# Patient Record
Sex: FEMALE | Race: WHITE | NOT HISPANIC OR LATINO | URBAN - METROPOLITAN AREA
[De-identification: names, ages, dates, MRNs, and addresses within clinical notes are randomized per-mention and may not be internally consistent; named-entity substitution may affect disease eponyms.]

---

## 2018-03-10 ENCOUNTER — EMERGENCY (EMERGENCY)
Facility: HOSPITAL | Age: 53
LOS: 0 days | Discharge: HOME | End: 2018-03-10
Attending: EMERGENCY MEDICINE

## 2018-03-10 VITALS
RESPIRATION RATE: 19 BRPM | DIASTOLIC BLOOD PRESSURE: 93 MMHG | WEIGHT: 110.01 LBS | HEIGHT: 64 IN | OXYGEN SATURATION: 96 % | HEART RATE: 70 BPM | TEMPERATURE: 99 F | SYSTOLIC BLOOD PRESSURE: 144 MMHG
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
## 2018-03-10 DIAGNOSIS — S05.02XA INJURY OF CONJUNCTIVA AND CORNEAL ABRASION WITHOUT FOREIGN BODY, LEFT EYE, INITIAL ENCOUNTER: ICD-10-CM

## 2018-03-10 DIAGNOSIS — F17.200 NICOTINE DEPENDENCE, UNSPECIFIED, UNCOMPLICATED: ICD-10-CM

## 2018-03-10 DIAGNOSIS — Z79.899 OTHER LONG TERM (CURRENT) DRUG THERAPY: ICD-10-CM
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
## 2018-03-10 DIAGNOSIS — H57.12 OCULAR PAIN, LEFT EYE: ICD-10-CM
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
## 2018-03-10 RX ORDER — POLYMYXIN B SULF/TRIMETHOPRIM 10000-1/ML
1 DROPS OPHTHALMIC (EYE) ONCE
Qty: 0 | Refills: 0 | Status: DISCONTINUED | OUTPATIENT
Start: 2018-03-10 | End: 2018-03-10

## 2018-03-10 RX ORDER — GENTAMICIN SULFATE 3 MG/ML
1 SOLUTION/ DROPS OPHTHALMIC ONCE
Qty: 0 | Refills: 0 | Status: COMPLETED | OUTPATIENT
Start: 2018-03-10 | End: 2018-03-10

## 2018-03-10 RX ORDER — TETANUS TOXOID, REDUCED DIPHTHERIA TOXOID AND ACELLULAR PERTUSSIS VACCINE, ADSORBED 5; 2.5; 8; 8; 2.5 [IU]/.5ML; [IU]/.5ML; UG/.5ML; UG/.5ML; UG/.5ML
0.5 SUSPENSION INTRAMUSCULAR ONCE
Qty: 0 | Refills: 0 | Status: COMPLETED | OUTPATIENT
Start: 2018-03-10 | End: 2018-03-10

## 2018-03-10 RX ADMIN — TETANUS TOXOID, REDUCED DIPHTHERIA TOXOID AND ACELLULAR PERTUSSIS VACCINE, ADSORBED 0.5 MILLILITER(S): 5; 2.5; 8; 8; 2.5 SUSPENSION INTRAMUSCULAR at 13:51

## 2018-03-10 RX ADMIN — GENTAMICIN SULFATE 1 DROP(S): 3 SOLUTION/ DROPS OPHTHALMIC at 13:55

## 2018-03-10 NOTE — ED PROVIDER NOTE - MEDICAL DECISION MAKING DETAILS
Chart finished.    51 yo woman with corneal abrasion.  Topical antibiotics and outpaitent follow up.

## 2018-03-10 NOTE — ED PROVIDER NOTE - OBJECTIVE STATEMENT
53 yo F with no significant PMHx presents to the ED c/o left eye redness and tearing. Pt states symptoms started this morning and have been persisting. Pt feels like she has a FB. She is unsure of tetanus status. She denies headache, dizziness, changes in vision, nausea, vomiting.

## 2018-04-10 ENCOUNTER — OUTPATIENT (OUTPATIENT)
Dept: OUTPATIENT SERVICES | Facility: HOSPITAL | Age: 53
LOS: 1 days | Discharge: HOME | End: 2018-04-10

## 2018-04-10 DIAGNOSIS — E11.9 TYPE 2 DIABETES MELLITUS WITHOUT COMPLICATIONS: ICD-10-CM

## 2018-04-10 DIAGNOSIS — E61.1 IRON DEFICIENCY: ICD-10-CM

## 2018-04-10 DIAGNOSIS — N39.0 URINARY TRACT INFECTION, SITE NOT SPECIFIED: ICD-10-CM

## 2018-04-10 DIAGNOSIS — D64.9 ANEMIA, UNSPECIFIED: ICD-10-CM

## 2018-04-10 DIAGNOSIS — D50.0 IRON DEFICIENCY ANEMIA SECONDARY TO BLOOD LOSS (CHRONIC): ICD-10-CM

## 2018-04-10 DIAGNOSIS — E78.00 PURE HYPERCHOLESTEROLEMIA, UNSPECIFIED: ICD-10-CM

## 2018-04-10 DIAGNOSIS — N18.2 CHRONIC KIDNEY DISEASE, STAGE 2 (MILD): ICD-10-CM

## 2018-04-10 DIAGNOSIS — E03.1 CONGENITAL HYPOTHYROIDISM WITHOUT GOITER: ICD-10-CM

## 2019-07-22 NOTE — ED ADULT TRIAGE NOTE - MODE OF ARRIVAL
Problem: Adult Inpatient Plan of Care  Goal: Plan of Care Review  Pt on RA. Pt with no apparent respiratory distress noted. Will continue to monitor.         Walk in

## 2021-07-16 ENCOUNTER — OUTPATIENT (OUTPATIENT)
Dept: OUTPATIENT SERVICES | Facility: HOSPITAL | Age: 56
LOS: 1 days | Discharge: HOME | End: 2021-07-16
Payer: MEDICARE

## 2021-07-16 VITALS
HEART RATE: 67 BPM | HEIGHT: 64 IN | TEMPERATURE: 98 F | RESPIRATION RATE: 15 BRPM | SYSTOLIC BLOOD PRESSURE: 119 MMHG | DIASTOLIC BLOOD PRESSURE: 73 MMHG | OXYGEN SATURATION: 97 % | WEIGHT: 103.84 LBS

## 2021-07-16 DIAGNOSIS — Z90.89 ACQUIRED ABSENCE OF OTHER ORGANS: Chronic | ICD-10-CM

## 2021-07-16 DIAGNOSIS — Z01.818 ENCOUNTER FOR OTHER PREPROCEDURAL EXAMINATION: ICD-10-CM

## 2021-07-16 DIAGNOSIS — Z98.890 OTHER SPECIFIED POSTPROCEDURAL STATES: Chronic | ICD-10-CM

## 2021-07-16 DIAGNOSIS — Z90.49 ACQUIRED ABSENCE OF OTHER SPECIFIED PARTS OF DIGESTIVE TRACT: Chronic | ICD-10-CM

## 2021-07-16 DIAGNOSIS — R91.1 SOLITARY PULMONARY NODULE: ICD-10-CM

## 2021-07-16 PROBLEM — M54.5 LOW BACK PAIN: Chronic | Status: ACTIVE | Noted: 2018-03-10

## 2021-07-16 LAB
ALBUMIN SERPL ELPH-MCNC: 4.1 G/DL — SIGNIFICANT CHANGE UP (ref 3.5–5.2)
ALP SERPL-CCNC: 109 U/L — SIGNIFICANT CHANGE UP (ref 30–115)
ALT FLD-CCNC: 13 U/L — SIGNIFICANT CHANGE UP (ref 0–41)
ANION GAP SERPL CALC-SCNC: 10 MMOL/L — SIGNIFICANT CHANGE UP (ref 7–14)
APTT BLD: 34.8 SEC — SIGNIFICANT CHANGE UP (ref 27–39.2)
AST SERPL-CCNC: 17 U/L — SIGNIFICANT CHANGE UP (ref 0–41)
BASOPHILS # BLD AUTO: 0.03 K/UL — SIGNIFICANT CHANGE UP (ref 0–0.2)
BASOPHILS NFR BLD AUTO: 0.4 % — SIGNIFICANT CHANGE UP (ref 0–1)
BILIRUB SERPL-MCNC: 0.4 MG/DL — SIGNIFICANT CHANGE UP (ref 0.2–1.2)
BUN SERPL-MCNC: 13 MG/DL — SIGNIFICANT CHANGE UP (ref 10–20)
CALCIUM SERPL-MCNC: 9.8 MG/DL — SIGNIFICANT CHANGE UP (ref 8.5–10.1)
CHLORIDE SERPL-SCNC: 100 MMOL/L — SIGNIFICANT CHANGE UP (ref 98–110)
CO2 SERPL-SCNC: 29 MMOL/L — SIGNIFICANT CHANGE UP (ref 17–32)
CREAT SERPL-MCNC: 0.5 MG/DL — LOW (ref 0.7–1.5)
EOSINOPHIL # BLD AUTO: 0.06 K/UL — SIGNIFICANT CHANGE UP (ref 0–0.7)
EOSINOPHIL NFR BLD AUTO: 0.7 % — SIGNIFICANT CHANGE UP (ref 0–8)
GLUCOSE SERPL-MCNC: 94 MG/DL — SIGNIFICANT CHANGE UP (ref 70–99)
HCT VFR BLD CALC: 37.7 % — SIGNIFICANT CHANGE UP (ref 37–47)
HGB BLD-MCNC: 12.5 G/DL — SIGNIFICANT CHANGE UP (ref 12–16)
IMM GRANULOCYTES NFR BLD AUTO: 0.2 % — SIGNIFICANT CHANGE UP (ref 0.1–0.3)
INR BLD: 1.02 RATIO — SIGNIFICANT CHANGE UP (ref 0.65–1.3)
LYMPHOCYTES # BLD AUTO: 1.78 K/UL — SIGNIFICANT CHANGE UP (ref 1.2–3.4)
LYMPHOCYTES # BLD AUTO: 21.1 % — SIGNIFICANT CHANGE UP (ref 20.5–51.1)
MCHC RBC-ENTMCNC: 28.9 PG — SIGNIFICANT CHANGE UP (ref 27–31)
MCHC RBC-ENTMCNC: 33.2 G/DL — SIGNIFICANT CHANGE UP (ref 32–37)
MCV RBC AUTO: 87.1 FL — SIGNIFICANT CHANGE UP (ref 81–99)
MONOCYTES # BLD AUTO: 0.52 K/UL — SIGNIFICANT CHANGE UP (ref 0.1–0.6)
MONOCYTES NFR BLD AUTO: 6.2 % — SIGNIFICANT CHANGE UP (ref 1.7–9.3)
NEUTROPHILS # BLD AUTO: 6.04 K/UL — SIGNIFICANT CHANGE UP (ref 1.4–6.5)
NEUTROPHILS NFR BLD AUTO: 71.4 % — SIGNIFICANT CHANGE UP (ref 42.2–75.2)
NRBC # BLD: 0 /100 WBCS — SIGNIFICANT CHANGE UP (ref 0–0)
PLATELET # BLD AUTO: 189 K/UL — SIGNIFICANT CHANGE UP (ref 130–400)
POTASSIUM SERPL-MCNC: 4 MMOL/L — SIGNIFICANT CHANGE UP (ref 3.5–5)
POTASSIUM SERPL-SCNC: 4 MMOL/L — SIGNIFICANT CHANGE UP (ref 3.5–5)
PROT SERPL-MCNC: 6.8 G/DL — SIGNIFICANT CHANGE UP (ref 6–8)
PROTHROM AB SERPL-ACNC: 11.7 SEC — SIGNIFICANT CHANGE UP (ref 9.95–12.87)
RBC # BLD: 4.33 M/UL — SIGNIFICANT CHANGE UP (ref 4.2–5.4)
RBC # FLD: 13.4 % — SIGNIFICANT CHANGE UP (ref 11.5–14.5)
SODIUM SERPL-SCNC: 139 MMOL/L — SIGNIFICANT CHANGE UP (ref 135–146)
WBC # BLD: 8.45 K/UL — SIGNIFICANT CHANGE UP (ref 4.8–10.8)
WBC # FLD AUTO: 8.45 K/UL — SIGNIFICANT CHANGE UP (ref 4.8–10.8)

## 2021-07-16 PROCEDURE — 93010 ELECTROCARDIOGRAM REPORT: CPT

## 2021-07-16 NOTE — H&P PST ADULT - NSICDXPASTSURGICALHX_GEN_ALL_CORE_FT
PAST SURGICAL HISTORY:  H/O laparoscopy     History of appendectomy     History of tonsillectomy

## 2021-07-16 NOTE — H&P PST ADULT - REASON FOR ADMISSION
Patient is a 55 year old female presenting to PAST in preparation for lung biopsy _ on 7/26/21  under local anesthesia by Dr. PATTON   PT REPORTS--THEY FOUND A MASS IN MY LUNG. I WENT FOR AN MRI OF MY LEFT SHOULDER.  AT THAT TIME A MASS WAS FOUND IN MY LEFT LUNG.   I HAVE PAIN UNDER MY  LEFT BREAST.  THE IS A 7 OUT OF 8.  THE PAIN RADIATES IN MY LEFT UNDERARM AND SOMETIMES TO THE BACK. THE PAIN STARTS OUT MILD AND THEN IT INCREASES THROUGHOUT THE DAY. Patient is a 55 year old female presenting to PAST in preparation for lung biopsy _ on 7/26/21  under local anesthesia by Dr. PATTON   PT REPORTS--THEY FOUND A MASS IN MY LUNG. I WENT FOR AN MRI OF MY LEFT SHOULDER.  AT THAT TIME A MASS WAS FOUND IN MY LEFT LUNG.   I HAVE PAIN UNDER MY  LEFT BREAST.  THE PAIN  IS A 7 OUT OF 8.  THE PAIN RADIATES IN MY LEFT UNDERARM AND SOMETIMES TO THE BACK. THE PAIN STARTS OUT MILD AND THEN IT INCREASES THROUGHOUT THE DAY.

## 2021-07-16 NOTE — H&P PST ADULT - HISTORY OF PRESENT ILLNESS
Universal Safety Interventions
PT PRESENTS TO PAST WITH NO SOB, CP, PALPITATIONS, DYSURIA, UTI OR URI AT PRESENT.   PT ABLE TO WALK UP 2-3 FLIGHTS OF STEPS WITH NO SOB.  AS PER THE PT, THIS IS HIS/HER COMPLETE MEDICAL AND SURGICAL HX, INCLUDING MEDICATIONS PRESCRIBED AND OVER THE COUNTER  denies travel outside the [place] in the past 30 days  pt denies any covid s/s, or tested positive in the past  pt advised self quarantine till day of procedure  Anesthesia Alert  NO--Difficult Airway  NO--History of neck surgery or radiation  NO--Limited ROM of neck  NO--History of Malignant hyperthermia  NO--Personal or family history of Pseudocholinesterase deficiency  NO--Prior Anesthesia Complication  NO--Latex Allergy  NO--Loose teeth  NO--History of Rheumatoid Arthritis  NO--AYE  NO BLEEDING RISK  NO--Other_____

## 2021-07-19 PROBLEM — D64.9 ANEMIA, UNSPECIFIED: Chronic | Status: ACTIVE | Noted: 2021-07-16

## 2021-07-23 ENCOUNTER — OUTPATIENT (OUTPATIENT)
Dept: OUTPATIENT SERVICES | Facility: HOSPITAL | Age: 56
LOS: 1 days | Discharge: HOME | End: 2021-07-23

## 2021-07-23 DIAGNOSIS — Z90.49 ACQUIRED ABSENCE OF OTHER SPECIFIED PARTS OF DIGESTIVE TRACT: Chronic | ICD-10-CM

## 2021-07-23 DIAGNOSIS — Z11.59 ENCOUNTER FOR SCREENING FOR OTHER VIRAL DISEASES: ICD-10-CM

## 2021-07-23 DIAGNOSIS — Z90.89 ACQUIRED ABSENCE OF OTHER ORGANS: Chronic | ICD-10-CM

## 2021-07-23 DIAGNOSIS — Z98.890 OTHER SPECIFIED POSTPROCEDURAL STATES: Chronic | ICD-10-CM

## 2021-07-26 ENCOUNTER — OUTPATIENT (OUTPATIENT)
Dept: OUTPATIENT SERVICES | Facility: HOSPITAL | Age: 56
LOS: 1 days | Discharge: HOME | End: 2021-07-26
Payer: MEDICARE

## 2021-07-26 DIAGNOSIS — Z90.89 ACQUIRED ABSENCE OF OTHER ORGANS: Chronic | ICD-10-CM

## 2021-07-26 DIAGNOSIS — Z90.49 ACQUIRED ABSENCE OF OTHER SPECIFIED PARTS OF DIGESTIVE TRACT: Chronic | ICD-10-CM

## 2021-07-26 DIAGNOSIS — Z98.890 OTHER SPECIFIED POSTPROCEDURAL STATES: Chronic | ICD-10-CM

## 2021-07-26 PROCEDURE — 88334 PATH CONSLTJ SURG CYTO XM EA: CPT | Mod: 26

## 2021-07-26 PROCEDURE — 88333 PATH CONSLTJ SURG CYTO XM 1: CPT | Mod: 26

## 2021-07-26 PROCEDURE — 71045 X-RAY EXAM CHEST 1 VIEW: CPT | Mod: 26

## 2021-07-26 PROCEDURE — 88312 SPECIAL STAINS GROUP 1: CPT | Mod: 26

## 2021-07-26 PROCEDURE — 88305 TISSUE EXAM BY PATHOLOGIST: CPT | Mod: 26

## 2021-07-26 PROCEDURE — 99152 MOD SED SAME PHYS/QHP 5/>YRS: CPT

## 2021-07-26 PROCEDURE — 32408 CORE NDL BX LNG/MED PERQ: CPT

## 2021-07-26 NOTE — PROGRESS NOTE ADULT - SUBJECTIVE AND OBJECTIVE BOX
INTERVENTIONAL RADIOLOGY BRIEF-OPERATIVE NOTE    Procedure: CT guided left lung biopsy    Pre-Op Diagnosis: Left lung mass    Post-Op Diagnosis: same as pre    Attending: Christian Sosa    Anesthesia (type):  [ ] General Anesthesia  [ ] Sedation  [ ] Spinal Anesthesia  [x ] Local/Regional    Contrast: none    Estimated Blood Loss: none    Condition:   [ ] Critical  [ ] Serious  [ ] Fair   [x ] Good    Findings/Follow up Plan of Care: several 20G core biopsy specimens were obtained.     Specimens Removed: 20G cores    Implants: none    Complications: no immediate    Disposition: recovery x 2 hours, home if chest xray is negative for pneumothorax.       Please call Interventional Radiology [phone] with any questions, concerns, or issues.        
                                      Interventional Radiology Outpatient Documentation    PREOPERATIVE DAY OF PROCEDURE EVALUATION:     I have personally seen and examined this patient. I agree with the history and physical which I have reviewed.   Meds:  Home Medications:  Chantix 1 mg oral tablet: 1 tab(s) orally 2 times a day (16 Jul 2021 11:00)  morphine 15 mg oral tablet: 1 tab(s) orally every 4 hours, As Needed (16 Jul 2021 11:00)  Opana 10 mg oral tablet: 20 milligram(s) orally 2 times a day (16 Jul 2021 10:48)  oxyMORphone 10 mg oral tablet: 1 tab(s) orally every 4 hours, As Needed (16 Jul 2021 11:00)     All: No Known Allergies    Labs:   Plan is for left lung mass biopsy with CT guidance.    Procedure/ risks/ benefits/ goals/ alternatives were explained. All questions answered. Informed content obtained from patient. Consent placed in chart.

## 2021-07-30 DIAGNOSIS — R91.8 OTHER NONSPECIFIC ABNORMAL FINDING OF LUNG FIELD: ICD-10-CM

## 2021-07-30 LAB — NON-GYNECOLOGICAL CYTOLOGY STUDY: SIGNIFICANT CHANGE UP

## 2022-07-13 NOTE — H&P PST ADULT - NSANTHOSAYNRD_GEN_A_CORE
none
No. AYE screening performed.  STOP BANG Legend: 0-2 = LOW Risk; 3-4 = INTERMEDIATE Risk; 5-8 = HIGH Risk

## 2023-09-01 ENCOUNTER — INPATIENT (INPATIENT)
Facility: HOSPITAL | Age: 58
LOS: 2 days | Discharge: HOME | DRG: 189 | End: 2023-09-04
Attending: STUDENT IN AN ORGANIZED HEALTH CARE EDUCATION/TRAINING PROGRAM | Admitting: INTERNAL MEDICINE
Payer: MEDICARE

## 2023-09-01 VITALS
TEMPERATURE: 97 F | SYSTOLIC BLOOD PRESSURE: 165 MMHG | DIASTOLIC BLOOD PRESSURE: 72 MMHG | WEIGHT: 89.95 LBS | OXYGEN SATURATION: 100 % | RESPIRATION RATE: 20 BRPM | HEART RATE: 88 BPM

## 2023-09-01 DIAGNOSIS — Z90.49 ACQUIRED ABSENCE OF OTHER SPECIFIED PARTS OF DIGESTIVE TRACT: Chronic | ICD-10-CM

## 2023-09-01 DIAGNOSIS — Z90.89 ACQUIRED ABSENCE OF OTHER ORGANS: Chronic | ICD-10-CM

## 2023-09-01 DIAGNOSIS — Z98.890 OTHER SPECIFIED POSTPROCEDURAL STATES: Chronic | ICD-10-CM

## 2023-09-01 DIAGNOSIS — J96.00 ACUTE RESPIRATORY FAILURE, UNSPECIFIED WHETHER WITH HYPOXIA OR HYPERCAPNIA: ICD-10-CM

## 2023-09-01 LAB
ALBUMIN SERPL ELPH-MCNC: 3.8 G/DL — SIGNIFICANT CHANGE UP (ref 3.5–5.2)
ALP SERPL-CCNC: 172 U/L — HIGH (ref 30–115)
ALT FLD-CCNC: 25 U/L — SIGNIFICANT CHANGE UP (ref 0–41)
ANION GAP SERPL CALC-SCNC: 10 MMOL/L — SIGNIFICANT CHANGE UP (ref 7–14)
APTT BLD: 32.6 SEC — SIGNIFICANT CHANGE UP (ref 27–39.2)
AST SERPL-CCNC: 24 U/L — SIGNIFICANT CHANGE UP (ref 0–41)
BASE EXCESS BLDV CALC-SCNC: 10 MMOL/L — HIGH (ref -2–3)
BASE EXCESS BLDV CALC-SCNC: 9.3 MMOL/L — HIGH (ref -2–3)
BASOPHILS # BLD AUTO: 0 K/UL — SIGNIFICANT CHANGE UP (ref 0–0.2)
BASOPHILS NFR BLD AUTO: 0 % — SIGNIFICANT CHANGE UP (ref 0–1)
BILIRUB SERPL-MCNC: 0.5 MG/DL — SIGNIFICANT CHANGE UP (ref 0.2–1.2)
BUN SERPL-MCNC: 32 MG/DL — HIGH (ref 10–20)
CA-I SERPL-SCNC: 1.15 MMOL/L — SIGNIFICANT CHANGE UP (ref 1.15–1.33)
CA-I SERPL-SCNC: 1.21 MMOL/L — SIGNIFICANT CHANGE UP (ref 1.15–1.33)
CALCIUM SERPL-MCNC: 9.2 MG/DL — SIGNIFICANT CHANGE UP (ref 8.4–10.5)
CHLORIDE SERPL-SCNC: 92 MMOL/L — LOW (ref 98–110)
CO2 SERPL-SCNC: 34 MMOL/L — HIGH (ref 17–32)
CREAT SERPL-MCNC: 0.6 MG/DL — LOW (ref 0.7–1.5)
EGFR: 103 ML/MIN/1.73M2 — SIGNIFICANT CHANGE UP
EOSINOPHIL NFR BLD AUTO: 0 % — SIGNIFICANT CHANGE UP (ref 0–8)
FLUAV AG NPH QL: SIGNIFICANT CHANGE UP
FLUBV AG NPH QL: SIGNIFICANT CHANGE UP
GAS PNL BLDA: SIGNIFICANT CHANGE UP
GAS PNL BLDV: 125 MMOL/L — LOW (ref 136–145)
GAS PNL BLDV: 132 MMOL/L — LOW (ref 136–145)
GAS PNL BLDV: SIGNIFICANT CHANGE UP
GAS PNL BLDV: SIGNIFICANT CHANGE UP
GLUCOSE SERPL-MCNC: 179 MG/DL — HIGH (ref 70–99)
HCO3 BLDV-SCNC: 41 MMOL/L — HIGH (ref 22–29)
HCO3 BLDV-SCNC: 43 MMOL/L — HIGH (ref 22–29)
HCT VFR BLD CALC: 39.5 % — SIGNIFICANT CHANGE UP (ref 37–47)
HCT VFR BLDA CALC: 38 % — LOW (ref 39–51)
HCT VFR BLDA CALC: 41 % — SIGNIFICANT CHANGE UP (ref 39–51)
HGB BLD CALC-MCNC: 12.8 G/DL — SIGNIFICANT CHANGE UP (ref 12.6–17.4)
HGB BLD CALC-MCNC: 13.5 G/DL — SIGNIFICANT CHANGE UP (ref 12.6–17.4)
HGB BLD-MCNC: 12.5 G/DL — SIGNIFICANT CHANGE UP (ref 12–16)
INR BLD: 1.25 RATIO — SIGNIFICANT CHANGE UP (ref 0.65–1.3)
LACTATE BLDV-MCNC: 1.5 MMOL/L — SIGNIFICANT CHANGE UP (ref 0.5–2)
LACTATE BLDV-MCNC: 2.5 MMOL/L — HIGH (ref 0.5–2)
LACTATE SERPL-SCNC: 1.1 MMOL/L — SIGNIFICANT CHANGE UP (ref 0.7–2)
LACTATE SERPL-SCNC: 1.3 MMOL/L — SIGNIFICANT CHANGE UP (ref 0.7–2)
LG PLATELETS BLD QL AUTO: SLIGHT — SIGNIFICANT CHANGE UP
LIDOCAIN IGE QN: 8 U/L — SIGNIFICANT CHANGE UP (ref 7–60)
LYMPHOCYTES # BLD AUTO: 1.27 K/UL — SIGNIFICANT CHANGE UP (ref 1.2–3.4)
LYMPHOCYTES # BLD AUTO: 5 % — LOW (ref 20.5–51.1)
MACROCYTES BLD QL: SLIGHT — SIGNIFICANT CHANGE UP
MAGNESIUM SERPL-MCNC: 2.1 MG/DL — SIGNIFICANT CHANGE UP (ref 1.8–2.4)
MANUAL SMEAR VERIFICATION: SIGNIFICANT CHANGE UP
MCHC RBC-ENTMCNC: 31 PG — SIGNIFICANT CHANGE UP (ref 27–31)
MCHC RBC-ENTMCNC: 31.6 G/DL — LOW (ref 32–37)
MCV RBC AUTO: 98 FL — SIGNIFICANT CHANGE UP (ref 81–99)
METAMYELOCYTES # FLD: 1 % — HIGH (ref 0–0)
MONOCYTES # BLD AUTO: 2.53 K/UL — HIGH (ref 0.1–0.6)
MONOCYTES NFR BLD AUTO: 10 % — HIGH (ref 1.7–9.3)
NEUTROPHILS # BLD AUTO: 21.29 K/UL — HIGH (ref 1.4–6.5)
NEUTROPHILS NFR BLD AUTO: 75 % — SIGNIFICANT CHANGE UP (ref 42.2–75.2)
NEUTS BAND # BLD: 9 % — HIGH (ref 0–6)
NEUTS HYPERSEG # BLD: PRESENT — SIGNIFICANT CHANGE UP
NEUTS VAC BLD QL SMEAR: SLIGHT — SIGNIFICANT CHANGE UP
NRBC # BLD: 0 /100 — SIGNIFICANT CHANGE UP (ref 0–0)
NRBC # BLD: SIGNIFICANT CHANGE UP /100 WBCS (ref 0–0)
NT-PROBNP SERPL-SCNC: 3087 PG/ML — HIGH (ref 0–300)
PCO2 BLDV: 112 MMHG — HIGH (ref 39–42)
PCO2 BLDV: 97 MMHG — HIGH (ref 39–42)
PH BLDV: 7.19 — LOW (ref 7.32–7.43)
PH BLDV: 7.23 — LOW (ref 7.32–7.43)
PLAT MORPH BLD: ABNORMAL
PLATELET # BLD AUTO: 213 K/UL — SIGNIFICANT CHANGE UP (ref 130–400)
PLATELET CLUMP BLD QL SMEAR: ABNORMAL
PMV BLD: 10.1 FL — SIGNIFICANT CHANGE UP (ref 7.4–10.4)
PO2 BLDV: 38 MMHG — SIGNIFICANT CHANGE UP
PO2 BLDV: 41 MMHG — SIGNIFICANT CHANGE UP
POLYCHROMASIA BLD QL SMEAR: SLIGHT — SIGNIFICANT CHANGE UP
POTASSIUM BLDV-SCNC: 4.3 MMOL/L — SIGNIFICANT CHANGE UP (ref 3.5–5.1)
POTASSIUM BLDV-SCNC: 4.3 MMOL/L — SIGNIFICANT CHANGE UP (ref 3.5–5.1)
POTASSIUM SERPL-MCNC: 4.3 MMOL/L — SIGNIFICANT CHANGE UP (ref 3.5–5)
POTASSIUM SERPL-SCNC: 4.3 MMOL/L — SIGNIFICANT CHANGE UP (ref 3.5–5)
PROT SERPL-MCNC: 7.1 G/DL — SIGNIFICANT CHANGE UP (ref 6–8)
PROTHROM AB SERPL-ACNC: 14.3 SEC — HIGH (ref 9.95–12.87)
RBC # BLD: 4.03 M/UL — LOW (ref 4.2–5.4)
RBC # FLD: 15 % — HIGH (ref 11.5–14.5)
RBC BLD AUTO: ABNORMAL
RSV RNA NPH QL NAA+NON-PROBE: SIGNIFICANT CHANGE UP
SAO2 % BLDV: 55 % — SIGNIFICANT CHANGE UP
SAO2 % BLDV: 56.8 % — SIGNIFICANT CHANGE UP
SARS-COV-2 RNA SPEC QL NAA+PROBE: SIGNIFICANT CHANGE UP
SODIUM SERPL-SCNC: 136 MMOL/L — SIGNIFICANT CHANGE UP (ref 135–146)
TOXIC GRANULES BLD QL SMEAR: PRESENT — SIGNIFICANT CHANGE UP
TROPONIN T SERPL-MCNC: <0.01 NG/ML — SIGNIFICANT CHANGE UP
WBC # BLD: 25.34 K/UL — HIGH (ref 4.8–10.8)
WBC # FLD AUTO: 25.34 K/UL — HIGH (ref 4.8–10.8)

## 2023-09-01 PROCEDURE — 84100 ASSAY OF PHOSPHORUS: CPT

## 2023-09-01 PROCEDURE — 87040 BLOOD CULTURE FOR BACTERIA: CPT

## 2023-09-01 PROCEDURE — 0225U NFCT DS DNA&RNA 21 SARSCOV2: CPT

## 2023-09-01 PROCEDURE — 70450 CT HEAD/BRAIN W/O DYE: CPT | Mod: 26,MA

## 2023-09-01 PROCEDURE — 84132 ASSAY OF SERUM POTASSIUM: CPT

## 2023-09-01 PROCEDURE — 86803 HEPATITIS C AB TEST: CPT

## 2023-09-01 PROCEDURE — 87899 AGENT NOS ASSAY W/OPTIC: CPT

## 2023-09-01 PROCEDURE — 87640 STAPH A DNA AMP PROBE: CPT

## 2023-09-01 PROCEDURE — 36415 COLL VENOUS BLD VENIPUNCTURE: CPT

## 2023-09-01 PROCEDURE — 82803 BLOOD GASES ANY COMBINATION: CPT

## 2023-09-01 PROCEDURE — 81001 URINALYSIS AUTO W/SCOPE: CPT

## 2023-09-01 PROCEDURE — 83735 ASSAY OF MAGNESIUM: CPT

## 2023-09-01 PROCEDURE — 83605 ASSAY OF LACTIC ACID: CPT

## 2023-09-01 PROCEDURE — 93306 TTE W/DOPPLER COMPLETE: CPT

## 2023-09-01 PROCEDURE — 84145 PROCALCITONIN (PCT): CPT

## 2023-09-01 PROCEDURE — C9113: CPT

## 2023-09-01 PROCEDURE — 99285 EMERGENCY DEPT VISIT HI MDM: CPT

## 2023-09-01 PROCEDURE — 71045 X-RAY EXAM CHEST 1 VIEW: CPT

## 2023-09-01 PROCEDURE — 87449 NOS EACH ORGANISM AG IA: CPT

## 2023-09-01 PROCEDURE — 80053 COMPREHEN METABOLIC PANEL: CPT

## 2023-09-01 PROCEDURE — 82330 ASSAY OF CALCIUM: CPT

## 2023-09-01 PROCEDURE — 93005 ELECTROCARDIOGRAM TRACING: CPT

## 2023-09-01 PROCEDURE — 85018 HEMOGLOBIN: CPT

## 2023-09-01 PROCEDURE — 85014 HEMATOCRIT: CPT

## 2023-09-01 PROCEDURE — 94660 CPAP INITIATION&MGMT: CPT

## 2023-09-01 PROCEDURE — 71045 X-RAY EXAM CHEST 1 VIEW: CPT | Mod: 26

## 2023-09-01 PROCEDURE — 85025 COMPLETE CBC W/AUTO DIFF WBC: CPT

## 2023-09-01 PROCEDURE — 84295 ASSAY OF SERUM SODIUM: CPT

## 2023-09-01 PROCEDURE — 94640 AIRWAY INHALATION TREATMENT: CPT

## 2023-09-01 PROCEDURE — 94760 N-INVAS EAR/PLS OXIMETRY 1: CPT

## 2023-09-01 PROCEDURE — 87641 MR-STAPH DNA AMP PROBE: CPT

## 2023-09-01 PROCEDURE — 87070 CULTURE OTHR SPECIMN AEROBIC: CPT

## 2023-09-01 PROCEDURE — 99223 1ST HOSP IP/OBS HIGH 75: CPT

## 2023-09-01 PROCEDURE — 71275 CT ANGIOGRAPHY CHEST: CPT | Mod: 26,MA

## 2023-09-01 RX ORDER — AZITHROMYCIN 500 MG/1
500 TABLET, FILM COATED ORAL EVERY 24 HOURS
Refills: 0 | Status: DISCONTINUED | OUTPATIENT
Start: 2023-09-02 | End: 2023-09-02

## 2023-09-01 RX ORDER — ALBUTEROL 90 UG/1
2 AEROSOL, METERED ORAL EVERY 4 HOURS
Refills: 0 | Status: DISCONTINUED | OUTPATIENT
Start: 2023-09-01 | End: 2023-09-02

## 2023-09-01 RX ORDER — MORPHINE SULFATE 50 MG/1
15 CAPSULE, EXTENDED RELEASE ORAL DAILY
Refills: 0 | Status: DISCONTINUED | OUTPATIENT
Start: 2023-09-01 | End: 2023-09-04

## 2023-09-01 RX ORDER — IPRATROPIUM/ALBUTEROL SULFATE 18-103MCG
3 AEROSOL WITH ADAPTER (GRAM) INHALATION ONCE
Refills: 0 | Status: COMPLETED | OUTPATIENT
Start: 2023-09-01 | End: 2023-09-01

## 2023-09-01 RX ORDER — OXYCODONE HYDROCHLORIDE 5 MG/1
10 TABLET ORAL
Refills: 0 | Status: DISCONTINUED | OUTPATIENT
Start: 2023-09-01 | End: 2023-09-04

## 2023-09-01 RX ORDER — SODIUM CHLORIDE 9 MG/ML
1000 INJECTION, SOLUTION INTRAVENOUS
Refills: 0 | Status: DISCONTINUED | OUTPATIENT
Start: 2023-09-01 | End: 2023-09-02

## 2023-09-01 RX ORDER — OXYMORPHONE HYDROCHLORIDE 10 MG/1
20 TABLET, EXTENDED RELEASE ORAL
Qty: 0 | Refills: 0 | DISCHARGE

## 2023-09-01 RX ORDER — AZITHROMYCIN 500 MG/1
TABLET, FILM COATED ORAL
Refills: 0 | Status: DISCONTINUED | OUTPATIENT
Start: 2023-09-01 | End: 2023-09-02

## 2023-09-01 RX ORDER — GUAIFENESIN/DEXTROMETHORPHAN 600MG-30MG
10 TABLET, EXTENDED RELEASE 12 HR ORAL EVERY 4 HOURS
Refills: 0 | Status: DISCONTINUED | OUTPATIENT
Start: 2023-09-01 | End: 2023-09-04

## 2023-09-01 RX ORDER — CHLORHEXIDINE GLUCONATE 213 G/1000ML
1 SOLUTION TOPICAL
Refills: 0 | Status: DISCONTINUED | OUTPATIENT
Start: 2023-09-01 | End: 2023-09-01

## 2023-09-01 RX ORDER — AZITHROMYCIN 500 MG/1
500 TABLET, FILM COATED ORAL ONCE
Refills: 0 | Status: COMPLETED | OUTPATIENT
Start: 2023-09-01 | End: 2023-09-01

## 2023-09-01 RX ORDER — OXYMORPHONE HYDROCHLORIDE 10 MG/1
1 TABLET, EXTENDED RELEASE ORAL
Qty: 0 | Refills: 0 | DISCHARGE

## 2023-09-01 RX ORDER — IPRATROPIUM/ALBUTEROL SULFATE 18-103MCG
3 AEROSOL WITH ADAPTER (GRAM) INHALATION EVERY 6 HOURS
Refills: 0 | Status: DISCONTINUED | OUTPATIENT
Start: 2023-09-01 | End: 2023-09-02

## 2023-09-01 RX ORDER — ENOXAPARIN SODIUM 100 MG/ML
40 INJECTION SUBCUTANEOUS EVERY 24 HOURS
Refills: 0 | Status: DISCONTINUED | OUTPATIENT
Start: 2023-09-01 | End: 2023-09-02

## 2023-09-01 RX ORDER — HYDROMORPHONE HYDROCHLORIDE 2 MG/ML
0.25 INJECTION INTRAMUSCULAR; INTRAVENOUS; SUBCUTANEOUS
Refills: 0 | Status: DISCONTINUED | OUTPATIENT
Start: 2023-09-01 | End: 2023-09-03

## 2023-09-01 RX ORDER — CEFEPIME 1 G/1
2000 INJECTION, POWDER, FOR SOLUTION INTRAMUSCULAR; INTRAVENOUS ONCE
Refills: 0 | Status: COMPLETED | OUTPATIENT
Start: 2023-09-01 | End: 2023-09-01

## 2023-09-01 RX ORDER — MORPHINE SULFATE 50 MG/1
1 CAPSULE, EXTENDED RELEASE ORAL
Qty: 0 | Refills: 0 | DISCHARGE

## 2023-09-01 RX ORDER — CHLORHEXIDINE GLUCONATE 213 G/1000ML
1 SOLUTION TOPICAL DAILY
Refills: 0 | Status: DISCONTINUED | OUTPATIENT
Start: 2023-09-01 | End: 2023-09-04

## 2023-09-01 RX ORDER — CEFEPIME 1 G/1
INJECTION, POWDER, FOR SOLUTION INTRAMUSCULAR; INTRAVENOUS
Refills: 0 | Status: DISCONTINUED | OUTPATIENT
Start: 2023-09-01 | End: 2023-09-04

## 2023-09-01 RX ORDER — CEFEPIME 1 G/1
2000 INJECTION, POWDER, FOR SOLUTION INTRAMUSCULAR; INTRAVENOUS EVERY 12 HOURS
Refills: 0 | Status: DISCONTINUED | OUTPATIENT
Start: 2023-09-02 | End: 2023-09-04

## 2023-09-01 RX ADMIN — HYDROMORPHONE HYDROCHLORIDE 0.25 MILLIGRAM(S): 2 INJECTION INTRAMUSCULAR; INTRAVENOUS; SUBCUTANEOUS at 17:08

## 2023-09-01 RX ADMIN — CEFEPIME 100 MILLIGRAM(S): 1 INJECTION, POWDER, FOR SOLUTION INTRAMUSCULAR; INTRAVENOUS at 17:30

## 2023-09-01 RX ADMIN — Medication 3 MILLILITER(S): at 13:05

## 2023-09-01 RX ADMIN — OXYCODONE HYDROCHLORIDE 10 MILLIGRAM(S): 5 TABLET ORAL at 22:46

## 2023-09-01 RX ADMIN — AZITHROMYCIN 255 MILLIGRAM(S): 500 TABLET, FILM COATED ORAL at 18:15

## 2023-09-01 RX ADMIN — Medication 40 MILLIGRAM(S): at 21:07

## 2023-09-01 RX ADMIN — HYDROMORPHONE HYDROCHLORIDE 0.25 MILLIGRAM(S): 2 INJECTION INTRAMUSCULAR; INTRAVENOUS; SUBCUTANEOUS at 17:30

## 2023-09-01 RX ADMIN — OXYCODONE HYDROCHLORIDE 10 MILLIGRAM(S): 5 TABLET ORAL at 22:16

## 2023-09-01 RX ADMIN — Medication 40 MILLIGRAM(S): at 17:08

## 2023-09-01 RX ADMIN — SODIUM CHLORIDE 75 MILLILITER(S): 9 INJECTION, SOLUTION INTRAVENOUS at 17:00

## 2023-09-01 RX ADMIN — ENOXAPARIN SODIUM 40 MILLIGRAM(S): 100 INJECTION SUBCUTANEOUS at 17:10

## 2023-09-01 NOTE — ED PROVIDER NOTE - OBJECTIVE STATEMENT
59yo former smoking woman pmhx known lung CA w/ brain mets in active immuno therapy peme/pembro following at [facility] last treatment 8/23, migraines presents w/ family from home w/ acute SOB    Pt was in her usual state of health until approximately two days prior when pt started complaining of productive cough, increased secretions; Per family last chest imaging at [place] showed inflammation vs infection"; This AM daughter called for well check and she was disoriented and complaining of difficulty breathing. Alerted EMS who reported hypoxia on seen and transported to ED

## 2023-09-01 NOTE — ED PROVIDER NOTE - PHYSICAL EXAMINATION
CONSTITUTIONAL:  in moderate acute distress.   SKIN: warm, dry  HEAD: Normocephalic; atraumatic.  EYES: PERRL, EOMI, normal sclera and conjunctiva   ENT: No nasal discharge; airway clear.  NECK: Supple; non tender.  CARD:  Regular rate and rhythm.   RESP: +inc WOB decreased breath sounds at bases  ABD: soft ntnd  EXT: Normal ROM.    LYMPH: No acute cervical adenopathy.  NEURO: Alert, oriented, grossly unremarkable  PSYCH: Cooperative, appropriate.

## 2023-09-01 NOTE — H&P ADULT - ASSESSMENT
57yo woman pmhx lung CA w/ mets to the brain on immunotherapy being treated for acute hypercapnic respiratory failure    # acute hypercapnic respiratory failure  # COPD exacerbation  # Lung CA  # r/o ?CAPNA vs ?post obstructive PNA  - WBC 25 on admission; per labs at bedside was 6.8 8/17  - solu 40 tid  - verona  - chest PT  - bipap prn   - cefepime/azithro x 5 days  - f/u cultures  - duonebs q6 standing w/ albuterol rescue    # Lung CA w/ brain mets - stable  - f/u OP  - pain management  morphine ER 15 qd  oxy IR 10 q3 (on oxy IR 15 q4 at home)    DVT lovenox sq  Activity as tolerated  Code full  Diet regular  GI ppx protonix    Per family med rec is complete, does get zofran prn when getting treatment   57yo woman pmhx lung CA w/ mets to the brain on immunotherapy being treated for acute hypercapnic respiratory failure    # acute hypercapnic respiratory failure  # COPD exacerbation  # Lung CA  # r/o ?CAPNA vs ?post obstructive PNA  - WBC 25 on admission; per labs at bedside was 6.8 8/17  - solu 40 tid  - verona  - chest PT  - bipap prn   - cefepime/azithro x 5 days  - f/u cultures  - duonebs q6 standing w/ albuterol rescue    # Lung CA w/ brain mets - stable  - f/u OP  - pain management  morphine ER 15 qd  oxy IR 10 q3 (on oxy IR 15 q4 at home)    DVT lovenox sq  Activity as tolerated  Code full (HCP would like to avoid intubation if possible but pt remains full code)  Diet regular  GI ppx protonix    Per family med rec is complete, does get zofran prn when getting treatment

## 2023-09-01 NOTE — H&P ADULT - HISTORY OF PRESENT ILLNESS
59yo former smoking woman pmhx known lung CA w/ brain mets in active immuno therapy peme/pembro following at [facility] last treatment 8/23, migraines presents w/ family from home w/ acute SOB    Pt was in her usual state of health until approximately two days prior when pt started complaining of productive cough, increased secretions; Per family last chest imaging at [place] showed inflammation vs infection"; This AM daughter called for well check and she was disoriented and complaining of difficulty breathing. Alerted EMS who reported hypoxia on seen and transported to ED    Of note per family last WBC 8/17 6.8  Last immunotherapy 8/23    HCP Daughter Judit Dawson RN works in [employer] w/ bad cell reception can contact on teams    family endorses productive cough, sob, robles,   family denies fever, n/v/d, sick contacts    ED  /72 HR 88 RR 20 100% NRB T 96.8  WBC 25.34 Hg 12.5 Plt 213 BUN 32 Cr 0.6 BNP 3087 Trop negative   VBG pH7.19 pCO2 112 pO2 41 BiCarb 43    Pt given a duoneb and put on BiPap with improved VBG    VBG 7.23 pCO2 97 pO2 38 BiCarb 41    Pt admitted to ICU for further management

## 2023-09-01 NOTE — ED PROVIDER NOTE - CARE PLAN
1 Principal Discharge DX:	Acute respiratory failure with hypoxia and hypercapnia  Secondary Diagnosis:	CO2 narcosis

## 2023-09-01 NOTE — ED ADULT NURSE NOTE - OBJECTIVE STATEMENT
patient BIBA O2 35% in the field placed on NRB. Patient laboring with audible chest congestion. Patient tried in NC, placed back on NRB.

## 2023-09-01 NOTE — ED PROVIDER NOTE - ATTENDING CONTRIBUTION TO CARE
Patient is a 58-year-old female with history of stage IV lung cancer with brain mets under chemoradiation and immunotherapy at [facility] last chemo August 23 coming in for sudden onset shortness of breath that began today.  She spoke to her daughter in the morning and was well and was planning on going to [place] but when daughter called later she reported having shortness of breath and appeared to be disoriented to the daughter.  Denies any syncope.    Exam: Increased work of breathing originally now improved on 4 L nasal cannula, lungs clear, regular rate, no lower extremity edema, no calf tenderness, no JVD, soft nontender abdomen, cachectic female  Plan: Labs, CT PE, x-ray, EKG, oxygen therapy, blood gas

## 2023-09-01 NOTE — PHARMACOTHERAPY INTERVENTION NOTE - COMMENTS
Recommended to order a Legionella urinary antigen since patient has been empirically started on azithromycin for the treatment of pneumonia.    Ifeanyi Madera, PharmD, BCIDP  Clinical Pharmacy Specialist, Infectious Diseases  Tele-Antimicrobial Stewardship Program (Tele-ASP)  Tele-ASP Phone: [phone]

## 2023-09-01 NOTE — H&P ADULT - NSHPLABSRESULTS_GEN_ALL_CORE
MEDICATIONS:  STANDING MEDICATIONS  chlorhexidine 4% Liquid 1 Application(s) Topical <User Schedule>  enoxaparin Injectable 40 milliGRAM(s) SubCutaneous every 24 hours    PRN MEDICATIONS  HYDROmorphone  Injectable 0.25 milliGRAM(s) IV Push every 2 hours PRN      LABS:                        12.5   25.34 )-----------( 213      ( 01 Sep 2023 11:30 )             39.5     09-01    136  |  92<L>  |  32<H>  ----------------------------<  179<H>  4.3   |  34<H>  |  0.6<L>    Ca    9.2      01 Sep 2023 11:30  Mg     2.1     09-01    TPro  7.1  /  Alb  3.8  /  TBili  0.5  /  DBili  x   /  AST  24  /  ALT  25  /  AlkPhos  172<H>  09-01    PT/INR - ( 01 Sep 2023 11:30 )   PT: 14.30 sec;   INR: 1.25 ratio         PTT - ( 01 Sep 2023 11:30 )  PTT:32.6 sec  Urinalysis Basic - ( 01 Sep 2023 11:30 )    Color: x / Appearance: x / SG: x / pH: x  Gluc: 179 mg/dL / Ketone: x  / Bili: x / Urobili: x   Blood: x / Protein: x / Nitrite: x   Leuk Esterase: x / RBC: x / WBC x   Sq Epi: x / Non Sq Epi: x / Bacteria: x        Troponin T, Serum: <0.01 ng/mL (09-01-23 @ 11:30)      CARDIAC MARKERS ( 01 Sep 2023 11:30 )  x     / <0.01 ng/mL / x     / x     / x          RADIOLOGY:    < from: Xray Chest 1 View- PORTABLE-Urgent (09.01.23 @ 12:01) >      Impression:    Left apical masslike opacity. Please see dedicated report of CAT scan of   the chest from the same day.        --- End of Report ---    < end of copied text >    < from: CT Head No Cont (09.01.23 @ 12:38) >      IMPRESSION:    1.  Motion limited study.    2.  Equivocal edema/lesion within the left cerebellar hemisphere (versus   artifact). Recommend further evaluation with contrast-enhanced CT or MRI   with control of patient motion.    --- End of Report ---      < end of copied text >    < from: CT Angio Chest PE Protocol w/ IV Cont (09.01.23 @ 12:42) >

## 2023-09-01 NOTE — ED PROVIDER NOTE - DIFFERENTIAL DIAGNOSIS
PE, lung ca, PNA Differential Diagnosis abdomen soft, non-tender, and non-distended. Bowel sounds present.

## 2023-09-01 NOTE — H&P ADULT - NSHPSOCIALHISTORY_GEN_ALL_CORE
Strong family support   Smoking Hx  no sick contacts no travel  2 pet dogs, Frisian mcneill and Frisian husky mix

## 2023-09-01 NOTE — H&P ADULT - NSHPPHYSICALEXAM_GEN_ALL_CORE
CONSTITUTIONAL: somnolent, on BiPap,     EYES: PERRLA and symmetric, EOMI, No conjunctival or scleral injection, non-icteric    ENMT: Oral mucosa with moist membranes. No external nasal lesions; normal dentition; no gross hearing impairment noted.    NECK: Supple, symmetric and without tracheal deviation; thyroid gland not enlarged and without palpable masses    RESPIRATORY: no use of accessory muscles; rough breath sounds BL, no wheezes, rales or rhonchi, no dullness or hyperresonance to percussion, no tactile fremitus, no subcutaneous emphysema    CARDIOVASCULAR: RRRR, +S1S2, no murmur appreciated, no rubs, no gallops; no JVD; no peripheral edema    Vascular: no carotid bruits; carotid pulse palpable, radial pulse palpable, posterior tibialis pulse palpable    GASTROINTESTINAL: Soft, tender, non distended, no rebound, no guarding; No palpable masses; no hepatosplenomegaly; no hernia palpated;    MUSCULOSKELETAL: no significant limitation on ROM, moves all extremities in plane of bed    SKIN: No rashes or ulcers noted; no subcutaneous nodules or induration palpable    NEUROLOGIC: moves all extremities in plane of bed, no droop    PSYCHIATRIC: somnolent and sleeping on bipap

## 2023-09-01 NOTE — PATIENT PROFILE ADULT - FALL HARM RISK - HARM RISK INTERVENTIONS

## 2023-09-01 NOTE — PATIENT PROFILE ADULT - DEAF OR HARD OF HEARING?
Recorded as Task  Date: 02/01/2017 03:28 PM, Created By: Azalia Rizvi  Task Name: Follow Up  Assigned To: Jeannette Moody  Regarding Patient: Mae Esquivel, Status: Active  CommentAzalia Rizvi - 01 Feb 2017 3:28 PM    TASK CREATED  [employer] sent fax stating Botox is approved for initial 3 month injection but will need clinical update for renewal.  Please discuss schedule with Dr Anupama Lawrence and let Nikki Wiley, , know when pt can come in for her appt's (will need Botox appt and follow up to document her response so that it can be renewed again before another treatment will be approved again). Thanks. [facility] - 01 Feb 2017 4:36 PM    TASK EDITED  Task Vivian carpio please arrange Botox appointment when Dr. Anupama Lawrence will be available. Vivian Rose - 02 Feb 2017 2:13 PM    TASK REASSIGNED: Previously Assigned To Vivian Rose  When would you like me to arrange Botox? Sravani Schneider - 14 Feb 2017 4:41 AM    TASK REPLIED TO: Previously Assigned To [address]. Flaco Young - 20 Feb 2017 1:36 PM    TASK EDITED  Left message for patient's sponsor at [facility] [facility] to call back. Per Dr. Jerez Matters work in [email or web address].  Vivian Rose - 24 Feb 2017 9:55 AM    TASK EDITED  Spoke with assistant at [facility], they will get patient here for 9am cancellation on 2/27/17  Azalia Rizvi - 24 Feb 2017 11:48 AM    TASK EDITED  Thank you. Electronically signed by:Jeannette Frias.   Feb 24 2017 11:49AM CST no

## 2023-09-01 NOTE — ED ADULT NURSE NOTE - NSFALLRISKINTERV_ED_ALL_ED

## 2023-09-01 NOTE — H&P ADULT - ATTENDING COMMENTS
57yo woman with a medical history of lung CA w/ mets to the brain on immunotherapy being treated for acute hypercapnic respiratory failure    acute respiratory failure with hypercapnia     - NIPPV   - IV steroids   - IV antibiotics   - check procal   - monitor in ICU

## 2023-09-02 LAB
ALBUMIN SERPL ELPH-MCNC: 3.4 G/DL — LOW (ref 3.5–5.2)
ALP SERPL-CCNC: 161 U/L — HIGH (ref 30–115)
ALT FLD-CCNC: 26 U/L — SIGNIFICANT CHANGE UP (ref 0–41)
ANION GAP SERPL CALC-SCNC: 6 MMOL/L — LOW (ref 7–14)
APPEARANCE UR: ABNORMAL
AST SERPL-CCNC: 26 U/L — SIGNIFICANT CHANGE UP (ref 0–41)
BACTERIA # UR AUTO: ABNORMAL /HPF
BASOPHILS # BLD AUTO: 0.09 K/UL — SIGNIFICANT CHANGE UP (ref 0–0.2)
BASOPHILS NFR BLD AUTO: 0.5 % — SIGNIFICANT CHANGE UP (ref 0–1)
BILIRUB SERPL-MCNC: 0.4 MG/DL — SIGNIFICANT CHANGE UP (ref 0.2–1.2)
BILIRUB UR-MCNC: NEGATIVE — SIGNIFICANT CHANGE UP
BUN SERPL-MCNC: 22 MG/DL — HIGH (ref 10–20)
CALCIUM SERPL-MCNC: 8.7 MG/DL — SIGNIFICANT CHANGE UP (ref 8.4–10.5)
CHLORIDE SERPL-SCNC: 93 MMOL/L — LOW (ref 98–110)
CO2 SERPL-SCNC: 38 MMOL/L — HIGH (ref 17–32)
COLOR SPEC: YELLOW — SIGNIFICANT CHANGE UP
CREAT SERPL-MCNC: 0.5 MG/DL — LOW (ref 0.7–1.5)
DIFF PNL FLD: ABNORMAL
EGFR: 108 ML/MIN/1.73M2 — SIGNIFICANT CHANGE UP
EOSINOPHIL # BLD AUTO: 0 K/UL — SIGNIFICANT CHANGE UP (ref 0–0.7)
EOSINOPHIL NFR BLD AUTO: 0 % — SIGNIFICANT CHANGE UP (ref 0–8)
EPI CELLS # UR: PRESENT
GLUCOSE SERPL-MCNC: 152 MG/DL — HIGH (ref 70–99)
GLUCOSE UR QL: NEGATIVE MG/DL — SIGNIFICANT CHANGE UP
HCT VFR BLD CALC: 38.4 % — SIGNIFICANT CHANGE UP (ref 37–47)
HCV AB S/CO SERPL IA: 0.03 COI — SIGNIFICANT CHANGE UP
HCV AB SERPL-IMP: SIGNIFICANT CHANGE UP
HGB BLD-MCNC: 12.3 G/DL — SIGNIFICANT CHANGE UP (ref 12–16)
HYALINE CASTS # UR AUTO: 1 — SIGNIFICANT CHANGE UP
IMM GRANULOCYTES NFR BLD AUTO: 1.2 % — HIGH (ref 0.1–0.3)
KETONES UR-MCNC: ABNORMAL MG/DL
LACTATE SERPL-SCNC: 1.2 MMOL/L — SIGNIFICANT CHANGE UP (ref 0.7–2)
LEUKOCYTE ESTERASE UR-ACNC: NEGATIVE — SIGNIFICANT CHANGE UP
LYMPHOCYTES # BLD AUTO: 0.87 K/UL — LOW (ref 1.2–3.4)
LYMPHOCYTES # BLD AUTO: 4.4 % — LOW (ref 20.5–51.1)
MAGNESIUM SERPL-MCNC: 2.2 MG/DL — SIGNIFICANT CHANGE UP (ref 1.8–2.4)
MCHC RBC-ENTMCNC: 31.4 PG — HIGH (ref 27–31)
MCHC RBC-ENTMCNC: 32 G/DL — SIGNIFICANT CHANGE UP (ref 32–37)
MCV RBC AUTO: 98 FL — SIGNIFICANT CHANGE UP (ref 81–99)
MONOCYTES # BLD AUTO: 0.54 K/UL — SIGNIFICANT CHANGE UP (ref 0.1–0.6)
MONOCYTES NFR BLD AUTO: 2.7 % — SIGNIFICANT CHANGE UP (ref 1.7–9.3)
MRSA PCR RESULT.: NEGATIVE — SIGNIFICANT CHANGE UP
NEUTROPHILS # BLD AUTO: 18.18 K/UL — HIGH (ref 1.4–6.5)
NEUTROPHILS NFR BLD AUTO: 91.2 % — HIGH (ref 42.2–75.2)
NITRITE UR-MCNC: NEGATIVE — SIGNIFICANT CHANGE UP
NRBC # BLD: 0 /100 WBCS — SIGNIFICANT CHANGE UP (ref 0–0)
PH UR: 6 — SIGNIFICANT CHANGE UP (ref 5–8)
PHOSPHATE SERPL-MCNC: 3.1 MG/DL — SIGNIFICANT CHANGE UP (ref 2.1–4.9)
PLATELET # BLD AUTO: 176 K/UL — SIGNIFICANT CHANGE UP (ref 130–400)
PMV BLD: 10.8 FL — HIGH (ref 7.4–10.4)
POTASSIUM SERPL-MCNC: 5 MMOL/L — SIGNIFICANT CHANGE UP (ref 3.5–5)
POTASSIUM SERPL-SCNC: 5 MMOL/L — SIGNIFICANT CHANGE UP (ref 3.5–5)
PROCALCITONIN SERPL-MCNC: 0.23 NG/ML — HIGH (ref 0.02–0.1)
PROT SERPL-MCNC: 6.5 G/DL — SIGNIFICANT CHANGE UP (ref 6–8)
PROT UR-MCNC: 100 MG/DL
RBC # BLD: 3.92 M/UL — LOW (ref 4.2–5.4)
RBC # FLD: 15.1 % — HIGH (ref 11.5–14.5)
RBC CASTS # UR COMP ASSIST: 8 /HPF — HIGH (ref 0–4)
SODIUM SERPL-SCNC: 137 MMOL/L — SIGNIFICANT CHANGE UP (ref 135–146)
SP GR SPEC: >1.03 — HIGH (ref 1–1.03)
UROBILINOGEN FLD QL: 1 MG/DL — SIGNIFICANT CHANGE UP (ref 0.2–1)
WBC # BLD: 19.91 K/UL — HIGH (ref 4.8–10.8)
WBC # FLD AUTO: 19.91 K/UL — HIGH (ref 4.8–10.8)
WBC UR QL: 2 /HPF — SIGNIFICANT CHANGE UP (ref 0–5)

## 2023-09-02 PROCEDURE — 93010 ELECTROCARDIOGRAM REPORT: CPT

## 2023-09-02 PROCEDURE — 99233 SBSQ HOSP IP/OBS HIGH 50: CPT

## 2023-09-02 PROCEDURE — 71045 X-RAY EXAM CHEST 1 VIEW: CPT | Mod: 26

## 2023-09-02 PROCEDURE — 99232 SBSQ HOSP IP/OBS MODERATE 35: CPT

## 2023-09-02 RX ORDER — SODIUM CHLORIDE 9 MG/ML
1000 INJECTION, SOLUTION INTRAVENOUS
Refills: 0 | Status: DISCONTINUED | OUTPATIENT
Start: 2023-09-02 | End: 2023-09-03

## 2023-09-02 RX ORDER — IPRATROPIUM/ALBUTEROL SULFATE 18-103MCG
3 AEROSOL WITH ADAPTER (GRAM) INHALATION EVERY 6 HOURS
Refills: 0 | Status: DISCONTINUED | OUTPATIENT
Start: 2023-09-02 | End: 2023-09-04

## 2023-09-02 RX ORDER — PANTOPRAZOLE SODIUM 20 MG/1
40 TABLET, DELAYED RELEASE ORAL DAILY
Refills: 0 | Status: DISCONTINUED | OUTPATIENT
Start: 2023-09-02 | End: 2023-09-04

## 2023-09-02 RX ORDER — IPRATROPIUM/ALBUTEROL SULFATE 18-103MCG
3 AEROSOL WITH ADAPTER (GRAM) INHALATION EVERY 4 HOURS
Refills: 0 | Status: DISCONTINUED | OUTPATIENT
Start: 2023-09-02 | End: 2023-09-04

## 2023-09-02 RX ORDER — AZITHROMYCIN 500 MG/1
500 TABLET, FILM COATED ORAL DAILY
Refills: 0 | Status: DISCONTINUED | OUTPATIENT
Start: 2023-09-03 | End: 2023-09-04

## 2023-09-02 RX ADMIN — OXYCODONE HYDROCHLORIDE 10 MILLIGRAM(S): 5 TABLET ORAL at 18:58

## 2023-09-02 RX ADMIN — PANTOPRAZOLE SODIUM 40 MILLIGRAM(S): 20 TABLET, DELAYED RELEASE ORAL at 12:33

## 2023-09-02 RX ADMIN — Medication 3 MILLILITER(S): at 19:22

## 2023-09-02 RX ADMIN — OXYCODONE HYDROCHLORIDE 10 MILLIGRAM(S): 5 TABLET ORAL at 04:12

## 2023-09-02 RX ADMIN — OXYCODONE HYDROCHLORIDE 10 MILLIGRAM(S): 5 TABLET ORAL at 08:51

## 2023-09-02 RX ADMIN — OXYCODONE HYDROCHLORIDE 10 MILLIGRAM(S): 5 TABLET ORAL at 18:31

## 2023-09-02 RX ADMIN — OXYCODONE HYDROCHLORIDE 10 MILLIGRAM(S): 5 TABLET ORAL at 04:42

## 2023-09-02 RX ADMIN — CEFEPIME 100 MILLIGRAM(S): 1 INJECTION, POWDER, FOR SOLUTION INTRAMUSCULAR; INTRAVENOUS at 18:20

## 2023-09-02 RX ADMIN — OXYCODONE HYDROCHLORIDE 10 MILLIGRAM(S): 5 TABLET ORAL at 13:36

## 2023-09-02 RX ADMIN — AZITHROMYCIN 255 MILLIGRAM(S): 500 TABLET, FILM COATED ORAL at 16:33

## 2023-09-02 RX ADMIN — Medication 3 MILLILITER(S): at 16:00

## 2023-09-02 RX ADMIN — OXYCODONE HYDROCHLORIDE 10 MILLIGRAM(S): 5 TABLET ORAL at 22:00

## 2023-09-02 RX ADMIN — Medication 40 MILLIGRAM(S): at 21:21

## 2023-09-02 RX ADMIN — Medication 40 MILLIGRAM(S): at 14:16

## 2023-09-02 RX ADMIN — SODIUM CHLORIDE 50 MILLILITER(S): 9 INJECTION, SOLUTION INTRAVENOUS at 10:32

## 2023-09-02 RX ADMIN — MORPHINE SULFATE 15 MILLIGRAM(S): 50 CAPSULE, EXTENDED RELEASE ORAL at 11:36

## 2023-09-02 RX ADMIN — OXYCODONE HYDROCHLORIDE 10 MILLIGRAM(S): 5 TABLET ORAL at 19:15

## 2023-09-02 RX ADMIN — OXYCODONE HYDROCHLORIDE 10 MILLIGRAM(S): 5 TABLET ORAL at 12:33

## 2023-09-02 RX ADMIN — Medication 40 MILLIGRAM(S): at 05:19

## 2023-09-02 RX ADMIN — CEFEPIME 100 MILLIGRAM(S): 1 INJECTION, POWDER, FOR SOLUTION INTRAMUSCULAR; INTRAVENOUS at 05:18

## 2023-09-02 RX ADMIN — OXYCODONE HYDROCHLORIDE 10 MILLIGRAM(S): 5 TABLET ORAL at 22:15

## 2023-09-02 RX ADMIN — MORPHINE SULFATE 15 MILLIGRAM(S): 50 CAPSULE, EXTENDED RELEASE ORAL at 18:31

## 2023-09-02 RX ADMIN — Medication 3 MILLILITER(S): at 08:54

## 2023-09-02 NOTE — PROGRESS NOTE ADULT - ASSESSMENT
# acute hypercapnic respiratory failure 2/2 COPD exacerbation high risk due to Lung CA  # r/o ?CAPNA vs ?post obstructive PNA  - cefepime/azithro x 5 days  - steroids    # Lung CA w/ brain mets - stable  - f/u OP    BRBPR x 1 episode  - lovenox held  - pt high risk for DVT due to active malignancy, will likely restart heprain sq for DVT ppx in AM if no further bleeding  - GI eval

## 2023-09-02 NOTE — DIETITIAN INITIAL EVALUATION ADULT - ORAL INTAKE PTA/DIET HISTORY
as per pt poor appetite PTA for last several months with inability to obtain groceries. pt lives alone. RD provided pt with list of food pantries and coupons for ensure products. will request for case management consult to ensure safe discharge. pt states her UBW is 110# vs current weight of 87#    presently on a DASH/TLC diet <50% of meals consumed however pt has been given ensure in which she consumed 100%

## 2023-09-02 NOTE — DIETITIAN INITIAL EVALUATION ADULT - PERTINENT MEDS FT
MEDICATIONS  (STANDING):  albuterol/ipratropium for Nebulization 3 milliLiter(s) Nebulizer every 4 hours    cefepime   IVPB 2000 milliGRAM(s) IV Intermittent every 12 hours  chlorhexidine 2% Cloths 1 Application(s) Topical daily  dextrose 5% + sodium chloride 0.9%. 1000 milliLiter(s) (50 mL/Hr) IV Continuous <Continuous>  methylPREDNISolone sodium succinate Injectable 40 milliGRAM(s) IV Push every 8 hours  morphine ER Tablet 15 milliGRAM(s) Oral daily  pantoprazole  Injectable 40 milliGRAM(s) IV Push daily    MEDICATIONS  (PRN):  albuterol/ipratropium for Nebulization 3 milliLiter(s) Nebulizer every 6 hours PRN Shortness of Breath and/or Wheezing  guaifenesin/dextromethorphan Oral Liquid 10 milliLiter(s) Oral every 4 hours PRN Cough  HYDROmorphone  Injectable 0.25 milliGRAM(s) IV Push every 2 hours PRN Severe Pain (7 - 10)  oxyCODONE    IR 10 milliGRAM(s) Oral every 3 hours PRN Severe Pain (7 - 10)

## 2023-09-02 NOTE — PROGRESS NOTE ADULT - NUTRITIONAL ASSESSMENT
This patient has been assessed with a concern for Malnutrition and has been determined to have a diagnosis/diagnoses of Severe protein-calorie malnutrition and Underweight (BMI < 19).    This patient is being managed with:   Diet Regular-  DASH/TLC {Sodium & Cholesterol Restricted}  Entered: Sep  2 2023 10:27AM    The following pending diet order is being considered for treatment of Severe protein-calorie malnutrition and Underweight (BMI < 19):  Diet DASH/TLC-  Sodium & Cholesterol Restricted  Supplement Feeding Modality:  Oral  Ensure Plus High Protein Cans or Servings Per Day:  1       Frequency:  Three Times a day  Entered: Sep  2 2023  6:55PM

## 2023-09-02 NOTE — DIETITIAN INITIAL EVALUATION ADULT - ADD RECOMMEND
-consider appetite stimulant such as remeron or marrinol if not contraindicated   RD to monitor tolerance to po diet, labs/meds, NFPF and f/u as needed within 3 days  high risk

## 2023-09-02 NOTE — DIETITIAN INITIAL EVALUATION ADULT - PERTINENT LABORATORY DATA
09-02    137  |  93<L>  |  22<H>  ----------------------------<  152<H>  5.0   |  38<H>  |  0.5<L>    Ca    8.7      02 Sep 2023 05:27  Phos  3.1     09-02  Mg     2.2     09-02    TPro  6.5  /  Alb  3.4<L>  /  TBili  0.4  /  DBili  x   /  AST  26  /  ALT  26  /  AlkPhos  161<H>  09-02                          12.3   19.91 )-----------( 176      ( 02 Sep 2023 05:27 )             38.4

## 2023-09-02 NOTE — DIETITIAN INITIAL EVALUATION ADULT - OTHER INFO
pt is 58 year old female forme smoker with hx of lung CA with brain mets in active immuno therapy at [place], last treatment on 8/23, p/w acute SOB and productive cough. pt admitted with acute hypercapnic respiratory failure, COPD exacerbation, lung CA and possible PNA

## 2023-09-02 NOTE — DIETITIAN NUTRITION RISK NOTIFICATION - TREATMENT: THE FOLLOWING DIET HAS BEEN RECOMMENDED
Diet, DASH/TLC:   Sodium & Cholesterol Restricted  Supplement Feeding Modality:  Oral  Ensure Plus High Protein Cans or Servings Per Day:  1       Frequency:  Three Times a day (09-02-23 @ 18:56) [Pending Verification By Attending]  Diet, Regular:   DASH/TLC {Sodium & Cholesterol Restricted} (09-02-23 @ 10:27) [Active]

## 2023-09-02 NOTE — PROGRESS NOTE ADULT - ASSESSMENT
Impression:  Acute chronic COPD with exacerbation  Impending respiratory failure  pneumonia possible   lung cancer with METS to brain receiving chemo/immuno therapies    SUGGEST:    Check O2 sat on NC, record, continue O2 as necessary to maintain sats > 90%  IV solumedrol  bronchodilator treatments ATC and PRN  NIPPV continual for 24H then PRN as tolerated  bedrest  NPO  GI and DVT prophylaxis  pulmonary toilet  IV abx cefepime  procal  obtain full cultures sputum gm stain CS  check Respiratory viral panel, Strep and Legionella Ag.   Nasal MRSA  Pulmonary toilet  advance mobility as tolerated  Keep SaO2 >92% adjust O2 as needed  DVT/ Gastritis prophylaxis    Overall improving  SDU

## 2023-09-03 LAB
ALBUMIN SERPL ELPH-MCNC: 3.1 G/DL — LOW (ref 3.5–5.2)
ALP SERPL-CCNC: 166 U/L — HIGH (ref 30–115)
ALT FLD-CCNC: 59 U/L — HIGH (ref 0–41)
ANION GAP SERPL CALC-SCNC: 8 MMOL/L — SIGNIFICANT CHANGE UP (ref 7–14)
AST SERPL-CCNC: 66 U/L — HIGH (ref 0–41)
BASOPHILS # BLD AUTO: 0.09 K/UL — SIGNIFICANT CHANGE UP (ref 0–0.2)
BASOPHILS NFR BLD AUTO: 0.5 % — SIGNIFICANT CHANGE UP (ref 0–1)
BILIRUB SERPL-MCNC: 0.3 MG/DL — SIGNIFICANT CHANGE UP (ref 0.2–1.2)
BUN SERPL-MCNC: 18 MG/DL — SIGNIFICANT CHANGE UP (ref 10–20)
CALCIUM SERPL-MCNC: 9 MG/DL — SIGNIFICANT CHANGE UP (ref 8.4–10.5)
CHLORIDE SERPL-SCNC: 95 MMOL/L — LOW (ref 98–110)
CO2 SERPL-SCNC: 33 MMOL/L — HIGH (ref 17–32)
CREAT SERPL-MCNC: <0.5 MG/DL — LOW (ref 0.7–1.5)
EGFR: 114 ML/MIN/1.73M2 — SIGNIFICANT CHANGE UP
EOSINOPHIL # BLD AUTO: 0 K/UL — SIGNIFICANT CHANGE UP (ref 0–0.7)
EOSINOPHIL NFR BLD AUTO: 0 % — SIGNIFICANT CHANGE UP (ref 0–8)
GLUCOSE SERPL-MCNC: 121 MG/DL — HIGH (ref 70–99)
GRAM STN FLD: SIGNIFICANT CHANGE UP
HCT VFR BLD CALC: 33.4 % — LOW (ref 37–47)
HGB BLD-MCNC: 10.9 G/DL — LOW (ref 12–16)
IMM GRANULOCYTES NFR BLD AUTO: 2.6 % — HIGH (ref 0.1–0.3)
LEGIONELLA AG UR QL: NEGATIVE — SIGNIFICANT CHANGE UP
LEGIONELLA AG UR QL: NEGATIVE — SIGNIFICANT CHANGE UP
LYMPHOCYTES # BLD AUTO: 0.81 K/UL — LOW (ref 1.2–3.4)
LYMPHOCYTES # BLD AUTO: 4.1 % — LOW (ref 20.5–51.1)
MAGNESIUM SERPL-MCNC: 1.8 MG/DL — SIGNIFICANT CHANGE UP (ref 1.8–2.4)
MCHC RBC-ENTMCNC: 31.4 PG — HIGH (ref 27–31)
MCHC RBC-ENTMCNC: 32.6 G/DL — SIGNIFICANT CHANGE UP (ref 32–37)
MCV RBC AUTO: 96.3 FL — SIGNIFICANT CHANGE UP (ref 81–99)
MONOCYTES # BLD AUTO: 0.73 K/UL — HIGH (ref 0.1–0.6)
MONOCYTES NFR BLD AUTO: 3.7 % — SIGNIFICANT CHANGE UP (ref 1.7–9.3)
NEUTROPHILS # BLD AUTO: 17.84 K/UL — HIGH (ref 1.4–6.5)
NEUTROPHILS NFR BLD AUTO: 89.1 % — HIGH (ref 42.2–75.2)
NRBC # BLD: 0 /100 WBCS — SIGNIFICANT CHANGE UP (ref 0–0)
PHOSPHATE SERPL-MCNC: 0.8 MG/DL — LOW (ref 2.1–4.9)
PLATELET # BLD AUTO: 144 K/UL — SIGNIFICANT CHANGE UP (ref 130–400)
PMV BLD: 11.1 FL — HIGH (ref 7.4–10.4)
POTASSIUM SERPL-MCNC: 4.4 MMOL/L — SIGNIFICANT CHANGE UP (ref 3.5–5)
POTASSIUM SERPL-SCNC: 4.4 MMOL/L — SIGNIFICANT CHANGE UP (ref 3.5–5)
PROT SERPL-MCNC: 5.9 G/DL — LOW (ref 6–8)
RAPID RVP RESULT: SIGNIFICANT CHANGE UP
RBC # BLD: 3.47 M/UL — LOW (ref 4.2–5.4)
RBC # FLD: 15.3 % — HIGH (ref 11.5–14.5)
S PNEUM AG UR QL: NEGATIVE — SIGNIFICANT CHANGE UP
SARS-COV-2 RNA SPEC QL NAA+PROBE: SIGNIFICANT CHANGE UP
SODIUM SERPL-SCNC: 136 MMOL/L — SIGNIFICANT CHANGE UP (ref 135–146)
SPECIMEN SOURCE: SIGNIFICANT CHANGE UP
WBC # BLD: 19.99 K/UL — HIGH (ref 4.8–10.8)
WBC # FLD AUTO: 19.99 K/UL — HIGH (ref 4.8–10.8)

## 2023-09-03 PROCEDURE — 93306 TTE W/DOPPLER COMPLETE: CPT | Mod: 26

## 2023-09-03 PROCEDURE — 99222 1ST HOSP IP/OBS MODERATE 55: CPT

## 2023-09-03 PROCEDURE — 99232 SBSQ HOSP IP/OBS MODERATE 35: CPT

## 2023-09-03 RX ORDER — ENOXAPARIN SODIUM 100 MG/ML
40 INJECTION SUBCUTANEOUS EVERY 24 HOURS
Refills: 0 | Status: DISCONTINUED | OUTPATIENT
Start: 2023-09-03 | End: 2023-09-04

## 2023-09-03 RX ORDER — POLYETHYLENE GLYCOL 3350 17 G/17G
17 POWDER, FOR SOLUTION ORAL DAILY
Refills: 0 | Status: DISCONTINUED | OUTPATIENT
Start: 2023-09-03 | End: 2023-09-04

## 2023-09-03 RX ORDER — SENNA PLUS 8.6 MG/1
2 TABLET ORAL AT BEDTIME
Refills: 0 | Status: DISCONTINUED | OUTPATIENT
Start: 2023-09-03 | End: 2023-09-04

## 2023-09-03 RX ORDER — AZITHROMYCIN 500 MG/1
1 TABLET, FILM COATED ORAL
Qty: 5 | Refills: 0
Start: 2023-09-03 | End: 2023-09-07

## 2023-09-03 RX ORDER — CEFPODOXIME PROXETIL 100 MG
1 TABLET ORAL
Qty: 10 | Refills: 0
Start: 2023-09-03 | End: 2023-09-07

## 2023-09-03 RX ADMIN — SENNA PLUS 2 TABLET(S): 8.6 TABLET ORAL at 21:51

## 2023-09-03 RX ADMIN — OXYCODONE HYDROCHLORIDE 10 MILLIGRAM(S): 5 TABLET ORAL at 08:16

## 2023-09-03 RX ADMIN — Medication 40 MILLIGRAM(S): at 05:27

## 2023-09-03 RX ADMIN — Medication 40 MILLIGRAM(S): at 14:25

## 2023-09-03 RX ADMIN — Medication 3 MILLILITER(S): at 19:46

## 2023-09-03 RX ADMIN — OXYCODONE HYDROCHLORIDE 10 MILLIGRAM(S): 5 TABLET ORAL at 18:48

## 2023-09-03 RX ADMIN — PANTOPRAZOLE SODIUM 40 MILLIGRAM(S): 20 TABLET, DELAYED RELEASE ORAL at 12:12

## 2023-09-03 RX ADMIN — OXYCODONE HYDROCHLORIDE 10 MILLIGRAM(S): 5 TABLET ORAL at 01:02

## 2023-09-03 RX ADMIN — CEFEPIME 100 MILLIGRAM(S): 1 INJECTION, POWDER, FOR SOLUTION INTRAMUSCULAR; INTRAVENOUS at 05:27

## 2023-09-03 RX ADMIN — MORPHINE SULFATE 15 MILLIGRAM(S): 50 CAPSULE, EXTENDED RELEASE ORAL at 12:51

## 2023-09-03 RX ADMIN — OXYCODONE HYDROCHLORIDE 10 MILLIGRAM(S): 5 TABLET ORAL at 04:45

## 2023-09-03 RX ADMIN — OXYCODONE HYDROCHLORIDE 10 MILLIGRAM(S): 5 TABLET ORAL at 12:51

## 2023-09-03 RX ADMIN — Medication 3 MILLILITER(S): at 08:58

## 2023-09-03 RX ADMIN — Medication 3 MILLILITER(S): at 17:40

## 2023-09-03 RX ADMIN — CEFEPIME 100 MILLIGRAM(S): 1 INJECTION, POWDER, FOR SOLUTION INTRAMUSCULAR; INTRAVENOUS at 18:11

## 2023-09-03 RX ADMIN — AZITHROMYCIN 500 MILLIGRAM(S): 500 TABLET, FILM COATED ORAL at 12:09

## 2023-09-03 RX ADMIN — OXYCODONE HYDROCHLORIDE 10 MILLIGRAM(S): 5 TABLET ORAL at 09:00

## 2023-09-03 RX ADMIN — OXYCODONE HYDROCHLORIDE 10 MILLIGRAM(S): 5 TABLET ORAL at 12:13

## 2023-09-03 RX ADMIN — OXYCODONE HYDROCHLORIDE 10 MILLIGRAM(S): 5 TABLET ORAL at 22:45

## 2023-09-03 RX ADMIN — Medication 40 MILLIGRAM(S): at 21:52

## 2023-09-03 RX ADMIN — MORPHINE SULFATE 15 MILLIGRAM(S): 50 CAPSULE, EXTENDED RELEASE ORAL at 12:12

## 2023-09-03 RX ADMIN — OXYCODONE HYDROCHLORIDE 10 MILLIGRAM(S): 5 TABLET ORAL at 21:51

## 2023-09-03 RX ADMIN — OXYCODONE HYDROCHLORIDE 10 MILLIGRAM(S): 5 TABLET ORAL at 15:41

## 2023-09-03 RX ADMIN — OXYCODONE HYDROCHLORIDE 10 MILLIGRAM(S): 5 TABLET ORAL at 00:01

## 2023-09-03 RX ADMIN — ENOXAPARIN SODIUM 40 MILLIGRAM(S): 100 INJECTION SUBCUTANEOUS at 09:34

## 2023-09-03 RX ADMIN — OXYCODONE HYDROCHLORIDE 10 MILLIGRAM(S): 5 TABLET ORAL at 04:02

## 2023-09-03 RX ADMIN — OXYCODONE HYDROCHLORIDE 10 MILLIGRAM(S): 5 TABLET ORAL at 16:41

## 2023-09-03 NOTE — PROGRESS NOTE ADULT - ASSESSMENT
Impression:  Acute chronic COPD with exacerbation  Impending respiratory failure  pneumonia possible   lung cancer with METS to brain receiving chemo/immuno therapies    SUGGEST:    Check O2 sat on NC, record, continue O2 as necessary to maintain sats > 90%  Complete 5-day course of 40mg prednisone (or IV equivalent)  bronchodilator treatments PRN  NIPPV QHS and if needed during the day  OOB, PT/OT  regular diet  DVT prophylaxis  pulmonary toilet  IV abx cefepime, complete 7-day course of empiric Abx for severe CAP, narrow if culture data allows  procal low/borderline  obtain full cultures sputum gm stain CS, follow-up results  check Respiratory viral panel, Strep and Legionella Ag.   Keep SaO2 >92% adjust O2 as needed      Overall improving  DGTF Impression:  Acute chronic COPD with exacerbation  Impending respiratory failure  pneumonia possible   lung cancer with METS to brain receiving chemo/immuno therapies    SUGGEST:    Check O2 sat on NC, record, continue O2 as necessary to maintain sats > 90%  Complete 5-day course of 40mg prednisone (or IV equivalent)  bronchodilator treatments PRN  NIPPV QHS and if needed during the day  OOB, PT/OT  regular diet  DVT prophylaxis  pulmonary toilet  IV abx cefepime, complete 7-day course of empiric Abx for severe CAP, narrow if culture data allows  procal low/borderline  obtain full cultures sputum gm stain CS, follow-up results  check Respiratory viral panel, Strep and Legionella Ag.   Keep SaO2 >92% adjust O2 as needed  Ambulatory oximetry prior to discharge  Adequate pain control for cancer patient      Overall improving  DGTF

## 2023-09-03 NOTE — PROGRESS NOTE ADULT - SUBJECTIVE AND OBJECTIVE BOX
Pt seen and examined, daughter at bedside, who is an RN    T(F): , Max: 98.4 (09-02-23 @ 19:00)  HR: 69 (09-02-23 @ 23:00) (61 - 97)  BP: 129/70 (09-02-23 @ 23:00)  RR: 28 (09-02-23 @ 21:00)  SpO2: 100% (09-02-23 @ 23:00)  IN: 1250 mL / OUT: 255 mL / NET: 995 mL    IN: 525 mL / OUT: 525 mL / NET: 0 mL      General: No apparent distress  Cardiovascular: S1, S2  Gastrointestinal: Soft, Non-tender, Non-distended  Respiratory: Good air entry bilaterally  Musculoskeletal: Moves all extremities  Lymphatic: No edema  Neurologic: No gross motor deficit  Dermatologic: Skin dry  PT/INR - ( 01 Sep 2023 11:30 )   PT: 14.30 sec;   INR: 1.25 ratio         PTT - ( 01 Sep 2023 11:30 )  PTT:32.6 sec                        12.3   19.91 )-----------( 176      ( 02 Sep 2023 05:27 )             38.4     09-02    137  |  93<L>  |  22<H>  ----------------------------<  152<H>  5.0   |  38<H>  |  0.5<L>    Ca    8.7      02 Sep 2023 05:27  Phos  3.1     09-02  Mg     2.2     09-02    TPro  6.5  /  Alb  3.4<L>  /  TBili  0.4  /  DBili  x   /  AST  26  /  ALT  26  /  AlkPhos  161<H>  09-02      Culture - Blood (collected 01 Sep 2023 16:50)  Source: .Blood Blood  Preliminary Report (02 Sep 2023 23:02):    No growth at 24 hours    
Patient is a 58y old  Female who presents with a chief complaint of       Over Night Events:    off NIV now, doing well on 6L likely can decrease  pCO2 improving    ROS:     All ROS are negative except HPI           PHYSICAL EXAM    ICU Vital Signs Last 24 Hrs  T(C): 36.1 (02 Sep 2023 03:00), Max: 37.5 (01 Sep 2023 19:00)  T(F): 97 (02 Sep 2023 03:00), Max: 99.5 (01 Sep 2023 19:00)  HR: 70 (02 Sep 2023 06:00) (61 - 93)  BP: 119/65 (02 Sep 2023 06:00) (93/55 - 165/82)  BP(mean): 87 (02 Sep 2023 06:00) (69 - 108)  ABP: --  ABP(mean): --  RR: 20 (02 Sep 2023 06:00) (18 - 28)  SpO2: 96% (02 Sep 2023 06:00) (93% - 100%)    O2 Parameters below as of 02 Sep 2023 06:00  Patient On (Oxygen Delivery Method): nasal cannula  O2 Flow (L/min): 6          Constitutional: no acute distress, well nourished well developed  Neuro: moving all 4 limbs spontaneously, no facial droop or dysarthria  HEENT: NCAT, anicteric  Neck: no visible lymphadenopathy or goiter  Pulm: no respiratory distress. clear to auscultation bilaterally  Cardiac: extremities appear pink and well-perfused.  regular rhythm and rate, no murmur detected  Abdomen: non-distended  Extremities: no peripheral edema      09-01-23 @ 07:01  -  09-02-23 @ 06:54  --------------------------------------------------------  IN:    IV PiggyBack: 250 mL    IV PiggyBack: 100 mL    Lactated Ringers: 900 mL  Total IN: 1250 mL    OUT:    Voided (mL): 255 mL  Total OUT: 255 mL    Total NET: 995 mL          LABS:                            12.3   19.91 )-----------( 176      ( 02 Sep 2023 05:27 )             38.4                                               09-01    136  |  92<L>  |  32<H>  ----------------------------<  179<H>  4.3   |  34<H>  |  0.6<L>    Ca    9.2      01 Sep 2023 11:30  Mg     2.1     09-01    TPro  7.1  /  Alb  3.8  /  TBili  0.5  /  DBili  x   /  AST  24  /  ALT  25  /  AlkPhos  172<H>  09-01      PT/INR - ( 01 Sep 2023 11:30 )   PT: 14.30 sec;   INR: 1.25 ratio         PTT - ( 01 Sep 2023 11:30 )  PTT:32.6 sec                                       Urinalysis Basic - ( 02 Sep 2023 00:50 )    Color: Yellow / Appearance: Turbid / SG: >1.030 / pH: x  Gluc: x / Ketone: Trace mg/dL  / Bili: Negative / Urobili: 1.0 mg/dL   Blood: x / Protein: 100 mg/dL / Nitrite: Negative   Leuk Esterase: Negative / RBC: 8 /HPF / WBC 2 /HPF   Sq Epi: x / Non Sq Epi: x / Bacteria: Moderate /HPF        CARDIAC MARKERS ( 01 Sep 2023 11:30 )  x     / <0.01 ng/mL / x     / x     / x                                                LIVER FUNCTIONS - ( 01 Sep 2023 11:30 )  Alb: 3.8 g/dL / Pro: 7.1 g/dL / ALK PHOS: 172 U/L / ALT: 25 U/L / AST: 24 U/L / GGT: x                                                                                                                                   ABG - ( 01 Sep 2023 20:30 )  pH, Arterial: 7.46  pH, Blood: x     /  pCO2: 58    /  pO2: 161   / HCO3: 41    / Base Excess: 14.9  /  SaO2: 99.1                MEDICATIONS  (STANDING):  albuterol/ipratropium for Nebulization 3 milliLiter(s) Nebulizer every 6 hours  azithromycin  IVPB      azithromycin  IVPB 500 milliGRAM(s) IV Intermittent every 24 hours  cefepime   IVPB 2000 milliGRAM(s) IV Intermittent every 12 hours  cefepime   IVPB      chlorhexidine 2% Cloths 1 Application(s) Topical daily  enoxaparin Injectable 40 milliGRAM(s) SubCutaneous every 24 hours  methylPREDNISolone sodium succinate Injectable 40 milliGRAM(s) IV Push every 8 hours  morphine ER Tablet 15 milliGRAM(s) Oral daily    MEDICATIONS  (PRN):  albuterol    90 MICROgram(s) HFA Inhaler 2 Puff(s) Inhalation every 4 hours PRN Wheezing  guaifenesin/dextromethorphan Oral Liquid 10 milliLiter(s) Oral every 4 hours PRN Cough  HYDROmorphone  Injectable 0.25 milliGRAM(s) IV Push every 2 hours PRN Severe Pain (7 - 10)  oxyCODONE    IR 10 milliGRAM(s) Oral every 3 hours PRN Severe Pain (7 - 10)      New X-rays reviewed:       ECHO reviewed    CXR interpreted by me:    9/2  images and radiologist read reviewed, by my read demonstrating borderline hyperinflation with LUZ opacity  
Patient is a 58y old  Female who presents with a chief complaint of Acute respiratory failure, unspecified whether with hypoxia or hypercapnia     (02 Sep 2023 18:50)        Over Night Events:    remains on NC 5L, but satting 99    ROS:     All ROS are negative except HPI           PHYSICAL EXAM    ICU Vital Signs Last 24 Hrs  T(C): 36 (03 Sep 2023 03:03), Max: 36.9 (02 Sep 2023 19:00)  T(F): 96.8 (03 Sep 2023 03:03), Max: 98.4 (02 Sep 2023 19:00)  HR: 55 (03 Sep 2023 05:00) (55 - 97)  BP: 137/74 (03 Sep 2023 05:00) (114/55 - 147/71)  BP(mean): 100 (03 Sep 2023 05:00) (78 - 102)  ABP: --  ABP(mean): --  RR: 18 (03 Sep 2023 03:03) (18 - 42)  SpO2: 98% (03 Sep 2023 05:00) (91% - 100%)    O2 Parameters below as of 02 Sep 2023 11:40  Patient On (Oxygen Delivery Method): nasal cannula  O2 Flow (L/min): 5          Constitutional: no acute distress, well nourished well developed  Neuro: moving all 4 limbs spontaneously, no facial droop or dysarthria  HEENT: NCAT, anicteric  Neck: no visible lymphadenopathy or goiter  Pulm: no respiratory distress. clear to auscultation bilaterally  Cardiac: extremities appear pink and well-perfused.  regular rhythm and rate, no murmur detected  Abdomen: non-distended  Extremities: no peripheral edema      09-01-23 @ 07:01  -  09-02-23 @ 07:00  --------------------------------------------------------  IN:    IV PiggyBack: 250 mL    IV PiggyBack: 100 mL    Lactated Ringers: 900 mL  Total IN: 1250 mL    OUT:    Voided (mL): 255 mL  Total OUT: 255 mL    Total NET: 995 mL      09-02-23 @ 07:01  -  09-03-23 @ 06:09  --------------------------------------------------------  IN:    dextrose 5% + sodium chloride 0.9%: 675 mL    Oral Fluid: 240 mL  Total IN: 915 mL    OUT:    Voided (mL): 525 mL  Total OUT: 525 mL    Total NET: 390 mL          LABS:                            12.3   19.91 )-----------( 176      ( 02 Sep 2023 05:27 )             38.4                                               09-02    137  |  93<L>  |  22<H>  ----------------------------<  152<H>  5.0   |  38<H>  |  0.5<L>    Ca    8.7      02 Sep 2023 05:27  Phos  3.1     09-02  Mg     2.2     09-02    TPro  6.5  /  Alb  3.4<L>  /  TBili  0.4  /  DBili  x   /  AST  26  /  ALT  26  /  AlkPhos  161<H>  09-02      PT/INR - ( 01 Sep 2023 11:30 )   PT: 14.30 sec;   INR: 1.25 ratio         PTT - ( 01 Sep 2023 11:30 )  PTT:32.6 sec                                       Urinalysis Basic - ( 02 Sep 2023 05:27 )    Color: x / Appearance: x / SG: x / pH: x  Gluc: 152 mg/dL / Ketone: x  / Bili: x / Urobili: x   Blood: x / Protein: x / Nitrite: x   Leuk Esterase: x / RBC: x / WBC x   Sq Epi: x / Non Sq Epi: x / Bacteria: x        CARDIAC MARKERS ( 01 Sep 2023 11:30 )  x     / <0.01 ng/mL / x     / x     / x                                                LIVER FUNCTIONS - ( 02 Sep 2023 05:27 )  Alb: 3.4 g/dL / Pro: 6.5 g/dL / ALK PHOS: 161 U/L / ALT: 26 U/L / AST: 26 U/L / GGT: x                                                  Culture - Blood (collected 01 Sep 2023 16:50)  Source: .Blood Blood  Preliminary Report (02 Sep 2023 23:02):    No growth at 24 hours                                                                                       ABG - ( 01 Sep 2023 20:30 )  pH, Arterial: 7.46  pH, Blood: x     /  pCO2: 58    /  pO2: 161   / HCO3: 41    / Base Excess: 14.9  /  SaO2: 99.1                MEDICATIONS  (STANDING):  albuterol/ipratropium for Nebulization 3 milliLiter(s) Nebulizer every 4 hours  azithromycin   Tablet 500 milliGRAM(s) Oral daily  cefepime   IVPB      cefepime   IVPB 2000 milliGRAM(s) IV Intermittent every 12 hours  chlorhexidine 2% Cloths 1 Application(s) Topical daily  dextrose 5% + sodium chloride 0.9%. 1000 milliLiter(s) (50 mL/Hr) IV Continuous <Continuous>  methylPREDNISolone sodium succinate Injectable 40 milliGRAM(s) IV Push every 8 hours  morphine ER Tablet 15 milliGRAM(s) Oral daily  pantoprazole  Injectable 40 milliGRAM(s) IV Push daily    MEDICATIONS  (PRN):  albuterol/ipratropium for Nebulization 3 milliLiter(s) Nebulizer every 6 hours PRN Shortness of Breath and/or Wheezing  guaifenesin/dextromethorphan Oral Liquid 10 milliLiter(s) Oral every 4 hours PRN Cough  HYDROmorphone  Injectable 0.25 milliGRAM(s) IV Push every 2 hours PRN Severe Pain (7 - 10)  oxyCODONE    IR 10 milliGRAM(s) Oral every 3 hours PRN Severe Pain (7 - 10)      New X-rays reviewed:       ECHO reviewed    CXR interpreted by me:    9/3  images and radiologist read reviewed, by my read demonstrating stable right middle field opacity, left apex opacification  
Pt seen, family at bedside concerned that pt may try to leave AMA    T(F): , Max: 98.4 (09-02-23 @ 19:00)  HR: 75 (09-03-23 @ 14:22) (55 - 88)  BP: 144/67 (09-03-23 @ 14:22)  RR: 19 (09-03-23 @ 07:00)  SpO2: 95% (09-03-23 @ 18:22)  IN: 1305 mL / OUT: 825 mL / NET: 480 mL    IN: 150 mL / OUT: 400 mL / NET: -250 mL      General: No apparent distress  Cardiovascular: S1, S2  Gastrointestinal: Soft, Non-tender, Non-distended  Respiratory: Good air entry bilaterally  Musculoskeletal: Moves all extremities  Lymphatic: No edema  Neurologic: No gross motor deficit  Dermatologic: Skin dry                          10.9   19.99 )-----------( 144      ( 03 Sep 2023 05:25 )             33.4     09-03    136  |  95<L>  |  18  ----------------------------<  121<H>  4.4   |  33<H>  |  <0.5<L>    Ca    9.0      03 Sep 2023 05:25  Phos  0.8     09-03  Mg     1.8     09-03    TPro  5.9<L>  /  Alb  3.1<L>  /  TBili  0.3  /  DBili  x   /  AST  66<H>  /  ALT  59<H>  /  AlkPhos  166<H>  09-03      Culture - Blood (collected 01 Sep 2023 16:50)  Source: .Blood Blood  Preliminary Report (02 Sep 2023 23:02):    No growth at 24 hours

## 2023-09-03 NOTE — CHART NOTE - NSCHARTNOTEFT_GEN_A_CORE
TRANSFER NOTE  SDU -> Medical floor    57yo former smoking woman pmhx known lung CA w/ brain mets in active immuno therapy peme/pembro following at [place] last treatment 8/23, migraines presents w/ family from home w/ acute SOB  Pt was in her usual state of health until approximately two days prior when pt started complaining of productive cough, increased secretions; Per family last chest imaging at [place] showed inflammation vs infection"; This AM daughter called for well check and she was disoriented and complaining of difficulty breathing. Alerted EMS who reported hypoxia on seen and transported to ED  Of note per family last WBC 8/17 6.8  Last immunotherapy 8/23  HCP Daughter Judit Dawson RN works in [place] [facility] w/ bad cell reception can contact on teams  family endorses productive cough, sob, robles,   family denies fever, n/v/d, sick contacts  In the ED, ED /72 HR 88 RR 20 100% NRB T 96.8  WBC 25.34 Hg 12.5 Plt 213 BUN 32 Cr 0.6 BNP 3087 Trop negative   VBG pH7.19 pCO2 112 pO2 41 BiCarb 43  Pt given a duoneb and put on BiPap with improved VBG  VBG 7.23 pCO2 97 pO2 38 BiCarb 41  Pt admitted for further management    SDU Course:  Patient monitored in step down unit. Had one episode of BRBPR on 9/2 (per Hospitalist notes), no further episdoes, H&H stable GI consulted. Patient being weaned off NC. BIPAP qhs and prn. To complete 5 day course of steroids and 7 day course of antibiotics for possible pneumonia coverage. Downgraded to floor 9/3/23.       ASSESSMENT & PLAN:   # acute hypercapnic respiratory failure 2/2 COPD exacerbation high risk due to Lung CA  # r/o ?CAPNA vs ?post obstructive PNA  - cefepime/azithro x 5 days  - steroids  - obtain sputum culture; rvp negative; procal borderline 0.23    # Lung CA w/ brain mets - stable  - pain adequately controlled  - f/u OP    # s/p BRBPR x 1 episode 9/2  - pt high risk for DVT due to active malignancy, no further episodes resumed Lovenox  - GI eval      For Follow-Up:  -GI eval  -sputum culture  -complete steroid and antibiotic course      Vital Signs Last 24 Hrs  T(C): 36.2 (03 Sep 2023 07:00), Max: 36.9 (02 Sep 2023 19:00)  T(F): 97.2 (03 Sep 2023 07:00), Max: 98.4 (02 Sep 2023 19:00)  HR: 61 (03 Sep 2023 07:08) (55 - 97)  BP: 153/68 (03 Sep 2023 07:08) (114/55 - 153/68)  BP(mean): 98 (03 Sep 2023 07:08) (78 - 100)  RR: 19 (03 Sep 2023 07:00) (18 - 42)  SpO2: 96% (03 Sep 2023 07:08) (91% - 100%)    Parameters below as of 02 Sep 2023 11:40  Patient On (Oxygen Delivery Method): nasal cannula  O2 Flow (L/min): 5    I&O's Summary    02 Sep 2023 07:01  -  03 Sep 2023 07:00  --------------------------------------------------------  IN: 1305 mL / OUT: 825 mL / NET: 480 mL          MEDICATIONS  (STANDING):  albuterol/ipratropium for Nebulization 3 milliLiter(s) Nebulizer every 4 hours  azithromycin   Tablet 500 milliGRAM(s) Oral daily  cefepime   IVPB 2000 milliGRAM(s) IV Intermittent every 12 hours  cefepime   IVPB      chlorhexidine 2% Cloths 1 Application(s) Topical daily  dextrose 5% + sodium chloride 0.9%. 1000 milliLiter(s) (50 mL/Hr) IV Continuous <Continuous>  enoxaparin Injectable 40 milliGRAM(s) SubCutaneous every 24 hours  methylPREDNISolone sodium succinate Injectable 40 milliGRAM(s) IV Push every 8 hours  morphine ER Tablet 15 milliGRAM(s) Oral daily  pantoprazole  Injectable 40 milliGRAM(s) IV Push daily    MEDICATIONS  (PRN):  albuterol/ipratropium for Nebulization 3 milliLiter(s) Nebulizer every 6 hours PRN Shortness of Breath and/or Wheezing  guaifenesin/dextromethorphan Oral Liquid 10 milliLiter(s) Oral every 4 hours PRN Cough  HYDROmorphone  Injectable 0.25 milliGRAM(s) IV Push every 2 hours PRN Severe Pain (7 - 10)  oxyCODONE    IR 10 milliGRAM(s) Oral every 3 hours PRN Severe Pain (7 - 10)        LABS                                            10.9                  Neurophils% (auto):   89.1   (09-03 @ 05:25):    19.99)-----------(144          Lymphocytes% (auto):  4.1                                           33.4                   Eosinphils% (auto):   0.0      Manual%: Neutrophils x    ; Lymphocytes x    ; Eosinophils x    ; Bands%: x    ; Blasts x                                    136    |  95     |  18                  Calcium: 9.0   / iCa: x      (09-03 @ 05:25)    ----------------------------<  121       Magnesium: 1.8                              4.4     |  33     |  <0.5             Phosphorous: 0.8      TPro  5.9    /  Alb  3.1    /  TBili  0.3    /  DBili  x      /  AST  66     /  ALT  59     /  AlkPhos  166    03 Sep 2023 05:25 TRANSFER NOTE  SDU -> Medical floor    57yo former smoking woman pmhx known lung CA w/ brain mets in active immuno therapy peme/pembro following at [facility] last treatment 8/23, migraines presents w/ family from home w/ acute SOB  Pt was in her usual state of health until approximately two days prior when pt started complaining of productive cough, increased secretions; Per family last chest imaging at [place] showed inflammation vs infection"; This AM daughter called for well check and she was disoriented and complaining of difficulty breathing. Alerted EMS who reported hypoxia on seen and transported to ED  Of note per family last WBC 8/17 6.8  Last immunotherapy 8/23  HCP Daughter Judit Dawson RN works in [place] [facility] w/ bad cell reception can contact on teams  family endorses productive cough, sob, robles,   family denies fever, n/v/d, sick contacts  In the ED, ED /72 HR 88 RR 20 100% NRB T 96.8  WBC 25.34 Hg 12.5 Plt 213 BUN 32 Cr 0.6 BNP 3087 Trop negative   VBG pH7.19 pCO2 112 pO2 41 BiCarb 43  Pt given a duoneb and put on BiPap with improved VBG  VBG 7.23 pCO2 97 pO2 38 BiCarb 41  Pt admitted for further management    SDU Course:  Patient monitored in step down unit. Had one episode of BRBPR on 9/2 (per Hospitalist notes), no further episdoes, H&H stable GI consulted. Patient being weaned off NC. BIPAP qhs and prn. To complete 5 day course of steroids and 7 day course of antibiotics for possible pneumonia coverage. Downgraded to floor 9/3/23.       ASSESSMENT & PLAN:   # acute hypercapnic respiratory failure 2/2 COPD exacerbation high risk due to Lung CA  # r/o ?CAPNA vs ?post obstructive PNA  - cefepime/azithro x 7 days  - steroids x5 days  - obtain sputum culture; rvp negative; procal borderline 0.23    # Lung CA w/ brain mets - stable  - pain adequately controlled  - f/u OP    # s/p BRBPR x 1 episode 9/2  - pt high risk for DVT due to active malignancy, no further episodes resumed Lovenox  - GI eval      For Follow-Up:  -GI eval  -sputum culture  -complete steroid and antibiotic course      Vital Signs Last 24 Hrs  T(C): 36.2 (03 Sep 2023 07:00), Max: 36.9 (02 Sep 2023 19:00)  T(F): 97.2 (03 Sep 2023 07:00), Max: 98.4 (02 Sep 2023 19:00)  HR: 61 (03 Sep 2023 07:08) (55 - 97)  BP: 153/68 (03 Sep 2023 07:08) (114/55 - 153/68)  BP(mean): 98 (03 Sep 2023 07:08) (78 - 100)  RR: 19 (03 Sep 2023 07:00) (18 - 42)  SpO2: 96% (03 Sep 2023 07:08) (91% - 100%)    Parameters below as of 02 Sep 2023 11:40  Patient On (Oxygen Delivery Method): nasal cannula  O2 Flow (L/min): 5    I&O's Summary    02 Sep 2023 07:01  -  03 Sep 2023 07:00  --------------------------------------------------------  IN: 1305 mL / OUT: 825 mL / NET: 480 mL          MEDICATIONS  (STANDING):  albuterol/ipratropium for Nebulization 3 milliLiter(s) Nebulizer every 4 hours  azithromycin   Tablet 500 milliGRAM(s) Oral daily  cefepime   IVPB 2000 milliGRAM(s) IV Intermittent every 12 hours  cefepime   IVPB      chlorhexidine 2% Cloths 1 Application(s) Topical daily  dextrose 5% + sodium chloride 0.9%. 1000 milliLiter(s) (50 mL/Hr) IV Continuous <Continuous>  enoxaparin Injectable 40 milliGRAM(s) SubCutaneous every 24 hours  methylPREDNISolone sodium succinate Injectable 40 milliGRAM(s) IV Push every 8 hours  morphine ER Tablet 15 milliGRAM(s) Oral daily  pantoprazole  Injectable 40 milliGRAM(s) IV Push daily    MEDICATIONS  (PRN):  albuterol/ipratropium for Nebulization 3 milliLiter(s) Nebulizer every 6 hours PRN Shortness of Breath and/or Wheezing  guaifenesin/dextromethorphan Oral Liquid 10 milliLiter(s) Oral every 4 hours PRN Cough  HYDROmorphone  Injectable 0.25 milliGRAM(s) IV Push every 2 hours PRN Severe Pain (7 - 10)  oxyCODONE    IR 10 milliGRAM(s) Oral every 3 hours PRN Severe Pain (7 - 10)        LABS                                            10.9                  Neurophils% (auto):   89.1   (09-03 @ 05:25):    19.99)-----------(144          Lymphocytes% (auto):  4.1                                           33.4                   Eosinphils% (auto):   0.0      Manual%: Neutrophils x    ; Lymphocytes x    ; Eosinophils x    ; Bands%: x    ; Blasts x                                    136    |  95     |  18                  Calcium: 9.0   / iCa: x      (09-03 @ 05:25)    ----------------------------<  121       Magnesium: 1.8                              4.4     |  33     |  <0.5             Phosphorous: 0.8      TPro  5.9    /  Alb  3.1    /  TBili  0.3    /  DBili  x      /  AST  66     /  ALT  59     /  AlkPhos  166    03 Sep 2023 05:25 TRANSFER NOTE  SDU -> Medical floor    57yo former smoking woman pmhx known lung CA w/ brain mets in active immuno therapy peme/pembro following at [facility] last treatment 8/23, migraines presents w/ family from home w/ acute SOB  Pt was in her usual state of health until approximately two days prior when pt started complaining of productive cough, increased secretions; Per family last chest imaging at [place] showed inflammation vs infection"; This AM daughter called for well check and she was disoriented and complaining of difficulty breathing. Alerted EMS who reported hypoxia on seen and transported to ED  Of note per family last WBC 8/17 6.8  Last immunotherapy 8/23  HCP Daughter Judit Dawson RN works in [place] ED w/ bad cell reception can contact on teams  family endorses productive cough, sob, robles,   family denies fever, n/v/d, sick contacts  In the ED, ED /72 HR 88 RR 20 100% NRB T 96.8  WBC 25.34 Hg 12.5 Plt 213 BUN 32 Cr 0.6 BNP 3087 Trop negative   VBG pH7.19 pCO2 112 pO2 41 BiCarb 43  Pt given a duoneb and put on BiPap with improved VBG  VBG 7.23 pCO2 97 pO2 38 BiCarb 41  Pt admitted for further management    SDU Course:  Patient monitored in step down unit. Had one episode of BRBPR on 9/2 (per Hospitalist notes), no further episdoes, H&H stable GI consulted. Patient being weaned off NC. BIPAP qhs and prn. To complete 5 day course of steroids and 7 day course of antibiotics for possible pneumonia coverage. Overall improving. Downgraded to floor 9/3/23.       ASSESSMENT & PLAN:   # acute hypercapnic respiratory failure 2/2 COPD exacerbation high risk due to Lung CA  # r/o ?CAPNA vs ?post obstructive PNA  - cefepime/azithro x 7 days  - steroids x5 days  - obtain sputum culture; rvp negative; procal borderline 0.23    # Lung CA w/ brain mets - stable  - pain adequately controlled  - f/u OP    # s/p BRBPR x 1 episode 9/2  - pt high risk for DVT due to active malignancy, no further episodes resumed Lovenox  - GI eval      For Follow-Up:  -GI eval  -sputum culture  -complete steroid and antibiotic course      Vital Signs Last 24 Hrs  T(C): 36.2 (03 Sep 2023 07:00), Max: 36.9 (02 Sep 2023 19:00)  T(F): 97.2 (03 Sep 2023 07:00), Max: 98.4 (02 Sep 2023 19:00)  HR: 61 (03 Sep 2023 07:08) (55 - 97)  BP: 153/68 (03 Sep 2023 07:08) (114/55 - 153/68)  BP(mean): 98 (03 Sep 2023 07:08) (78 - 100)  RR: 19 (03 Sep 2023 07:00) (18 - 42)  SpO2: 96% (03 Sep 2023 07:08) (91% - 100%)    Parameters below as of 02 Sep 2023 11:40  Patient On (Oxygen Delivery Method): nasal cannula  O2 Flow (L/min): 5    I&O's Summary    02 Sep 2023 07:01  -  03 Sep 2023 07:00  --------------------------------------------------------  IN: 1305 mL / OUT: 825 mL / NET: 480 mL          MEDICATIONS  (STANDING):  albuterol/ipratropium for Nebulization 3 milliLiter(s) Nebulizer every 4 hours  azithromycin   Tablet 500 milliGRAM(s) Oral daily  cefepime   IVPB 2000 milliGRAM(s) IV Intermittent every 12 hours  cefepime   IVPB      chlorhexidine 2% Cloths 1 Application(s) Topical daily  dextrose 5% + sodium chloride 0.9%. 1000 milliLiter(s) (50 mL/Hr) IV Continuous <Continuous>  enoxaparin Injectable 40 milliGRAM(s) SubCutaneous every 24 hours  methylPREDNISolone sodium succinate Injectable 40 milliGRAM(s) IV Push every 8 hours  morphine ER Tablet 15 milliGRAM(s) Oral daily  pantoprazole  Injectable 40 milliGRAM(s) IV Push daily    MEDICATIONS  (PRN):  albuterol/ipratropium for Nebulization 3 milliLiter(s) Nebulizer every 6 hours PRN Shortness of Breath and/or Wheezing  guaifenesin/dextromethorphan Oral Liquid 10 milliLiter(s) Oral every 4 hours PRN Cough  HYDROmorphone  Injectable 0.25 milliGRAM(s) IV Push every 2 hours PRN Severe Pain (7 - 10)  oxyCODONE    IR 10 milliGRAM(s) Oral every 3 hours PRN Severe Pain (7 - 10)        LABS                                            10.9                  Neurophils% (auto):   89.1   (09-03 @ 05:25):    19.99)-----------(144          Lymphocytes% (auto):  4.1                                           33.4                   Eosinphils% (auto):   0.0      Manual%: Neutrophils x    ; Lymphocytes x    ; Eosinophils x    ; Bands%: x    ; Blasts x                                    136    |  95     |  18                  Calcium: 9.0   / iCa: x      (09-03 @ 05:25)    ----------------------------<  121       Magnesium: 1.8                              4.4     |  33     |  <0.5             Phosphorous: 0.8      TPro  5.9    /  Alb  3.1    /  TBili  0.3    /  DBili  x      /  AST  66     /  ALT  59     /  AlkPhos  166    03 Sep 2023 05:25 TRANSFER NOTE  SDU -> Medical floor    59yo former smoking woman pmhx known lung CA w/ brain mets in active immuno therapy peme/pembro following at [facility] last treatment 8/23, migraines presents w/ family from home w/ acute SOB  Pt was in her usual state of health until approximately two days prior when pt started complaining of productive cough, increased secretions; Per family last chest imaging at [place] showed inflammation vs infection"; This AM daughter called for well check and she was disoriented and complaining of difficulty breathing. Alerted EMS who reported hypoxia on seen and transported to ED  Of note per family last WBC 8/17 6.8  Last immunotherapy 8/23  HCP Daughter Judit Dawson RN works in [place] [facility] w/ bad cell reception can contact on teams  family endorses productive cough, sob, robles,   family denies fever, n/v/d, sick contacts  In the ED, ED /72 HR 88 RR 20 100% NRB T 96.8  WBC 25.34 Hg 12.5 Plt 213 BUN 32 Cr 0.6 BNP 3087 Trop negative   VBG pH7.19 pCO2 112 pO2 41 BiCarb 43  Pt given a duoneb and put on BiPap with improved VBG  VBG 7.23 pCO2 97 pO2 38 BiCarb 41  Pt admitted for further management    SDU Course:  Patient monitored in step down unit. Had one episode of BRBPR on 9/2 (per Hospitalist notes), no further episdoes, H&H stable GI consulted. Patient being weaned off NC. BIPAP qhs and prn. To complete 5 day course of steroids and 7 day course of antibiotics for possible pneumonia coverage. Overall improving. Downgraded to floor 9/3/23.       ASSESSMENT & PLAN:   # acute hypercapnic respiratory failure 2/2 COPD exacerbation high risk due to Lung CA  # r/o ?CAPNA vs ?post obstructive PNA  - cefepime/azithro x 7 days  - steroids x5 days  - obtain sputum culture; rvp negative; procal borderline 0.23    # Lung CA w/ brain mets - stable  - pain adequately controlled  - f/u OP    # s/p BRBPR x 1 episode 9/2  - pt high risk for DVT due to active malignancy, no further episodes resumed Lovenox  - GI eval    #Mildly elevated transaminitis  -monitor for now, if worsening get w/u     For Follow-Up:  -GI eval  -monitor BMs  -monitor CMP  -sputum culture/ complete steroid and antibiotic course      Vital Signs Last 24 Hrs  T(C): 36.2 (03 Sep 2023 07:00), Max: 36.9 (02 Sep 2023 19:00)  T(F): 97.2 (03 Sep 2023 07:00), Max: 98.4 (02 Sep 2023 19:00)  HR: 61 (03 Sep 2023 07:08) (55 - 97)  BP: 153/68 (03 Sep 2023 07:08) (114/55 - 153/68)  BP(mean): 98 (03 Sep 2023 07:08) (78 - 100)  RR: 19 (03 Sep 2023 07:00) (18 - 42)  SpO2: 96% (03 Sep 2023 07:08) (91% - 100%)    Parameters below as of 02 Sep 2023 11:40  Patient On (Oxygen Delivery Method): nasal cannula  O2 Flow (L/min): 5    I&O's Summary    02 Sep 2023 07:01  -  03 Sep 2023 07:00  --------------------------------------------------------  IN: 1305 mL / OUT: 825 mL / NET: 480 mL          MEDICATIONS  (STANDING):  albuterol/ipratropium for Nebulization 3 milliLiter(s) Nebulizer every 4 hours  azithromycin   Tablet 500 milliGRAM(s) Oral daily  cefepime   IVPB 2000 milliGRAM(s) IV Intermittent every 12 hours  cefepime   IVPB      chlorhexidine 2% Cloths 1 Application(s) Topical daily  dextrose 5% + sodium chloride 0.9%. 1000 milliLiter(s) (50 mL/Hr) IV Continuous <Continuous>  enoxaparin Injectable 40 milliGRAM(s) SubCutaneous every 24 hours  methylPREDNISolone sodium succinate Injectable 40 milliGRAM(s) IV Push every 8 hours  morphine ER Tablet 15 milliGRAM(s) Oral daily  pantoprazole  Injectable 40 milliGRAM(s) IV Push daily    MEDICATIONS  (PRN):  albuterol/ipratropium for Nebulization 3 milliLiter(s) Nebulizer every 6 hours PRN Shortness of Breath and/or Wheezing  guaifenesin/dextromethorphan Oral Liquid 10 milliLiter(s) Oral every 4 hours PRN Cough  HYDROmorphone  Injectable 0.25 milliGRAM(s) IV Push every 2 hours PRN Severe Pain (7 - 10)  oxyCODONE    IR 10 milliGRAM(s) Oral every 3 hours PRN Severe Pain (7 - 10)        LABS                                            10.9                  Neurophils% (auto):   89.1   (09-03 @ 05:25):    19.99)-----------(144          Lymphocytes% (auto):  4.1                                           33.4                   Eosinphils% (auto):   0.0      Manual%: Neutrophils x    ; Lymphocytes x    ; Eosinophils x    ; Bands%: x    ; Blasts x                                    136    |  95     |  18                  Calcium: 9.0   / iCa: x      (09-03 @ 05:25)    ----------------------------<  121       Magnesium: 1.8                              4.4     |  33     |  <0.5             Phosphorous: 0.8      TPro  5.9    /  Alb  3.1    /  TBili  0.3    /  DBili  x      /  AST  66     /  ALT  59     /  AlkPhos  166    03 Sep 2023 05:25 TRANSFER NOTE  SDU -> Medical floor    59yo former smoking woman pmhx known lung CA w/ brain mets in active immuno therapy peme/pembro following at [place] last treatment 8/23, migraines presents w/ family from home w/ acute SOB  Pt was in her usual state of health until approximately two days prior when pt started complaining of productive cough, increased secretions; Per family last chest imaging at [place] showed inflammation vs infection"; This AM daughter called for well check and she was disoriented and complaining of difficulty breathing. Alerted EMS who reported hypoxia on seen and transported to ED  Of note per family last WBC 8/17 6.8  Last immunotherapy 8/23  HCP Daughter Judit Dawson RN works in [place] ED w/ bad cell reception can contact on teams  family endorses productive cough, sob, robles,   family denies fever, n/v/d, sick contacts  In the ED, ED /72 HR 88 RR 20 100% NRB T 96.8  WBC 25.34 Hg 12.5 Plt 213 BUN 32 Cr 0.6 BNP 3087 Trop negative   VBG pH7.19 pCO2 112 pO2 41 BiCarb 43  Pt given a duoneb and put on BiPap with improved VBG  VBG 7.23 pCO2 97 pO2 38 BiCarb 41  Pt admitted for further management    SDU Course:  Patient monitored in step down unit. Had one episode of BRBPR on 9/2 (per Hospitalist notes), no further episdoes, H&H stable GI consulted. Patient being weaned off NC. BIPAP qhs and prn. To complete 5 day course of steroids and 7 day course of antibiotics for possible pneumonia coverage. Overall improving. Downgraded to floor 9/3/23.       ASSESSMENT & PLAN:   # acute hypercapnic respiratory failure 2/2 COPD exacerbation high risk due to Lung CA  # r/o ?CAPNA vs ?post obstructive PNA  - cefepime/azithro x 7 days  - steroids x5 days  - obtain sputum culture; rvp negative; procal borderline 0.23    # Lung CA w/ brain mets - stable  - pain adequately controlled  - f/u OP    # s/p BRBPR x 1 episode 9/2  - pt high risk for DVT due to active malignancy, no further episodes resumed Lovenox  - GI eval    #Mildly elevated transaminitis  -monitor for now, if worsening get w/u     For Follow-Up:  -continue wean o2   -sputum culture  -GI eval  -monitor CMP        Vital Signs Last 24 Hrs  T(C): 36.2 (03 Sep 2023 07:00), Max: 36.9 (02 Sep 2023 19:00)  T(F): 97.2 (03 Sep 2023 07:00), Max: 98.4 (02 Sep 2023 19:00)  HR: 61 (03 Sep 2023 07:08) (55 - 97)  BP: 153/68 (03 Sep 2023 07:08) (114/55 - 153/68)  BP(mean): 98 (03 Sep 2023 07:08) (78 - 100)  RR: 19 (03 Sep 2023 07:00) (18 - 42)  SpO2: 96% (03 Sep 2023 07:08) (91% - 100%)    Parameters below as of 02 Sep 2023 11:40  Patient On (Oxygen Delivery Method): nasal cannula  O2 Flow (L/min): 5    I&O's Summary    02 Sep 2023 07:01  -  03 Sep 2023 07:00  --------------------------------------------------------  IN: 1305 mL / OUT: 825 mL / NET: 480 mL          MEDICATIONS  (STANDING):  albuterol/ipratropium for Nebulization 3 milliLiter(s) Nebulizer every 4 hours  azithromycin   Tablet 500 milliGRAM(s) Oral daily  cefepime   IVPB 2000 milliGRAM(s) IV Intermittent every 12 hours  cefepime   IVPB      chlorhexidine 2% Cloths 1 Application(s) Topical daily  dextrose 5% + sodium chloride 0.9%. 1000 milliLiter(s) (50 mL/Hr) IV Continuous <Continuous>  enoxaparin Injectable 40 milliGRAM(s) SubCutaneous every 24 hours  methylPREDNISolone sodium succinate Injectable 40 milliGRAM(s) IV Push every 8 hours  morphine ER Tablet 15 milliGRAM(s) Oral daily  pantoprazole  Injectable 40 milliGRAM(s) IV Push daily    MEDICATIONS  (PRN):  albuterol/ipratropium for Nebulization 3 milliLiter(s) Nebulizer every 6 hours PRN Shortness of Breath and/or Wheezing  guaifenesin/dextromethorphan Oral Liquid 10 milliLiter(s) Oral every 4 hours PRN Cough  HYDROmorphone  Injectable 0.25 milliGRAM(s) IV Push every 2 hours PRN Severe Pain (7 - 10)  oxyCODONE    IR 10 milliGRAM(s) Oral every 3 hours PRN Severe Pain (7 - 10)        LABS                                            10.9                  Neurophils% (auto):   89.1   (09-03 @ 05:25):    19.99)-----------(144          Lymphocytes% (auto):  4.1                                           33.4                   Eosinphils% (auto):   0.0      Manual%: Neutrophils x    ; Lymphocytes x    ; Eosinophils x    ; Bands%: x    ; Blasts x                                    136    |  95     |  18                  Calcium: 9.0   / iCa: x      (09-03 @ 05:25)    ----------------------------<  121       Magnesium: 1.8                              4.4     |  33     |  <0.5             Phosphorous: 0.8      TPro  5.9    /  Alb  3.1    /  TBili  0.3    /  DBili  x      /  AST  66     /  ALT  59     /  AlkPhos  166    03 Sep 2023 05:25 TRANSFER NOTE  SDU -> Medical floor    57yo former smoking woman pmhx known lung CA w/ brain mets in active immuno therapy peme/pembro following at [place] last treatment 8/23, migraines presents w/ family from home w/ acute SOB  Pt was in her usual state of health until approximately two days prior when pt started complaining of productive cough, increased secretions; Per family last chest imaging at [place] showed inflammation vs infection"; This AM daughter called for well check and she was disoriented and complaining of difficulty breathing. Alerted EMS who reported hypoxia on seen and transported to ED  Of note per family last WBC 8/17 6.8  Last immunotherapy 8/23  HCP Daughter Judit Dawson RN works in [place] ED w/ bad cell reception can contact on teams  family endorses productive cough, sob, robles,   family denies fever, n/v/d, sick contacts  In the ED, ED /72 HR 88 RR 20 100% NRB T 96.8  WBC 25.34 Hg 12.5 Plt 213 BUN 32 Cr 0.6 BNP 3087 Trop negative   VBG pH7.19 pCO2 112 pO2 41 BiCarb 43  Pt given a duoneb and put on BiPap with improved VBG  VBG 7.23 pCO2 97 pO2 38 BiCarb 41  Pt admitted for further management    SDU Course:  Patient monitored in step down unit. Had one episode of BRBPR on 9/2 (per Hospitalist notes), no further episdoes, H&H stable GI consulted. Patient being weaned off NC. BIPAP qhs and prn. To complete 5 day course of steroids and 7 day course of antibiotics for possible pneumonia coverage. Overall improving. Downgraded to floor 9/3/23.       ASSESSMENT & PLAN:   # acute hypercapnic respiratory failure 2/2 COPD exacerbation high risk due to Lung CA  # possible community-acquired PNA   - cefepime/azithro x 7 days  - steroids x5 days  - obtain sputum culture; rvp negative; procal borderline 0.23    # Lung CA w/ brain mets - stable  - pain adequately controlled  - f/u OP    # s/p BRBPR x 1 episode 9/2  - pt high risk for DVT due to active malignancy, no further episodes resumed Lovenox  - GI eval    #Mildly elevated transaminitis  -monitor for now, if worsening get w/u     For Follow-Up:  -continue wean o2   -sputum culture  -GI eval  -monitor CMP        Vital Signs Last 24 Hrs  T(C): 36.2 (03 Sep 2023 07:00), Max: 36.9 (02 Sep 2023 19:00)  T(F): 97.2 (03 Sep 2023 07:00), Max: 98.4 (02 Sep 2023 19:00)  HR: 61 (03 Sep 2023 07:08) (55 - 97)  BP: 153/68 (03 Sep 2023 07:08) (114/55 - 153/68)  BP(mean): 98 (03 Sep 2023 07:08) (78 - 100)  RR: 19 (03 Sep 2023 07:00) (18 - 42)  SpO2: 96% (03 Sep 2023 07:08) (91% - 100%)    Parameters below as of 02 Sep 2023 11:40  Patient On (Oxygen Delivery Method): nasal cannula  O2 Flow (L/min): 5    I&O's Summary    02 Sep 2023 07:01  -  03 Sep 2023 07:00  --------------------------------------------------------  IN: 1305 mL / OUT: 825 mL / NET: 480 mL          MEDICATIONS  (STANDING):  albuterol/ipratropium for Nebulization 3 milliLiter(s) Nebulizer every 4 hours  azithromycin   Tablet 500 milliGRAM(s) Oral daily  cefepime   IVPB 2000 milliGRAM(s) IV Intermittent every 12 hours  cefepime   IVPB      chlorhexidine 2% Cloths 1 Application(s) Topical daily  dextrose 5% + sodium chloride 0.9%. 1000 milliLiter(s) (50 mL/Hr) IV Continuous <Continuous>  enoxaparin Injectable 40 milliGRAM(s) SubCutaneous every 24 hours  methylPREDNISolone sodium succinate Injectable 40 milliGRAM(s) IV Push every 8 hours  morphine ER Tablet 15 milliGRAM(s) Oral daily  pantoprazole  Injectable 40 milliGRAM(s) IV Push daily    MEDICATIONS  (PRN):  albuterol/ipratropium for Nebulization 3 milliLiter(s) Nebulizer every 6 hours PRN Shortness of Breath and/or Wheezing  guaifenesin/dextromethorphan Oral Liquid 10 milliLiter(s) Oral every 4 hours PRN Cough  HYDROmorphone  Injectable 0.25 milliGRAM(s) IV Push every 2 hours PRN Severe Pain (7 - 10)  oxyCODONE    IR 10 milliGRAM(s) Oral every 3 hours PRN Severe Pain (7 - 10)        LABS                                            10.9                  Neurophils% (auto):   89.1   (09-03 @ 05:25):    19.99)-----------(144          Lymphocytes% (auto):  4.1                                           33.4                   Eosinphils% (auto):   0.0      Manual%: Neutrophils x    ; Lymphocytes x    ; Eosinophils x    ; Bands%: x    ; Blasts x                                    136    |  95     |  18                  Calcium: 9.0   / iCa: x      (09-03 @ 05:25)    ----------------------------<  121       Magnesium: 1.8                              4.4     |  33     |  <0.5             Phosphorous: 0.8      TPro  5.9    /  Alb  3.1    /  TBili  0.3    /  DBili  x      /  AST  66     /  ALT  59     /  AlkPhos  166    03 Sep 2023 05:25 TRANSFER NOTE  SDU -> Medical floor    57yo former smoking woman pmhx known lung CA w/ brain mets in active immuno therapy peme/pembro following at [place] last treatment 8/23, migraines presents w/ family from home w/ acute SOB  Pt was in her usual state of health until approximately two days prior when pt started complaining of productive cough, increased secretions; Per family last chest imaging at [place] showed inflammation vs infection"; This AM daughter called for well check and she was disoriented and complaining of difficulty breathing. Alerted EMS who reported hypoxia on seen and transported to ED  Of note per family last WBC 8/17 6.8  Last immunotherapy 8/23  HCP Daughter Judit Dawson RN works in [place] [facility] w/ bad cell reception can contact on teams  family endorses productive cough, sob, robles,   family denies fever, n/v/d, sick contacts  In the ED, ED /72 HR 88 RR 20 100% NRB T 96.8  WBC 25.34 Hg 12.5 Plt 213 BUN 32 Cr 0.6 BNP 3087 Trop negative   VBG pH7.19 pCO2 112 pO2 41 BiCarb 43  Pt given a duoneb and put on BiPap with improved VBG  VBG 7.23 pCO2 97 pO2 38 BiCarb 41  Pt admitted for further management    SDU Course:  Patient monitored in step down unit. Had one episode of BRBPR on 9/2 (per Hospitalist notes), no further episdoes, H&H stable GI consulted. Patient being weaned off NC. BIPAP qhs and prn. To complete 5 day course of steroids and 7 day course of antibiotics for possible pneumonia coverage. Overall improving. Downgraded to floor 9/3/23.       ASSESSMENT & PLAN:   # acute hypercapnic respiratory failure 2/2 COPD exacerbation high risk due to Lung CA  # possible community-acquired PNA   - cefepime/azithro x 7 days  - steroids x5 days  - obtain sputum culture; rvp negative; procal borderline 0.23  - does not have bipap at home - will need on dc    # Lung CA w/ brain mets - stable  - pain adequately controlled  - f/u OP    # s/p BRBPR x 1 episode 9/2  - pt high risk for DVT due to active malignancy, no further episodes resumed Lovenox  - GI eval    #Mildly elevated transaminitis  -monitor for now, if worsening get w/u     For Follow-Up:  -continue wean o2   -sputum culture  -GI eval  -monitor CMP        Vital Signs Last 24 Hrs  T(C): 36.2 (03 Sep 2023 07:00), Max: 36.9 (02 Sep 2023 19:00)  T(F): 97.2 (03 Sep 2023 07:00), Max: 98.4 (02 Sep 2023 19:00)  HR: 61 (03 Sep 2023 07:08) (55 - 97)  BP: 153/68 (03 Sep 2023 07:08) (114/55 - 153/68)  BP(mean): 98 (03 Sep 2023 07:08) (78 - 100)  RR: 19 (03 Sep 2023 07:00) (18 - 42)  SpO2: 96% (03 Sep 2023 07:08) (91% - 100%)    Parameters below as of 02 Sep 2023 11:40  Patient On (Oxygen Delivery Method): nasal cannula  O2 Flow (L/min): 5    I&O's Summary    02 Sep 2023 07:01  -  03 Sep 2023 07:00  --------------------------------------------------------  IN: 1305 mL / OUT: 825 mL / NET: 480 mL          MEDICATIONS  (STANDING):  albuterol/ipratropium for Nebulization 3 milliLiter(s) Nebulizer every 4 hours  azithromycin   Tablet 500 milliGRAM(s) Oral daily  cefepime   IVPB 2000 milliGRAM(s) IV Intermittent every 12 hours  cefepime   IVPB      chlorhexidine 2% Cloths 1 Application(s) Topical daily  dextrose 5% + sodium chloride 0.9%. 1000 milliLiter(s) (50 mL/Hr) IV Continuous <Continuous>  enoxaparin Injectable 40 milliGRAM(s) SubCutaneous every 24 hours  methylPREDNISolone sodium succinate Injectable 40 milliGRAM(s) IV Push every 8 hours  morphine ER Tablet 15 milliGRAM(s) Oral daily  pantoprazole  Injectable 40 milliGRAM(s) IV Push daily    MEDICATIONS  (PRN):  albuterol/ipratropium for Nebulization 3 milliLiter(s) Nebulizer every 6 hours PRN Shortness of Breath and/or Wheezing  guaifenesin/dextromethorphan Oral Liquid 10 milliLiter(s) Oral every 4 hours PRN Cough  HYDROmorphone  Injectable 0.25 milliGRAM(s) IV Push every 2 hours PRN Severe Pain (7 - 10)  oxyCODONE    IR 10 milliGRAM(s) Oral every 3 hours PRN Severe Pain (7 - 10)        LABS                                            10.9                  Neurophils% (auto):   89.1   (09-03 @ 05:25):    19.99)-----------(144          Lymphocytes% (auto):  4.1                                           33.4                   Eosinphils% (auto):   0.0      Manual%: Neutrophils x    ; Lymphocytes x    ; Eosinophils x    ; Bands%: x    ; Blasts x                                    136    |  95     |  18                  Calcium: 9.0   / iCa: x      (09-03 @ 05:25)    ----------------------------<  121       Magnesium: 1.8                              4.4     |  33     |  <0.5             Phosphorous: 0.8      TPro  5.9    /  Alb  3.1    /  TBili  0.3    /  DBili  x      /  AST  66     /  ALT  59     /  AlkPhos  166    03 Sep 2023 05:25

## 2023-09-03 NOTE — PROGRESS NOTE ADULT - ASSESSMENT
# acute hypercapnic respiratory failure 2/2 COPD exacerbation high risk due to Lung CA  - cefepime/azithro  - steroids  - improved    # Lung CA w/ brain mets   - f/u OP    BRBPR x 1 episode  - back on lovenox without further bleeding episodes  - awaiting GI eval    d/c planning once seen by GI if no further bleed No

## 2023-09-04 ENCOUNTER — TRANSCRIPTION ENCOUNTER (OUTPATIENT)
Age: 58
End: 2023-09-04

## 2023-09-04 VITALS
DIASTOLIC BLOOD PRESSURE: 78 MMHG | SYSTOLIC BLOOD PRESSURE: 146 MMHG | HEART RATE: 68 BPM | RESPIRATION RATE: 18 BRPM | TEMPERATURE: 98 F

## 2023-09-04 LAB
ALBUMIN SERPL ELPH-MCNC: 3.9 G/DL — SIGNIFICANT CHANGE UP (ref 3.5–5.2)
ALP SERPL-CCNC: 188 U/L — HIGH (ref 30–115)
ALT FLD-CCNC: 123 U/L — HIGH (ref 0–41)
ANION GAP SERPL CALC-SCNC: 18 MMOL/L — HIGH (ref 7–14)
AST SERPL-CCNC: 73 U/L — HIGH (ref 0–41)
BASOPHILS # BLD AUTO: 0.14 K/UL — SIGNIFICANT CHANGE UP (ref 0–0.2)
BASOPHILS NFR BLD AUTO: 0.8 % — SIGNIFICANT CHANGE UP (ref 0–1)
BILIRUB SERPL-MCNC: 0.5 MG/DL — SIGNIFICANT CHANGE UP (ref 0.2–1.2)
BUN SERPL-MCNC: 19 MG/DL — SIGNIFICANT CHANGE UP (ref 10–20)
CALCIUM SERPL-MCNC: 9.3 MG/DL — SIGNIFICANT CHANGE UP (ref 8.4–10.5)
CHLORIDE SERPL-SCNC: 90 MMOL/L — LOW (ref 98–110)
CO2 SERPL-SCNC: 26 MMOL/L — SIGNIFICANT CHANGE UP (ref 17–32)
CREAT SERPL-MCNC: 0.5 MG/DL — LOW (ref 0.7–1.5)
EGFR: 108 ML/MIN/1.73M2 — SIGNIFICANT CHANGE UP
EOSINOPHIL # BLD AUTO: 0 K/UL — SIGNIFICANT CHANGE UP (ref 0–0.7)
EOSINOPHIL NFR BLD AUTO: 0 % — SIGNIFICANT CHANGE UP (ref 0–8)
GLUCOSE SERPL-MCNC: 178 MG/DL — HIGH (ref 70–99)
HCT VFR BLD CALC: 40 % — SIGNIFICANT CHANGE UP (ref 37–47)
HGB BLD-MCNC: 13.2 G/DL — SIGNIFICANT CHANGE UP (ref 12–16)
IMM GRANULOCYTES NFR BLD AUTO: 4.6 % — HIGH (ref 0.1–0.3)
LYMPHOCYTES # BLD AUTO: 0.93 K/UL — LOW (ref 1.2–3.4)
LYMPHOCYTES # BLD AUTO: 5.4 % — LOW (ref 20.5–51.1)
MAGNESIUM SERPL-MCNC: 1.9 MG/DL — SIGNIFICANT CHANGE UP (ref 1.8–2.4)
MCHC RBC-ENTMCNC: 31.3 PG — HIGH (ref 27–31)
MCHC RBC-ENTMCNC: 33 G/DL — SIGNIFICANT CHANGE UP (ref 32–37)
MCV RBC AUTO: 94.8 FL — SIGNIFICANT CHANGE UP (ref 81–99)
MONOCYTES # BLD AUTO: 0.5 K/UL — SIGNIFICANT CHANGE UP (ref 0.1–0.6)
MONOCYTES NFR BLD AUTO: 2.9 % — SIGNIFICANT CHANGE UP (ref 1.7–9.3)
NEUTROPHILS # BLD AUTO: 14.74 K/UL — HIGH (ref 1.4–6.5)
NEUTROPHILS NFR BLD AUTO: 86.3 % — HIGH (ref 42.2–75.2)
NRBC # BLD: 0 /100 WBCS — SIGNIFICANT CHANGE UP (ref 0–0)
PHOSPHATE SERPL-MCNC: 1.4 MG/DL — LOW (ref 2.1–4.9)
PLATELET # BLD AUTO: 149 K/UL — SIGNIFICANT CHANGE UP (ref 130–400)
PMV BLD: 11.1 FL — HIGH (ref 7.4–10.4)
POTASSIUM SERPL-MCNC: 3.9 MMOL/L — SIGNIFICANT CHANGE UP (ref 3.5–5)
POTASSIUM SERPL-SCNC: 3.9 MMOL/L — SIGNIFICANT CHANGE UP (ref 3.5–5)
PROT SERPL-MCNC: 6.6 G/DL — SIGNIFICANT CHANGE UP (ref 6–8)
RBC # BLD: 4.22 M/UL — SIGNIFICANT CHANGE UP (ref 4.2–5.4)
RBC # FLD: 16 % — HIGH (ref 11.5–14.5)
SODIUM SERPL-SCNC: 134 MMOL/L — LOW (ref 135–146)
WBC # BLD: 17.09 K/UL — HIGH (ref 4.8–10.8)
WBC # FLD AUTO: 17.09 K/UL — HIGH (ref 4.8–10.8)

## 2023-09-04 PROCEDURE — 99238 HOSP IP/OBS DSCHRG MGMT 30/<: CPT

## 2023-09-04 RX ORDER — MORPHINE SULFATE 50 MG/1
1 CAPSULE, EXTENDED RELEASE ORAL
Refills: 0 | DISCHARGE

## 2023-09-04 RX ORDER — SODIUM,POTASSIUM PHOSPHATES 278-250MG
1 POWDER IN PACKET (EA) ORAL
Qty: 2 | Refills: 0
Start: 2023-09-04 | End: 2023-09-05

## 2023-09-04 RX ORDER — ALBUTEROL 90 UG/1
2 AEROSOL, METERED ORAL
Qty: 1 | Refills: 0
Start: 2023-09-04 | End: 2023-10-03

## 2023-09-04 RX ORDER — SODIUM,POTASSIUM PHOSPHATES 278-250MG
1 POWDER IN PACKET (EA) ORAL ONCE
Refills: 0 | Status: COMPLETED | OUTPATIENT
Start: 2023-09-04 | End: 2023-09-04

## 2023-09-04 RX ORDER — GABAPENTIN 400 MG/1
1 CAPSULE ORAL
Qty: 0 | Refills: 0 | DISCHARGE

## 2023-09-04 RX ORDER — OXYCODONE HYDROCHLORIDE 5 MG/1
1 TABLET ORAL
Qty: 0 | Refills: 0 | DISCHARGE

## 2023-09-04 RX ADMIN — CEFEPIME 100 MILLIGRAM(S): 1 INJECTION, POWDER, FOR SOLUTION INTRAMUSCULAR; INTRAVENOUS at 05:53

## 2023-09-04 RX ADMIN — OXYCODONE HYDROCHLORIDE 10 MILLIGRAM(S): 5 TABLET ORAL at 07:40

## 2023-09-04 RX ADMIN — OXYCODONE HYDROCHLORIDE 10 MILLIGRAM(S): 5 TABLET ORAL at 06:50

## 2023-09-04 RX ADMIN — Medication 40 MILLIGRAM(S): at 05:53

## 2023-09-04 RX ADMIN — Medication 3 MILLILITER(S): at 07:33

## 2023-09-04 RX ADMIN — MORPHINE SULFATE 15 MILLIGRAM(S): 50 CAPSULE, EXTENDED RELEASE ORAL at 12:17

## 2023-09-04 RX ADMIN — ENOXAPARIN SODIUM 40 MILLIGRAM(S): 100 INJECTION SUBCUTANEOUS at 11:08

## 2023-09-04 RX ADMIN — OXYCODONE HYDROCHLORIDE 10 MILLIGRAM(S): 5 TABLET ORAL at 00:52

## 2023-09-04 RX ADMIN — AZITHROMYCIN 500 MILLIGRAM(S): 500 TABLET, FILM COATED ORAL at 11:07

## 2023-09-04 RX ADMIN — Medication 40 MILLIGRAM(S): at 14:55

## 2023-09-04 RX ADMIN — OXYCODONE HYDROCHLORIDE 10 MILLIGRAM(S): 5 TABLET ORAL at 11:07

## 2023-09-04 RX ADMIN — Medication 3 MILLILITER(S): at 12:18

## 2023-09-04 RX ADMIN — PANTOPRAZOLE SODIUM 40 MILLIGRAM(S): 20 TABLET, DELAYED RELEASE ORAL at 11:08

## 2023-09-04 RX ADMIN — OXYCODONE HYDROCHLORIDE 10 MILLIGRAM(S): 5 TABLET ORAL at 02:20

## 2023-09-04 RX ADMIN — OXYCODONE HYDROCHLORIDE 10 MILLIGRAM(S): 5 TABLET ORAL at 03:59

## 2023-09-04 NOTE — DISCHARGE NOTE PROVIDER - PROVIDER TOKENS
FREE:[LAST:[MSK],PHONE:[(   )    -],FAX:[(   )    -],ADDRESS:[F/U w your oncologist at [facility]]] FREE:[LAST:[MSK],PHONE:[(   )    -],FAX:[(   )    -],ADDRESS:[F/U w your oncologist at [facility] [place] [facility]]],PROVIDER:[TOKEN:[[id number]]] PROVIDER:[TOKEN:[[id number]]],FREE:[LAST:[MSK],PHONE:[(   )    -],FAX:[(   )    -],ADDRESS:[F/U w your oncologist at [facility] [place]]],PROVIDER:[TOKEN:[[id number]]]

## 2023-09-04 NOTE — CONSULT NOTE ADULT - SUBJECTIVE AND OBJECTIVE BOX
HPI:      I discussed the case  with the referring physician.     I reviewed the radiology tests and hospital record including the ED chart.    I reviewed the other consultants comments that are available in the chart.    CC/ HPI Patient is a 58y old  Female who presents with a chief complaint of     Acute respiratory failure, unspecified whether with hypoxia or hypercapnia    MEWS Score    Lower back pain    Mild anemia    Stage 4 lung cancer    Acute respiratory failure with hypoxia and hypercapnia    No significant past surgical history    History of appendectomy    History of tonsillectomy    H/O laparoscopy    SHORTNESS OF BREATH    90+    CO2 narcosis    [email or web address]        FAMILY HISTORY:      No Known Allergies      Soc:  see Hpi.    Home prescriptions  Chantix 1 mg oral tablet: 1 tab(s) orally 2 times a day  morphine 15 mg oral tablet: 1 tab(s) orally every 4 hours, As Needed  Opana 10 mg oral tablet: 20 milligram(s) orally 2 times a day  oxyMORphone 10 mg oral tablet: 1 tab(s) orally every 4 hours, As Needed      MEDICATIONS  (STANDING):    MEDICATIONS  (PRN):          T(C): 36 (09-01-23 @ 11:14), Max: 36 (09-01-23 @ 11:14)  HR: 93 (09-01-23 @ 13:15) (80 - 93)  BP: 165/82 (09-01-23 @ 11:55) (165/72 - 165/82)  RR: 18 (09-01-23 @ 11:55) (18 - 20)  SpO2: 100% (09-01-23 @ 13:15) (100% - 100%)  ICU Vital Signs Last 24 Hrs  T(C): 36 (01 Sep 2023 11:14), Max: 36 (01 Sep 2023 11:14)  T(F): 96.8 (01 Sep 2023 11:14), Max: 96.8 (01 Sep 2023 11:14)  HR: 93 (01 Sep 2023 13:15) (80 - 93)  BP: 165/82 (01 Sep 2023 11:55) (165/72 - 165/82)    RR: 18 (01 Sep 2023 11:55) (18 - 20)  SpO2: 100% (01 Sep 2023 13:15) (100% - 100%)    O2 Parameters below as of 01 Sep 2023 11:55  Patient On (Oxygen Delivery Method): mask, nonrebreather    I&O's Summary      I&O's Detail      Drug Dosing Weight    Weight (kg): 40.8 (01 Sep 2023 11:14)    LABS:                          12.5   25.34 )-----------( 213      ( 01 Sep 2023 11:30 )             39.5       09-01    136  |  92<L>  |  32<H>  ----------------------------<  179<H>  4.3   |  34<H>  |  0.6<L>    Ca    9.2      01 Sep 2023 11:30  Mg     2.1     09-01    TPro  7.1  /  Alb  3.8  /  TBili  0.5  /  DBili  x   /  AST  24  /  ALT  25  /  AlkPhos  172<H>  09-01      LIVER FUNCTIONS - ( 01 Sep 2023 11:30 )  Alb: 3.8 g/dL / Pro: 7.1 g/dL / ALK PHOS: 172 U/L / ALT: 25 U/L / AST: 24 U/L / GGT: x             CARDIAC MARKERS ( 01 Sep 2023 11:30 )  x     / <0.01 ng/mL / x     / x     / x            Urinalysis Basic - ( 01 Sep 2023 11:30 )    Color: x / Appearance: x / SG: x / pH: x  Gluc: 179 mg/dL / Ketone: x  / Bili: x / Urobili: x   Blood: x / Protein: x / Nitrite: x   Leuk Esterase: x / RBC: x / WBC x   Sq Epi: x / Non Sq Epi: x / Bacteria: x        RADIOLOGY:  I have personally reviewed all chest and other pertinent radiology films.         REVIEW OF SYSTEMS:   see Hpi.    PHYSICAL EXAM:       · ENMT:   Airway patent,   Nasal mucosa clear.  Mouth with normal mucosa.   No thrush    · EYES:   Clear bilaterally,   pupils equal,   round and reactive to light.    · CARDIAC:   Normal rate,   regular rhythm.    Heart sounds S1, S2.   no thrills or bruits on palpitation  normal  cardiac impulse  No murmurs, no rubs or gallops on auscultation  no edema        CAROTID:   normal systolic impulse  no bruits    · RESPIRATORY:   wheeze  normal chest expansion  tachypneic,  no retractions or use of accessory muscles  palpation of chest is normal with no fremitus  percussion of chest demonstrates hyperresonance     · GASTROINTESTINAL:  Abdomen soft,   non-tender,   no guarding,   + BS  liver spleen not palpable      · MUSCULOSKELETAL:   range of motion is not limited,  no clubbing, cyanosis  no petechiae      · SKIN:   Skin normal color for race,   warm,   dry and intact.       · HEME LYMPH:   no splenomegaly.  No cervical  lymphadenopathy.  no inguinal lymphadenopathy    
Gastroenterology/Hepatology Consultation    For questions and inquiries please page [phone].  For urgent matters or after 5pm and on weekends please page the fellow on call through the GI paging system.    58y Female with hematochezia  Patient is a 58y old  Female who presents with a chief complaint of SOB. (03 Sep 2023 18:50)    HPI:  57yo former smoking woman pmhx known lung CA w/ brain mets in active immuno therapy peme/pembro following at [place] last treatment 8/23, migraines presents w/ family from home w/ acute SOB    Pt was in her usual state of health until approximately two days prior when pt started complaining of productive cough, increased secretions; Per family last chest imaging at [place] showed inflammation vs infection"; This AM daughter called for well check and she was disoriented and complaining of difficulty breathing. Alerted EMS who reported hypoxia on seen and transported to ED    Of note per family last WBC 8/17 6.8  Last immunotherapy 8/23    HCP Daughter Judit Dawson RN works in [place] ED w/ bad cell reception can contact on teams    family endorses productive cough, sob, robles,   family denies fever, n/v/d, sick contacts    ED  /72 HR 88 RR 20 100% NRB T 96.8  WBC 25.34 Hg 12.5 Plt 213 BUN 32 Cr 0.6 BNP 3087 Trop negative   VBG pH7.19 pCO2 112 pO2 41 BiCarb 43    Pt given a duoneb and put on BiPap with improved VBG    VBG 7.23 pCO2 97 pO2 38 BiCarb 41    Pt admitted to ICU for further management (01 Sep 2023 15:38)    Review of system  General:  (-) weight loss, (-) fevers  Eyes:  (-) visual changes  CV:  (-) chest pain  Resp: (+) SOB, (-) wheezing  GI: (-) abdominal pain,  (-) nausea, (-) vomiting, (-) dysphagia, (-) diarrhea, (-) constipation, (+) rectal bleeding, (-) melena, (-) hematemesis.  Neuro: (-) confusion, (-) weakness  Psych:  (-) Hallucinations  Heme:  (-) easy bruisability    Past medical/surgical Hx:  PAST MEDICAL & SURGICAL HISTORY:  Lower back pain      Mild anemia      Stage 4 lung cancer      History of appendectomy      History of tonsillectomy      H/O laparoscopy        Home Medications:  gabapentin 300 mg oral tablet: 1 tab(s) orally once a day  morphine 15 mg/12 hr oral tablet, extended release: 1 tab(s) orally once a day  oxyCODONE 15 mg oral tablet: 1 tab(s) orally every 4 hours as needed for  pain      Allergies: No Known Allergies      Current Medications:   albuterol/ipratropium for Nebulization 3 milliLiter(s) Nebulizer every 4 hours  albuterol/ipratropium for Nebulization 3 milliLiter(s) Nebulizer every 6 hours PRN  azithromycin   Tablet 500 milliGRAM(s) Oral daily  cefepime   IVPB      cefepime   IVPB 2000 milliGRAM(s) IV Intermittent every 12 hours  chlorhexidine 2% Cloths 1 Application(s) Topical daily  enoxaparin Injectable 40 milliGRAM(s) SubCutaneous every 24 hours  guaifenesin/dextromethorphan Oral Liquid 10 milliLiter(s) Oral every 4 hours PRN  methylPREDNISolone sodium succinate Injectable 40 milliGRAM(s) IV Push every 8 hours  morphine ER Tablet 15 milliGRAM(s) Oral daily  oxyCODONE    IR 10 milliGRAM(s) Oral every 3 hours PRN  pantoprazole  Injectable 40 milliGRAM(s) IV Push daily  polyethylene glycol 3350 17 Gram(s) Oral daily PRN  senna 2 Tablet(s) Oral at bedtime      Physical exam:  T(C): 36.2 (09-04-23 @ 05:22), Max: 36.6 (09-03-23 @ 10:56)  HR: 65 (09-04-23 @ 05:22) (65 - 75)  BP: 146/77 (09-04-23 @ 05:22) (128/91 - 146/77)  RR: 18 (09-04-23 @ 05:22) (18 - 18)  SpO2: 95% (09-03-23 @ 18:22) (91% - 96%)  GENERAL: NAD  HEAD:  Atraumatic, Normocephalic  EYES: Sclera:NL  NECK: Supple, no JVD or thyromegaly  CHEST/LUNG: Good bilateral air entry  HEART: normal S1, S2. Regular  ABDOMEN: (-) distended, (-) tender, (-) rebound, (+) BS, (-)HSM  EXTREMITIES: (-) edema  NEUROLOGY: (-) asterixis  SKIN: (-) jaundice  PARAMJIT: (-) melena () brbpr    Data:                        10.9   19.99 )-----------( 144      ( 03 Sep 2023 05:25 )             33.4     MCV   RDW   HGB trend:  10.9  09-03-23 @ 05:25  12.3  09-02-23 @ 05:27  12.5  09-01-23 @ 11:30        09-03    136  |  95<L>  |  18  ----------------------------<  121<H>  4.4   |  33<H>  |  <0.5<L>    Ca    9.0      03 Sep 2023 05:25  Phos  0.8     09-03  Mg     1.8     09-03    TPro  5.9<L>  /  Alb  3.1<L>  /  TBili  0.3  /  DBili  x   /  AST  66<H>  /  ALT  59<H>  /  AlkPhos  166<H>  09-03    Liver panel trend:  TBili 0.3   /   AST 66   /   ALT 59   /   AlkP 166   /   Tptn 5.9   /   Alb 3.1    /   DBili --      09-03  TBili 0.4   /   AST 26   /   ALT 26   /   AlkP 161   /   Tptn 6.5   /   Alb 3.4    /   DBili --      09-02  TBili 0.5   /   AST 24   /   ALT 25   /   AlkP 172   /   Tptn 7.1   /   Alb 3.8    /   DBili --      09-01            Culture - Sputum (collected 03 Sep 2023 12:00)  Source: .Sputum Sputum  Gram Stain (03 Sep 2023 22:37):    Numerous polymorphonuclear leukocytes per low power field    Rare Squamous epithelial cells per low power field    No organisms seen per oil power field    Culture - Blood (collected 01 Sep 2023 16:50)  Source: .Blood Blood  Preliminary Report (03 Sep 2023 23:02):    No growth at 48 Hours      
Patient is a 58y old  Female who presents with a chief complaint of Acute respiratory distress    HPI:  57yo former smoking woman pmhx known lung CA w/ brain mets on chemo with pemetrexed and pembrolizumab following at [facility]. The last treatment was on 8/23, presented w/ family from home w/ acute SOB    Pt was in her usual state until approximately two days prior when pt started complaining of productive cough, increased secretions; Per family last chest imaging at [place] showed inflammation vs infection"; Her daughter called for well check and she was disoriented and complaining of difficulty breathing. Alerted EMS who reported hypoxia on seen and transported to ED    family endorses productive cough, sob, robles,   family denies fever, n/v/d, sick contacts    ED  /72 HR 88 RR 20 100% NRB T 96.8  WBC 25.34 Hg 12.5 Plt 213 BUN 32 Cr 0.6 BNP 3087 Trop negative   VBG pH7.19 pCO2 112 pO2 41 BiCarb 43    Pt given a duoneb and put on BiPap with improved VBG    VBG 7.23 pCO2 97 pO2 38 BiCarb 41    Pt admitted to ICU for further management (01 Sep 2023 15:38)    CXR showed stable left apical opacity.     She was seen by pulmonary and has been treated for COPD exacerbation. She is feeling better and was just transferred from ICU to regular floor.        ROS: as above       PAST MEDICAL & SURGICAL HISTORY:  Lower back pain      Mild anemia      Stage 4 lung cancer      History of appendectomy      History of tonsillectomy      H/O laparoscopy          SOCIAL HISTORY:    FAMILY HISTORY:      MEDICATIONS  (STANDING):  albuterol/ipratropium for Nebulization 3 milliLiter(s) Nebulizer every 4 hours  azithromycin   Tablet 500 milliGRAM(s) Oral daily  cefepime   IVPB      cefepime   IVPB 2000 milliGRAM(s) IV Intermittent every 12 hours  chlorhexidine 2% Cloths 1 Application(s) Topical daily  enoxaparin Injectable 40 milliGRAM(s) SubCutaneous every 24 hours  methylPREDNISolone sodium succinate Injectable 40 milliGRAM(s) IV Push every 8 hours  morphine ER Tablet 15 milliGRAM(s) Oral daily  pantoprazole  Injectable 40 milliGRAM(s) IV Push daily  senna 2 Tablet(s) Oral at bedtime    MEDICATIONS  (PRN):  albuterol/ipratropium for Nebulization 3 milliLiter(s) Nebulizer every 6 hours PRN Shortness of Breath and/or Wheezing  guaifenesin/dextromethorphan Oral Liquid 10 milliLiter(s) Oral every 4 hours PRN Cough  oxyCODONE    IR 10 milliGRAM(s) Oral every 3 hours PRN Severe Pain (7 - 10)  polyethylene glycol 3350 17 Gram(s) Oral daily PRN Constipation      Allergies    No Known Allergies    Intolerances        Vital Signs Last 24 Hrs  T(C): 35.8 (03 Sep 2023 14:22), Max: 36.9 (02 Sep 2023 19:00)  T(F): 96.4 (03 Sep 2023 14:22), Max: 98.4 (02 Sep 2023 19:00)  HR: 75 (03 Sep 2023 14:22) (55 - 88)  BP: 144/67 (03 Sep 2023 14:22) (115/61 - 153/68)  BP(mean): 94 (03 Sep 2023 09:27) (88 - 100)  RR: 19 (03 Sep 2023 07:00) (18 - 29)  SpO2: 95% (03 Sep 2023 18:22) (91% - 100%)    Parameters below as of 03 Sep 2023 18:22  Patient On (Oxygen Delivery Method): nasal cannula  O2 Flow (L/min): 3      PHYSICAL EXAM  General: adult in NAD  HEENT: clear oropharynx, anicteric sclera.  Neck: supple  CV: S1/S2 RR  Lungs: CTA b/l  Abdomen: soft non-tender non-distended, no hepatosplenomegaly  Ext: no edema  Skin: no rashes and no petechiae  Neuro: alert and oriented X 4, no focal deficits      LABS:                          10.9   19.99 )-----------( 144      ( 03 Sep 2023 05:25 )             33.4         Mean Cell Volume : 96.3 fL  Mean Cell Hemoglobin : 31.4 pg  Mean Cell Hemoglobin Concentration : 32.6 g/dL  Auto Neutrophil # : 17.84 K/uL  Auto Lymphocyte # : 0.81 K/uL  Auto Monocyte # : 0.73 K/uL  Auto Eosinophil # : 0.00 K/uL  Auto Basophil # : 0.09 K/uL  Auto Neutrophil % : 89.1 %  Auto Lymphocyte % : 4.1 %  Auto Monocyte % : 3.7 %  Auto Eosinophil % : 0.0 %  Auto Basophil % : 0.5 %      Serial CBC's  09-03 @ 05:25  Hct-33.4 / Hgb-10.9 / Plat-144 / RBC-3.47 / WBC-19.99  Serial CBC's  09-02 @ 05:27  Hct-38.4 / Hgb-12.3 / Plat-176 / RBC-3.92 / WBC-19.91  Serial CBC's  09-01 @ 11:30  Hct-39.5 / Hgb-12.5 / Plat-213 / RBC-4.03 / WBC-25.34      09-03    136  |  95<L>  |  18  ----------------------------<  121<H>  4.4   |  33<H>  |  <0.5<L>    Ca    9.0      03 Sep 2023 05:25  Phos  0.8     09-03  Mg     1.8     09-03    TPro  5.9<L>  /  Alb  3.1<L>  /  TBili  0.3  /  DBili  x   /  AST  66<H>  /  ALT  59<H>  /  AlkPhos  166<H>  09-03                      RADIOLOGY & ADDITIONAL STUDIES:

## 2023-09-04 NOTE — DISCHARGE NOTE PROVIDER - DETAILS OF MALNUTRITION DIAGNOSIS/DIAGNOSES
This patient has been assessed with a concern for Malnutrition and was treated during this hospitalization for the following Nutrition diagnosis/diagnoses:     -  09/02/2023: Severe protein-calorie malnutrition   -  09/02/2023: Underweight (BMI < 19)

## 2023-09-04 NOTE — DISCHARGE NOTE PROVIDER - ATTENDING DISCHARGE PHYSICAL EXAMINATION:
VITALS:  T(F): 97.5 (09-04-23 @ 14:28), Max: 97.5 (09-04-23 @ 14:28)  HR: 68 (09-04-23 @ 14:28) (65 - 68)  BP: 146/78 (09-04-23 @ 14:28) (128/91 - 146/78)  RR: 18 (09-04-23 @ 14:28) (18 - 18)  SpO2: 97% (09-04-23 @ 11:48) (91% - 97%)        PHYSICAL EXAM:  GENERAL: NAD, well-developed  CHEST/LUNG: CTAB; No wheeze  HEART: RRR; No murmurs, rubs, or gallops  ABDOMEN: Soft, NT/ND; BS present  EXTREMITIES:  No cyanosis, or edema  NEUROLOGY: AAOx3  SKIN: No rashes or lesions VITALS:  T(F): 97.5 (09-04-23 @ 14:28), Max: 97.5 (09-04-23 @ 14:28)  HR: 68 (09-04-23 @ 14:28) (65 - 68)  BP: 146/78 (09-04-23 @ 14:28) (128/91 - 146/78)  RR: 18 (09-04-23 @ 14:28) (18 - 18)  SpO2: 97% (09-04-23 @ 11:48) (91% - 97%)        PHYSICAL EXAM:  GENERAL: NAD  HEAD: Atraumatic, normocephalic  EYES: PERRL, normal conjunctiva  ENT: no external nose or ear deformity, moist mucus membranes  CHEST/LUNG: CTAB; No wheeze  HEART: RRR; No murmurs, rubs, or gallops  ABDOMEN: Soft, NT/ND; BS present  EXTREMITIES:  No cyanosis, or edema  NEUROLOGY: AAOx3  SKIN: No rashes or lesions

## 2023-09-04 NOTE — DISCHARGE NOTE PROVIDER - HOSPITAL COURSE
59 y/o former smoking female with a PMHx of known lung CA w/ brain mets in active immunotherapy (Pemetrexed/Pembrolizumab) following at [facility] [place] [facility] (last treatment 8/23), and migraines presents w/ family from home w/ acute SOB accompanied w/ productive cough. The patient's family endorsed aforementioned onset of symptoms upon ED arrival. CXR showed stable left apical opacity. Procal was low/borderline. BCx showed no growth, sputum culture showed numerous PMN leukocytes. CT Chest Angio and CT Head both were WNL. Pt was admitted for acute chronic COPD w exacerbation and possible pneumonia. She received duonebs q6 standing w/ albuterol rescue, empiric IV ABx (IV Azithro 500mg qd x 3 days started on 09/03/2023 & IV Cefepime 2g q12h started on 09/01/2023), 5-day course of Solumedrol 40 mg q8h, and adequate pain control, and has clinically improved. WBC 25 on admission, and most recent is 17.09 as of 09/04 AM. As per heme/onc consultation, pt will continue to f/u outpatient for her cancer treatment at [facility] [place] and the pt was advised to inform her oncologist regarding this event and monitor the side effects from the treatment since Pembrolizumab may cause pneumonitis. Pt had an episode of self-limiting hematochezia during her hospital stay in which GI was consulted. Per GI, anticoags (Lovenox) was resumed for ppx along with CBC trending and further monitoring for any bleeding. Pt has shown no signs of active bleeding and Hgb has been stable since (most recent 13.2). Pt will f/u w PCP OP.

## 2023-09-04 NOTE — DISCHARGE NOTE PROVIDER - CARE PROVIDER_API CALL
JORGE,   F/U w your oncologist at [facility]  Phone: (   )    -  Fax: (   )    -  Follow Up Time:    JORGE,   F/U w your oncologist at [facility]  Phone: (   )    -  Fax: (   )    -  Follow Up Time:     Blayne Arguelles  Pulmonary Disease  [address] [place] [address], [address] [address]  Phone: [phone]  Fax: [phone]  Follow Up Time:    Blayne Arguelles  Pulmonary Disease  [address] [place] [address], [address] [place] [id number]  Phone: [phone]  Fax: [phone]  Follow Up Time:     MSK,   F/U w your oncologist at [facility]  Phone: (   )    -  Fax: (   )    -  Follow Up Time:     Peggy Allen  Gastroenterology  [address] [place] [address]  Phone: [phone]  Fax: [phone]  Follow Up Time:

## 2023-09-04 NOTE — CONSULT NOTE ADULT - ASSESSMENT
57 yo female has stage IV NSCLC w/ brain mets on chemo with Pemetrexed and Pembrolizumab following at [facility]. The last treatment was on 8/23, admitted for COPD exacerbation. CXR showed stable left apical opacity.   She received nebulizer, ABx and Solumedrol 40 mg q8h, and is improving.     Continue current care. Followup with pulmonary.   Patient will continue her cancer treatment at [facility]. Advised her to inform her oncologist regarding this event and monitor the side effects from the treatment since Pembrolizumab may cause pneumonitis.  
Hematochezia- self limited  PNA  Lung Ca    Rec:  May resume AC for prophylaxis  Monitor for further bleeding  Trend CBC and transfuse  Call Gi for any bleeidng
Impression:  Acute chronic COPD with exacerbation  Impending respiratory failure  pneumonia possible   lung cancer with METS to brain receiving chemo/immuno therapies    SUGGEST:    Check O2 sat on NC, record, continue O2 as necessary to maintain sats > 90%  IV solumedrol  bronchodilator treatments ATC and PRN  NIPPV continual for 24H then PRN as tolerated  bedrest  NPO  GI and DVT prophylaxis  pulmonary toilet  IV abx cefepime  procal  obtain full cultures sputum gm stain CS  check Respiratory viral panel, Strep and Legionella Ag.   Nasal MRSA  Pulmonary toilet  advance mobility as tolerated  Keep SaO2 >92% adjust O2 as needed  DVT/ Gastritis prophylaxis    The patient is critically ill with a probability of deterioration.

## 2023-09-04 NOTE — DISCHARGE NOTE NURSING/CASE MANAGEMENT/SOCIAL WORK - PATIENT PORTAL LINK FT
You can access the FollowMyHealth Patient Portal offered by [facility] by registering at the following website: [email or web address]. By joining [employer]’s FollowMyHealth portal, you will also be able to view your health information using other applications (apps) compatible with our system.

## 2023-09-04 NOTE — DISCHARGE NOTE PROVIDER - NSDCMRMEDTOKEN_GEN_ALL_CORE_FT
gabapentin 300 mg oral tablet: 1 tab(s) orally once a day  morphine 15 mg/12 hr oral tablet, extended release: 1 tab(s) orally once a day  oxyCODONE 15 mg oral tablet: 1 tab(s) orally every 4 hours as needed for  pain   Albuterol (Eqv-Proventil HFA) 90 mcg/inh inhalation aerosol: 2 puff(s) inhaled every 6 hours x 30 days as needed for  shortness of breath and/or wheezing  amoxicillin-clavulanate 875 mg-125 mg oral tablet: 1 tab(s) orally 2 times a day x 4 days  gabapentin 300 mg oral tablet: 1 tab(s) orally 2 times a day  oxyCODONE 15 mg oral tablet: 1 tab(s) orally every 6 hours as needed for  pain  predniSONE 20 mg oral tablet: 2 tab(s) orally once a day x 2 days  sodium/potassium/phosphorus 160 mg-280 mg-250 mg oral powder for reconstitution: 1 packet(s) orally once a day x 2 days

## 2023-09-04 NOTE — DISCHARGE NOTE NURSING/CASE MANAGEMENT/SOCIAL WORK - NSDCPEFALRISK_GEN_ALL_CORE
For information on Fall & Injury Prevention, visit: [email or web address] OR  [email or web address] OR  https://www.cdc.gov/steadi/patient.html

## 2023-09-04 NOTE — DISCHARGE NOTE PROVIDER - NSDCCPCAREPLAN_GEN_ALL_CORE_FT
PRINCIPAL DISCHARGE DIAGNOSIS  Diagnosis: Acute respiratory failure with hypoxia and hypercapnia  Assessment and Plan of Treatment: You were treated for acute chronic COPD w exacerbation and possible pneumonia. CXR showed stable left apical opacity. Procal was low/borderline. BCx showed no growth, sputum culture showed numerous PMN leukocytes. CT Chest Angio and CT Head both were WNL. You received duonebs q6 standing w/ albuterol rescue, empiric IV ABx (IV Azithro 500mg qd x 3 days started on 09/03/2023 & IV Cefepime 2g q12h started on 09/01/2023), 5-day course of Solumedrol 40 mg q8h, and adequate pain control. Significant clinical improvement has been noted. As per heme/onc consultation, pt will continue to f/u outpatient for cancer treatment at [facility] and the pt was advised to inform oncologist regarding this event and monitor the side effects from the treatment since Pembrolizumab may cause pneumonitis. there was one episode of self-limiting hematochezia during your hospital stay in which GI was consulted. Per GI, anticoags (Lovenox) was resumed for ppx along with CBC trending and further monitoring for any bleeding. No signs of active bleeding were shown and Hgb has been stable since (most recent 13.2). to f/u w PCP OP.      SECONDARY DISCHARGE DIAGNOSES  Diagnosis: Transaminitis  Assessment and Plan of Treatment: likely reactive to your chemo medications, f/u with your oncologist    Diagnosis: Hematochezia  Assessment and Plan of Treatment: resolved by GI, hemoglobin stable  f/u with your PCP     PRINCIPAL DISCHARGE DIAGNOSIS  Diagnosis: Acute respiratory failure with hypoxia and hypercapnia  Assessment and Plan of Treatment: You were treated for acute chronic COPD w exacerbation and possible pneumonia. CXR showed stable left apical opacity. Procal was low/borderline. BCx showed no growth, sputum culture showed numerous PMN leukocytes. CT Chest Angio and CT Head both were WNL. You received duonebs q6 standing w/ albuterol rescue, empiric IV ABx (IV Azithro 500mg qd x 3 days started on 09/03/2023 & IV Cefepime 2g q12h started on 09/01/2023), 5-day course of Solumedrol 40 mg q8h, and adequate pain control. Significant clinical improvement has been noted. As per heme/onc consultation, patient will continue to f/u outpatient for cancer treatment at [facility] and the patient was advised to inform oncologist regarding this event and monitor the side effects from the treatment since Pembrolizumab may cause pneumonitis. there was one episode of self-limiting hematochezia during your hospital stay in which GI was consulted. Per GI, anticoags (Lovenox) was resumed for ppx along with CBC trending and further monitoring for any bleeding. No signs of active bleeding were shown and Hgb has been stable since (most recent 13.2). to f/u w PCP OP.  Please take your medications as prescribed and follow up with your PMD and pulmonologist as out-patient.      SECONDARY DISCHARGE DIAGNOSES  Diagnosis: Transaminitis  Assessment and Plan of Treatment: likely reactive to medications, f/u with your oncologist. please follow repeat liver function tests. Out-patient GI follow up.    Diagnosis: Hematochezia  Assessment and Plan of Treatment: Single episode of hematochezia. Resumed Lovenox prophylaxis, no further episode of hematochezia and/or melena.  seen by GI, hemoglobin stable, no acute intervention.  f/u with your PCP and GI.     PRINCIPAL DISCHARGE DIAGNOSIS  Diagnosis: Acute respiratory failure with hypoxia and hypercapnia  Assessment and Plan of Treatment: You were treated for acute chronic COPD w exacerbation and possible pneumonia. CXR showed stable left apical opacity. Procal was low/borderline. BCx showed no growth, sputum culture showed numerous PMN leukocytes. CT Chest Angio and CT Head both were WNL. You received duonebs q6 standing w/ albuterol rescue, empiric IV ABx (IV Azithro 500mg qd x 3 days started on 09/03/2023 & IV Cefepime 2g q12h started on 09/01/2023), 5-day course of Solumedrol 40 mg q8h, and adequate pain control. Significant clinical improvement has been noted. As per heme/onc consultation, patient will continue to f/[place] outpatient [place] [facility] and the patient was advised to inform oncologist regarding this event and monitor the side effects from the treatment since Pembrolizumab may cause pneumonitis. there was one episode of self-limiting hematochezia during your hospital stay in which GI was consulted. Per GI, anticoags (Lovenox) was resumed for ppx along with CBC trending and further monitoring for any bleeding. No signs of active bleeding were shown and Hgb has been stable since (most recent 13.2). to f/u w PCP OP.  Please take your medications as prescribed and follow up with your PMD and pulmonologist as out-patient.      SECONDARY DISCHARGE DIAGNOSES  Diagnosis: Transaminitis  Assessment and Plan of Treatment: likely reactive to medications, f/u with your oncologist. please follow repeat liver function tests. Out-patient GI follow up.    Diagnosis: Hematochezia  Assessment and Plan of Treatment: Single episode of hematochezia. Resumed Lovenox prophylaxis, no further episode of hematochezia and/or melena.  seen by GI, hemoglobin stable, no acute intervention.  f/u with your PCP and GI.    Diagnosis: Hypophosphatemia  Assessment and Plan of Treatment: low phosphorus levels noted on labs, treated with po phosphate supplement. You are being discharged home on po phosphate supplement for 2 days.  Please take your medications as prescribed and check blood work including liver function tests and phosphate level during follow up appointment with your oncologist on 09/11/23.

## 2023-09-04 NOTE — DISCHARGE NOTE NURSING/CASE MANAGEMENT/SOCIAL WORK - NSDCVIVACCINE_GEN_ALL_CORE_FT
Tdap; 10-Mar-2018 13:51; Katerina Mccarthy (RN); [employer]; [id number]; IntraMuscular; Deltoid Left.; 0.5 milliLiter(s); VIS (VIS Published: 09-May-2013, VIS Presented: 10-Mar-2018);

## 2023-09-05 LAB
CULTURE RESULTS: SIGNIFICANT CHANGE UP
SPECIMEN SOURCE: SIGNIFICANT CHANGE UP

## 2023-09-06 LAB
CULTURE RESULTS: SIGNIFICANT CHANGE UP
SPECIMEN SOURCE: SIGNIFICANT CHANGE UP

## 2023-09-12 DIAGNOSIS — J18.9 PNEUMONIA, UNSPECIFIED ORGANISM: ICD-10-CM

## 2023-09-12 DIAGNOSIS — C34.90 MALIGNANT NEOPLASM OF UNSPECIFIED PART OF UNSPECIFIED BRONCHUS OR LUNG: ICD-10-CM

## 2023-09-12 DIAGNOSIS — Z87.891 PERSONAL HISTORY OF NICOTINE DEPENDENCE: ICD-10-CM

## 2023-09-12 DIAGNOSIS — E83.39 OTHER DISORDERS OF PHOSPHORUS METABOLISM: ICD-10-CM

## 2023-09-12 DIAGNOSIS — J96.02 ACUTE RESPIRATORY FAILURE WITH HYPERCAPNIA: ICD-10-CM

## 2023-09-12 DIAGNOSIS — E43 UNSPECIFIED SEVERE PROTEIN-CALORIE MALNUTRITION: ICD-10-CM

## 2023-09-12 DIAGNOSIS — R74.01 ELEVATION OF LEVELS OF LIVER TRANSAMINASE LEVELS: ICD-10-CM

## 2023-09-12 DIAGNOSIS — K92.1 MELENA: ICD-10-CM

## 2023-09-12 DIAGNOSIS — C79.31 SECONDARY MALIGNANT NEOPLASM OF BRAIN: ICD-10-CM

## 2023-09-12 DIAGNOSIS — J96.01 ACUTE RESPIRATORY FAILURE WITH HYPOXIA: ICD-10-CM

## 2023-09-12 DIAGNOSIS — J44.1 CHRONIC OBSTRUCTIVE PULMONARY DISEASE WITH (ACUTE) EXACERBATION: ICD-10-CM

## 2023-09-12 DIAGNOSIS — J44.0 CHRONIC OBSTRUCTIVE PULMONARY DISEASE WITH (ACUTE) LOWER RESPIRATORY INFECTION: ICD-10-CM

## 2023-09-16 ENCOUNTER — EMERGENCY (EMERGENCY)
Facility: HOSPITAL | Age: 58
LOS: 0 days | Discharge: HOME | End: 2023-09-16
Attending: EMERGENCY MEDICINE
Payer: MEDICARE

## 2023-09-16 VITALS
DIASTOLIC BLOOD PRESSURE: 56 MMHG | TEMPERATURE: 98 F | OXYGEN SATURATION: 81 % | SYSTOLIC BLOOD PRESSURE: 105 MMHG | WEIGHT: 95.02 LBS | HEART RATE: 100 BPM | RESPIRATION RATE: 18 BRPM | HEIGHT: 64 IN

## 2023-09-16 VITALS — DIASTOLIC BLOOD PRESSURE: 62 MMHG | SYSTOLIC BLOOD PRESSURE: 112 MMHG | HEART RATE: 83 BPM

## 2023-09-16 DIAGNOSIS — R60.0 LOCALIZED EDEMA: ICD-10-CM

## 2023-09-16 DIAGNOSIS — Z90.49 ACQUIRED ABSENCE OF OTHER SPECIFIED PARTS OF DIGESTIVE TRACT: ICD-10-CM

## 2023-09-16 DIAGNOSIS — J44.1 CHRONIC OBSTRUCTIVE PULMONARY DISEASE WITH (ACUTE) EXACERBATION: ICD-10-CM

## 2023-09-16 DIAGNOSIS — Z98.890 OTHER SPECIFIED POSTPROCEDURAL STATES: Chronic | ICD-10-CM

## 2023-09-16 DIAGNOSIS — Z90.49 ACQUIRED ABSENCE OF OTHER SPECIFIED PARTS OF DIGESTIVE TRACT: Chronic | ICD-10-CM

## 2023-09-16 DIAGNOSIS — R06.02 SHORTNESS OF BREATH: ICD-10-CM

## 2023-09-16 DIAGNOSIS — C34.92 MALIGNANT NEOPLASM OF UNSPECIFIED PART OF LEFT BRONCHUS OR LUNG: ICD-10-CM

## 2023-09-16 DIAGNOSIS — Z86.2 PERSONAL HISTORY OF DISEASES OF THE BLOOD AND BLOOD-FORMING ORGANS AND CERTAIN DISORDERS INVOLVING THE IMMUNE MECHANISM: ICD-10-CM

## 2023-09-16 DIAGNOSIS — Z90.89 ACQUIRED ABSENCE OF OTHER ORGANS: Chronic | ICD-10-CM

## 2023-09-16 DIAGNOSIS — Z90.89 ACQUIRED ABSENCE OF OTHER ORGANS: ICD-10-CM

## 2023-09-16 DIAGNOSIS — Z87.891 PERSONAL HISTORY OF NICOTINE DEPENDENCE: ICD-10-CM

## 2023-09-16 PROBLEM — C34.90 MALIGNANT NEOPLASM OF UNSPECIFIED PART OF UNSPECIFIED BRONCHUS OR LUNG: Chronic | Status: ACTIVE | Noted: 2023-09-01

## 2023-09-16 LAB
ALBUMIN SERPL ELPH-MCNC: 3.9 G/DL — SIGNIFICANT CHANGE UP (ref 3.5–5.2)
ALP SERPL-CCNC: 119 U/L — HIGH (ref 30–115)
ALT FLD-CCNC: 22 U/L — SIGNIFICANT CHANGE UP (ref 0–41)
ANION GAP SERPL CALC-SCNC: 7 MMOL/L — SIGNIFICANT CHANGE UP (ref 7–14)
APTT BLD: 31 SEC — SIGNIFICANT CHANGE UP (ref 27–39.2)
AST SERPL-CCNC: 24 U/L — SIGNIFICANT CHANGE UP (ref 0–41)
BASE EXCESS BLDV CALC-SCNC: 13 MMOL/L — HIGH (ref -2–3)
BASOPHILS # BLD AUTO: 0.02 K/UL — SIGNIFICANT CHANGE UP (ref 0–0.2)
BASOPHILS NFR BLD AUTO: 0.2 % — SIGNIFICANT CHANGE UP (ref 0–1)
BILIRUB SERPL-MCNC: 0.6 MG/DL — SIGNIFICANT CHANGE UP (ref 0.2–1.2)
BUN SERPL-MCNC: 16 MG/DL — SIGNIFICANT CHANGE UP (ref 10–20)
CA-I SERPL-SCNC: 1.19 MMOL/L — SIGNIFICANT CHANGE UP (ref 1.15–1.33)
CALCIUM SERPL-MCNC: 9.1 MG/DL — SIGNIFICANT CHANGE UP (ref 8.4–10.5)
CHLORIDE SERPL-SCNC: 95 MMOL/L — LOW (ref 98–110)
CO2 SERPL-SCNC: 34 MMOL/L — HIGH (ref 17–32)
CREAT SERPL-MCNC: <0.5 MG/DL — LOW (ref 0.7–1.5)
EGFR: 114 ML/MIN/1.73M2 — SIGNIFICANT CHANGE UP
EOSINOPHIL # BLD AUTO: 0.01 K/UL — SIGNIFICANT CHANGE UP (ref 0–0.7)
EOSINOPHIL NFR BLD AUTO: 0.1 % — SIGNIFICANT CHANGE UP (ref 0–8)
GAS PNL BLDV: 134 MMOL/L — LOW (ref 136–145)
GAS PNL BLDV: SIGNIFICANT CHANGE UP
GAS PNL BLDV: SIGNIFICANT CHANGE UP
GLUCOSE SERPL-MCNC: 117 MG/DL — HIGH (ref 70–99)
HCO3 BLDV-SCNC: 41 MMOL/L — HIGH (ref 22–29)
HCT VFR BLD CALC: 34.6 % — LOW (ref 37–47)
HCT VFR BLDA CALC: 35 % — LOW (ref 39–51)
HGB BLD CALC-MCNC: 11.5 G/DL — LOW (ref 12.6–17.4)
HGB BLD-MCNC: 10.8 G/DL — LOW (ref 12–16)
IMM GRANULOCYTES NFR BLD AUTO: 0.3 % — SIGNIFICANT CHANGE UP (ref 0.1–0.3)
INR BLD: 1.07 RATIO — SIGNIFICANT CHANGE UP (ref 0.65–1.3)
LACTATE BLDV-MCNC: 1.6 MMOL/L — SIGNIFICANT CHANGE UP (ref 0.5–2)
LYMPHOCYTES # BLD AUTO: 1 K/UL — LOW (ref 1.2–3.4)
LYMPHOCYTES # BLD AUTO: 8.4 % — LOW (ref 20.5–51.1)
MAGNESIUM SERPL-MCNC: 1.8 MG/DL — SIGNIFICANT CHANGE UP (ref 1.8–2.4)
MCHC RBC-ENTMCNC: 31.2 G/DL — LOW (ref 32–37)
MCHC RBC-ENTMCNC: 31.6 PG — HIGH (ref 27–31)
MCV RBC AUTO: 101.2 FL — HIGH (ref 81–99)
MONOCYTES # BLD AUTO: 0.49 K/UL — SIGNIFICANT CHANGE UP (ref 0.1–0.6)
MONOCYTES NFR BLD AUTO: 4.1 % — SIGNIFICANT CHANGE UP (ref 1.7–9.3)
NEUTROPHILS # BLD AUTO: 10.39 K/UL — HIGH (ref 1.4–6.5)
NEUTROPHILS NFR BLD AUTO: 86.9 % — HIGH (ref 42.2–75.2)
NRBC # BLD: 0 /100 WBCS — SIGNIFICANT CHANGE UP (ref 0–0)
PCO2 BLDV: 69 MMHG — HIGH (ref 39–42)
PH BLDV: 7.38 — SIGNIFICANT CHANGE UP (ref 7.32–7.43)
PLATELET # BLD AUTO: 207 K/UL — SIGNIFICANT CHANGE UP (ref 130–400)
PMV BLD: 9.6 FL — SIGNIFICANT CHANGE UP (ref 7.4–10.4)
PO2 BLDV: 18 MMHG — SIGNIFICANT CHANGE UP
POTASSIUM BLDV-SCNC: 4.4 MMOL/L — SIGNIFICANT CHANGE UP (ref 3.5–5.1)
POTASSIUM SERPL-MCNC: 4.5 MMOL/L — SIGNIFICANT CHANGE UP (ref 3.5–5)
POTASSIUM SERPL-SCNC: 4.5 MMOL/L — SIGNIFICANT CHANGE UP (ref 3.5–5)
PROT SERPL-MCNC: 6.6 G/DL — SIGNIFICANT CHANGE UP (ref 6–8)
PROTHROM AB SERPL-ACNC: 12.2 SEC — SIGNIFICANT CHANGE UP (ref 9.95–12.87)
RBC # BLD: 3.42 M/UL — LOW (ref 4.2–5.4)
RBC # FLD: 17.4 % — HIGH (ref 11.5–14.5)
SAO2 % BLDV: 21.9 % — SIGNIFICANT CHANGE UP
SODIUM SERPL-SCNC: 136 MMOL/L — SIGNIFICANT CHANGE UP (ref 135–146)
TROPONIN T SERPL-MCNC: <0.01 NG/ML — SIGNIFICANT CHANGE UP
WBC # BLD: 11.94 K/UL — HIGH (ref 4.8–10.8)
WBC # FLD AUTO: 11.94 K/UL — HIGH (ref 4.8–10.8)

## 2023-09-16 PROCEDURE — 93970 EXTREMITY STUDY: CPT | Mod: 26

## 2023-09-16 PROCEDURE — 94640 AIRWAY INHALATION TREATMENT: CPT

## 2023-09-16 PROCEDURE — 36415 COLL VENOUS BLD VENIPUNCTURE: CPT

## 2023-09-16 PROCEDURE — 85610 PROTHROMBIN TIME: CPT

## 2023-09-16 PROCEDURE — 84295 ASSAY OF SERUM SODIUM: CPT

## 2023-09-16 PROCEDURE — 99285 EMERGENCY DEPT VISIT HI MDM: CPT | Mod: 25

## 2023-09-16 PROCEDURE — 93010 ELECTROCARDIOGRAM REPORT: CPT

## 2023-09-16 PROCEDURE — 85018 HEMOGLOBIN: CPT

## 2023-09-16 PROCEDURE — 84132 ASSAY OF SERUM POTASSIUM: CPT

## 2023-09-16 PROCEDURE — 82330 ASSAY OF CALCIUM: CPT

## 2023-09-16 PROCEDURE — 83735 ASSAY OF MAGNESIUM: CPT

## 2023-09-16 PROCEDURE — 80053 COMPREHEN METABOLIC PANEL: CPT

## 2023-09-16 PROCEDURE — 85025 COMPLETE CBC W/AUTO DIFF WBC: CPT

## 2023-09-16 PROCEDURE — 93970 EXTREMITY STUDY: CPT

## 2023-09-16 PROCEDURE — 87040 BLOOD CULTURE FOR BACTERIA: CPT

## 2023-09-16 PROCEDURE — 93005 ELECTROCARDIOGRAM TRACING: CPT

## 2023-09-16 PROCEDURE — 82803 BLOOD GASES ANY COMBINATION: CPT

## 2023-09-16 PROCEDURE — 85730 THROMBOPLASTIN TIME PARTIAL: CPT

## 2023-09-16 PROCEDURE — 71045 X-RAY EXAM CHEST 1 VIEW: CPT | Mod: 26

## 2023-09-16 PROCEDURE — 99285 EMERGENCY DEPT VISIT HI MDM: CPT

## 2023-09-16 PROCEDURE — 71045 X-RAY EXAM CHEST 1 VIEW: CPT

## 2023-09-16 PROCEDURE — 96374 THER/PROPH/DIAG INJ IV PUSH: CPT

## 2023-09-16 PROCEDURE — 85014 HEMATOCRIT: CPT

## 2023-09-16 PROCEDURE — 83605 ASSAY OF LACTIC ACID: CPT

## 2023-09-16 PROCEDURE — 84484 ASSAY OF TROPONIN QUANT: CPT

## 2023-09-16 RX ORDER — IPRATROPIUM/ALBUTEROL SULFATE 18-103MCG
3 AEROSOL WITH ADAPTER (GRAM) INHALATION
Refills: 0 | Status: COMPLETED | OUTPATIENT
Start: 2023-09-16 | End: 2023-09-16

## 2023-09-16 RX ADMIN — Medication 3 MILLILITER(S): at 16:23

## 2023-09-16 RX ADMIN — Medication 125 MILLIGRAM(S): at 16:40

## 2023-09-16 RX ADMIN — Medication 3 MILLILITER(S): at 15:59

## 2023-09-16 RX ADMIN — Medication 3 MILLILITER(S): at 17:04

## 2023-09-16 NOTE — ED PROVIDER NOTE - NS ED ATTENDING STATEMENT MOD
This was a shared visit with the SHWETA. I reviewed and verified the documentation and independently performed the documented:

## 2023-09-16 NOTE — ED PROVIDER NOTE - CLINICAL SUMMARY MEDICAL DECISION MAKING FREE TEXT BOX
Pt present with shortness of breath and bilateral rhonchi. Responded to nebulizer treatment and IV steroids. Venous duplex neg for DVT.

## 2023-09-16 NOTE — ED PROVIDER NOTE - OBJECTIVE STATEMENT
Patient c/o SOB , worsening past 2 days, H/o metastatic lung ca,, on immune therapy,  using MDIs without improvement today Patient c/o SOB , worsening past 2 days, H/o metastatic lung ca,, on immune therapy,  using MDIs without improvement today. Was admitted 2 weeks ago for same,  pox at home in 70s today , Follow up with MSK felt patient has pneumonitis from her treatment meds

## 2023-09-16 NOTE — ED PROVIDER NOTE - NSFOLLOWUPINSTRUCTIONS_ED_ALL_ED_FT
Prednisone 50 mg once daily    Augmentin 875 mg twice daily x 10 days.     follow up at [place].    Use your albuterol inhaler every 6 hours.

## 2023-09-16 NOTE — ED ADULT TRIAGE NOTE - CHIEF COMPLAINT QUOTE
Pt states " I have SOB for two weeks. Today it was worse"  Pt pulse ox at home 85%. Pt took one albuterol Tx at home

## 2023-09-16 NOTE — ED PROVIDER NOTE - ATTENDING APP SHARED VISIT CONTRIBUTION OF CARE
Pt has a history of left side lung cancer. S/p chemo and currently being treated with immunotherapy. Pt reports she is short of breath. She was here for the same a few weeks ago and did Ct scan neg for PE. Pt denies chest pain. chronic mild lower extremity edema. + wt loss. Pt goes to [facility] in [place].   On exam bilateral rhonchi and diminished breath sounds, no JVD. S1S2 rrr.

## 2023-09-16 NOTE — ED PROVIDER NOTE - PATIENT PORTAL LINK FT
You can access the FollowMyHealth Patient Portal offered by [facility] by registering at the following website: [email or web address]. By joining [employer]’s FollowMyHealth portal, you will also be able to view your health information using other applications (apps) compatible with our system.

## 2023-09-21 ENCOUNTER — APPOINTMENT (OUTPATIENT)
Dept: PULMONOLOGY | Facility: CLINIC | Age: 58
End: 2023-09-21
Payer: MEDICARE

## 2023-09-21 VITALS
DIASTOLIC BLOOD PRESSURE: 66 MMHG | RESPIRATION RATE: 14 BRPM | SYSTOLIC BLOOD PRESSURE: 126 MMHG | HEART RATE: 89 BPM | BODY MASS INDEX: 16.22 KG/M2 | WEIGHT: 95 LBS | HEIGHT: 64 IN

## 2023-09-21 DIAGNOSIS — J67.9 HYPERSENSITIVITY PNEUMONITIS DUE TO UNSPECIFIED ORGANIC DUST: ICD-10-CM

## 2023-09-21 DIAGNOSIS — Z87.891 PERSONAL HISTORY OF NICOTINE DEPENDENCE: ICD-10-CM

## 2023-09-21 PROCEDURE — 99214 OFFICE O/P EST MOD 30 MIN: CPT

## 2023-09-21 RX ORDER — FLUTICASONE PROPIONATE AND SALMETEROL 250; 50 UG/1; UG/1
250-50 POWDER RESPIRATORY (INHALATION)
Qty: 1 | Refills: 5 | Status: ACTIVE | COMMUNITY
Start: 1900-01-01 | End: 1900-01-01

## 2023-09-22 LAB
CULTURE RESULTS: SIGNIFICANT CHANGE UP
CULTURE RESULTS: SIGNIFICANT CHANGE UP
SPECIMEN SOURCE: SIGNIFICANT CHANGE UP
SPECIMEN SOURCE: SIGNIFICANT CHANGE UP

## 2023-10-03 NOTE — ED ADULT NURSE NOTE - PRO INTERPRETER NEED 2
V/o with read back for pt to receive Flu vaccine today. VIS sheet reviewed and given to pt. Flu vaccine consent form provided and signed by pt. Flu vaccine administered to pt on left deltoid. Patient tolerated well without complications. Consent form sent to scanning. English

## 2023-10-30 RX ORDER — TIOTROPIUM BROMIDE 18 UG/1
18 CAPSULE ORAL; RESPIRATORY (INHALATION) DAILY
Qty: 30 | Refills: 5 | Status: ACTIVE | COMMUNITY
Start: 1900-01-01 | End: 1900-01-01

## 2023-12-04 ENCOUNTER — APPOINTMENT (OUTPATIENT)
Dept: PULMONOLOGY | Facility: CLINIC | Age: 58
End: 2023-12-04
Payer: MEDICARE

## 2023-12-04 VITALS
BODY MASS INDEX: 15.54 KG/M2 | HEIGHT: 64 IN | WEIGHT: 91 LBS | SYSTOLIC BLOOD PRESSURE: 110 MMHG | DIASTOLIC BLOOD PRESSURE: 58 MMHG | HEART RATE: 102 BPM | OXYGEN SATURATION: 88 %

## 2023-12-04 PROCEDURE — 99214 OFFICE O/P EST MOD 30 MIN: CPT

## 2023-12-04 RX ORDER — AZITHROMYCIN 250 MG/1
250 TABLET, FILM COATED ORAL DAILY
Qty: 1 | Refills: 5 | Status: ACTIVE | COMMUNITY
Start: 2023-12-04 | End: 1900-01-01

## 2023-12-13 NOTE — ED ADULT TRIAGE NOTE - SOURCE OF INFORMATION
Detail Level: Detailed Depth Of Biopsy: dermis Was A Bandage Applied: Yes Size Of Lesion In Cm: 1 X Size Of Lesion In Cm: 0 Biopsy Type: H and E Biopsy Method: Dermablade Anesthesia Type: 0.5% lidocaine with 1:200,000 epinephrine and a 1:10 solution of 8.4% sodium bicarbonate Anesthesia Volume In Cc: 0.5 Hemostasis: Aluminum Chloride Patient Wound Care: Vaseline Dressing: bandage Destruction After The Procedure: No Type Of Destruction Used: Curettage Curettage Text: The wound bed was treated with curettage after the biopsy was performed. Cryotherapy Text: The wound bed was treated with cryotherapy after the biopsy was performed. Electrodesiccation Text: The wound bed was treated with electrodesiccation after the biopsy was performed. Electrodesiccation And Curettage Text: The wound bed was treated with electrodesiccation and curettage after the biopsy was performed. Silver Nitrate Text: The wound bed was treated with silver nitrate after the biopsy was performed. Lab: [id number] Lab Facility: [id number] Consent: Written consent was obtained and risks were reviewed including but not limited to scarring, infection, bleeding, scabbing, incomplete removal, nerve damage and allergy to anesthesia. Post-Care Instructions: I reviewed with the patient in detail post-care instructions. Patient is to keep the biopsy site dry overnight, and then apply bacitracin twice daily until healed. Patient may apply hydrogen peroxide soaks to remove any crusting. Notification Instructions: Patient will be notified of biopsy results. However, patient instructed to call the office if not contacted within 2 weeks. Billing Type: Third-Party Bill Information: Selecting Yes will display possible errors in your note based on the variables you have selected. This validation is only offered as a suggestion for you. PLEASE NOTE THAT THE VALIDATION TEXT WILL BE REMOVED WHEN YOU FINALIZE YOUR NOTE. IF YOU WANT TO FAX A PRELIMINARY NOTE YOU WILL NEED TO TOGGLE THIS TO 'NO' IF YOU DO NOT WANT IT IN YOUR FAXED NOTE.

## 2024-03-04 NOTE — ED PROVIDER NOTE - NSCAREINITIATED _GEN_ER
Forrest Bray(Resident)    39 yo W female w/ PMH - chronic alcohol use disorder plus other medical history as noted below.  Pt come to hospital after being discharged about 6 weeks ago for alcohol WD.  Pt is in ER w/ alcohol WD, severe abdominal pains , nausea, vomiting , HAGMA,  w/ elevated: LFT, lipase, bilirubin. Case discussed w/ ICU MD .Plan to adm to ICU stepdown    Pt interviewed, examined and EMR chart reviewed.  Pt admits to drinking  1liter daily x  8  months. Pt has no true sobriety she starts drinking the moment she leaves hospital. Last Drink day of admission   Hx of withdrawal tremors and seizures. Last one years ago.  variable periods of sobriety in the past.  Has been in detox before ___X__yes,   _____No      Pt interviewed, examined and EMR chart reviewed.  Pt admits to drinking 1liter per day since discharge. Last Drink yesterday   Hx of withdrawal seizures last one about 1 year ago, positive tremors NO DTs  variable periods of sobriety in the past.  Has been in detox before __X___yes,   _____No    SOCIAL HISTORY:    REVIEW OF SYSTEMS:    Constitutional: No fever, weight loss or fatigue  ENT:  No difficulty hearing, tinnitus, vertigo; No sinus or throat pain  Neck: No pain or stiffness  Respiratory: No cough, wheezing, chills or hemoptysis  Cardiovascular: No chest pain, palpitations, shortness of breath, dizziness or leg swelling  Gastrointestinal: No abdominal or epigastric pain. No nausea, vomiting or hematemesis; No diarrhea or constipation. No melena or hematochezia.  Neurological: No headaches, memory loss, loss of strength, numbness or tremors  Musculoskeletal: No joint pain or swelling; No muscle, back or extremity pain  Psychiatric: No depression, anxiety, mood swings or difficulty sleeping    MEDICATIONS  (STANDING):  chlorhexidine 2% Cloths 1 Application(s) Topical <User Schedule>  dextrose 10%. 1000 milliLiter(s) (100 mL/Hr) IV Continuous <Continuous>  dextrose 50% Injectable 50 milliLiter(s) IV Push every 15 minutes  escitalopram 10 milliGRAM(s) Oral daily  folic acid 1 milliGRAM(s) Oral daily  gabapentin 100 milliGRAM(s) Oral three times a day  heparin   Injectable 5000 Unit(s) SubCutaneous every 8 hours  insulin regular Infusion 5 Unit(s)/Hr (5 mL/Hr) IV Continuous <Continuous>  lactated ringers Bolus 1000 milliLiter(s) IV Bolus once  lactated ringers. 1000 milliLiter(s) (150 mL/Hr) IV Continuous <Continuous>  LORazepam   Injectable 2 milliGRAM(s) IV Push every 4 hours  LORazepam   Injectable   IV Push   multivitamin/minerals 1 Tablet(s) Oral daily  pantoprazole  Injectable 40 milliGRAM(s) IV Push daily  sodium chloride 0.45% with potassium chloride 20 mEq/L 1000 milliLiter(s) (150 mL/Hr) IV Continuous <Continuous>  thiamine Injectable 100 milliGRAM(s) IntraMuscular daily    MEDICATIONS  (PRN):  LORazepam   Injectable 2 milliGRAM(s) IV Push every 1 hour PRN Symptom-triggered: each CIWA -Ar score 8 or GREATER  ondansetron Injectable 4 milliGRAM(s) IV Push every 4 hours PRN Nausea and/or Vomiting      Vital Signs Last 24 Hrs  T(C): 37.9 (04 Mar 2024 08:00), Max: 37.9 (04 Mar 2024 08:00)  T(F): 100.2 (04 Mar 2024 08:00), Max: 100.2 (04 Mar 2024 08:00)  HR: 129 (04 Mar 2024 08:00) (105 - 129)  BP: 138/83 (04 Mar 2024 08:00) (138/83 - 173/93)  BP(mean): 105 (04 Mar 2024 08:00) (105 - 111)  RR: 31 (04 Mar 2024 08:00) (18 - 31)  SpO2: 99% (04 Mar 2024 08:00) (98% - 100%)    Parameters below as of 04 Mar 2024 08:00  Patient On (Oxygen Delivery Method): room air        PHYSICAL EXAM:    Constitutional: NAD, well-groomed, well-developed  HEENT: PERRLA, EOMI, Normal Hearing,  Neck: No LAD, No JVD  Back: Normal spine flexure, No CVA tenderness  Respiratory: CTAB/L  Cardiovascular: S1 and S2, RRR, no M/G/R  Gastrointestinal: BS+, soft, NT/ND  Extremities: No peripheral edema  Neurological: A/O x 3, no focal deficits    LABS:                        12.0   4.72  )-----------( 116      ( 04 Mar 2024 05:56 )             35.5     03-04    134<L>  |  89<L>  |  9<L>  ----------------------------<  65<L>  4.5   |  10<L>  |  0.7    Ca    8.2<L>      04 Mar 2024 05:56  Phos  2.3     03-04  Mg     1.9     03-04    TPro  7.8  /  Alb  4.3  /  TBili  1.7<H>  /  DBili  1.0<H>  /  AST  207<H>  /  ALT  132<H>  /  AlkPhos  100  03-04    PT/INR - ( 03 Mar 2024 23:37 )   PT: 11.60 sec;   INR: 1.02 ratio         PTT - ( 03 Mar 2024 23:37 )  PTT:29.6 sec  Urinalysis Basic - ( 04 Mar 2024 05:56 )    Color: x / Appearance: x / SG: x / pH: x  Gluc: 65 mg/dL / Ketone: x  / Bili: x / Urobili: x   Blood: x / Protein: x / Nitrite: x   Leuk Esterase: x / RBC: x / WBC x   Sq Epi: x / Non Sq Epi: x / Bacteria: x      Drug Screen Urine:  Alcohol Level  Alcohol, Blood: 273 mg/dL (03-03-24 @ 23:37)        RADIOLOGY & ADDITIONAL STUDIES:

## 2024-03-27 ENCOUNTER — APPOINTMENT (OUTPATIENT)
Dept: PULMONOLOGY | Facility: CLINIC | Age: 59
End: 2024-03-27
Payer: MEDICARE

## 2024-03-27 VITALS
DIASTOLIC BLOOD PRESSURE: 64 MMHG | WEIGHT: 96 LBS | HEART RATE: 80 BPM | OXYGEN SATURATION: 94 % | BODY MASS INDEX: 16.39 KG/M2 | HEIGHT: 64 IN | SYSTOLIC BLOOD PRESSURE: 92 MMHG

## 2024-03-27 DIAGNOSIS — T50.905A OTHER DISORDERS OF LUNG: ICD-10-CM

## 2024-03-27 DIAGNOSIS — J98.4 OTHER DISORDERS OF LUNG: ICD-10-CM

## 2024-03-27 PROCEDURE — G2211 COMPLEX E/M VISIT ADD ON: CPT

## 2024-03-27 PROCEDURE — 99213 OFFICE O/P EST LOW 20 MIN: CPT

## 2024-03-27 NOTE — ASSESSMENT
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 She has lung cancer in the left upper lobe and is [place] [facility] [place] is being followed there closely. She was recently diagnosed with a drug-induced pneumonitis due to Keytruda. She is off prednisone therapy    plan: Stress compliance with inhalers. Renewed today. cont PRN albuterol And ICS/LABA check CXR CT chest follow[place]up [place] [facility] next CAT scan is scheduled for May D/C smoking was discussed with the patient The patient is to continue NC O2 24/7 including during sleep 1-2 liters keep PO 90-92% SAT this was stressed We will continue azithromycin 250 mg once daily  F/U 6 months.  Or sooner if there are ongoing issues None

## 2024-03-27 NOTE — PHYSICAL EXAM
[No Acute Distress] : no acute distress [Normal Oropharynx] : normal oropharynx [Normal Appearance] : normal appearance [No Neck Mass] : no neck mass [Normal Rate/Rhythm] : normal rate/rhythm [Normal S1, S2] : normal s1, s2 [No Murmurs] : no murmurs [No Resp Distress] : no resp distress [No Abnormalities] : no abnormalities [Benign] : benign [Normal Gait] : normal gait [No Cyanosis] : no cyanosis [No Edema] : no edema [FROM] : FROM [Normal Color/ Pigmentation] : normal color/ pigmentation [No Focal Deficits] : no focal deficits [Oriented x3] : oriented x3 [Normal Affect] : normal affect [TextBox_2] : Patient appears chronically ill [TextBox_105] : Clubbing is present [TextBox_68] : Her lungs sound almost silent scattered rhonchi

## 2024-03-27 NOTE — REASON FOR VISIT
[Follow-Up] : a follow-up visit [Lung Cancer] : lung cancer [Nicotine Dependence] : nicotine dependence [Emphysema] : emphysema [TextBox_44] : History of emphysema and lung cancer.  She is being followed at [facility].  Had CAT scan several months ago which shows stability in the left upper lobe mass and a right upper lobe nodule.  There is significant emphysema on the scans.  She is taking her inhalers as written however, she is still smoking.

## 2024-03-27 NOTE — HISTORY OF PRESENT ILLNESS
[TextBox_4] : I reviewed the original evaluation and last notes. I reviewed and interpreted any  OP CXR or CT available in the files I discussed the results today with the patient.

## 2024-05-28 ENCOUNTER — APPOINTMENT (OUTPATIENT)
Dept: CARDIOLOGY | Facility: CLINIC | Age: 59
End: 2024-05-28
Payer: MEDICARE

## 2024-05-28 VITALS — HEIGHT: 64 IN | BODY MASS INDEX: 15.36 KG/M2 | WEIGHT: 90 LBS

## 2024-05-28 VITALS — SYSTOLIC BLOOD PRESSURE: 92 MMHG | HEART RATE: 89 BPM | DIASTOLIC BLOOD PRESSURE: 58 MMHG

## 2024-05-28 VITALS — DIASTOLIC BLOOD PRESSURE: 58 MMHG | SYSTOLIC BLOOD PRESSURE: 102 MMHG

## 2024-05-28 DIAGNOSIS — Z00.00 ENCOUNTER FOR GENERAL ADULT MEDICAL EXAMINATION W/OUT ABNORMAL FINDINGS: ICD-10-CM

## 2024-05-28 DIAGNOSIS — C34.12 MALIGNANT NEOPLASM OF UPPER LOBE, LEFT BRONCHUS OR LUNG: ICD-10-CM

## 2024-05-28 PROCEDURE — 93000 ELECTROCARDIOGRAM COMPLETE: CPT

## 2024-05-28 PROCEDURE — 99204 OFFICE O/P NEW MOD 45 MIN: CPT

## 2024-05-28 RX ORDER — FUROSEMIDE 20 MG/1
20 TABLET ORAL DAILY
Qty: 90 | Refills: 3 | Status: ACTIVE | COMMUNITY
Start: 2024-05-28 | End: 1900-01-01

## 2024-05-28 RX ORDER — MORPHINE SULFATE 15 MG/ML
15 INJECTION, SOLUTION INTRAVENOUS
Refills: 0 | Status: ACTIVE | COMMUNITY

## 2024-05-28 RX ORDER — ONDANSETRON HCL/PF 4 MG/2 ML
2 VIAL (ML) INJECTION
Refills: 0 | Status: ACTIVE | COMMUNITY

## 2024-05-28 RX ORDER — OXYCODONE AND ACETAMINOPHEN 5; 325 MG/1; MG/1
5-325 TABLET ORAL
Refills: 0 | Status: ACTIVE | COMMUNITY

## 2024-05-28 RX ORDER — ALBUTEROL 90 MCG
90 AEROSOL (GRAM) INHALATION
Refills: 0 | Status: ACTIVE | COMMUNITY

## 2024-06-14 LAB
ALBUMIN SERPL ELPH-MCNC: 3.3 G/DL
ALP BLD-CCNC: 516 U/L
ALT SERPL-CCNC: 15 U/L
ANION GAP SERPL CALC-SCNC: 12 MMOL/L
AST SERPL-CCNC: 17 U/L
BILIRUB SERPL-MCNC: 0.3 MG/DL
BUN SERPL-MCNC: 15 MG/DL
CALCIUM SERPL-MCNC: 9 MG/DL
CHLORIDE SERPL-SCNC: 94 MMOL/L
CHOLEST SERPL-MCNC: 124 MG/DL
CO2 SERPL-SCNC: 32 MMOL/L
CREAT SERPL-MCNC: 0.42 MG/DL
EGFR: 113 ML/MIN/1.73M2
ESTIMATED AVERAGE GLUCOSE: 120 MG/DL
GLUCOSE SERPL-MCNC: 55 MG/DL
HBA1C MFR BLD HPLC: 5.8 %
HCT VFR BLD CALC: 42 %
HDLC SERPL-MCNC: 47 MG/DL
HGB BLD-MCNC: 12.2 G/DL
LDLC SERPL CALC-MCNC: 62 MG/DL
MCHC RBC-ENTMCNC: 26.9 PG
MCHC RBC-ENTMCNC: 29 GM/DL
MCV RBC AUTO: 92.7 FL
NONHDLC SERPL-MCNC: 77 MG/DL
NT-PROBNP SERPL-MCNC: 159 PG/ML
PLATELET # BLD AUTO: 275 K/UL
POTASSIUM SERPL-SCNC: 4.5 MMOL/L
PROT SERPL-MCNC: 6.3 G/DL
RBC # BLD: 4.53 M/UL
RBC # FLD: 15.7 %
SODIUM SERPL-SCNC: 138 MMOL/L
TRIGL SERPL-MCNC: 76 MG/DL
TSH SERPL-ACNC: 1.62 UIU/ML
WBC # FLD AUTO: 8.7 K/UL

## 2024-06-14 NOTE — PHYSICAL EXAM
[Normal S1, S2] : normal S1, S2 [Clear Lung Fields] : clear lung fields [Elevated JVD ____cm] : elevated JVD ~Vcm [Edema ___] : edema [unfilled] [de-identified] : RRR [de-identified] : 1+

## 2024-06-14 NOTE — HISTORY OF PRESENT ILLNESS
[FreeTextEntry1] : Pt with emphysema, SMOKER, Lung CA f/u at [facility] [place], jenna mass, on continuous oxygen Edema Hypoalbuminemia, Stage IV lung CA not on chemo.   pulm: [place]  pt for more PET scan, pt has edema in legs and arms, pt unable to compression stockings. pt has worsening sob had echo at [facility] [place] in past: lvef 61%, normal diastolic function, RVE, PASP: 35 pt worsening sob since then and albumin is 3.0 and low.  pt winded and frequent breaks and unable to walk very far.   24:  24: HDL: 47, T, LDL: 62,A1C: 5.8

## 2024-06-14 NOTE — DISCUSSION/SUMMARY
[EKG obtained to assist in diagnosis and management of assessed problem(s)] : EKG obtained to assist in diagnosis and management of assessed problem(s) [FreeTextEntry1] : pt edema probably multifactorial  start furosemide 20 mg daily  if patient is dizzy reduce to once every other day  patient with chemo/radiation in 2021 increase salt as bp on lower side dropped less than 10 with standing  get 2 d echo  get zippered compression stockings.  f/u in 4 weeks  bloodwork

## 2024-06-17 ENCOUNTER — APPOINTMENT (OUTPATIENT)
Dept: CARDIOLOGY | Facility: CLINIC | Age: 59
End: 2024-06-17
Payer: MEDICARE

## 2024-06-17 PROCEDURE — 93306 TTE W/DOPPLER COMPLETE: CPT

## 2024-06-24 ENCOUNTER — APPOINTMENT (OUTPATIENT)
Dept: CARDIOLOGY | Facility: CLINIC | Age: 59
End: 2024-06-24
Payer: MEDICARE

## 2024-06-24 VITALS — DIASTOLIC BLOOD PRESSURE: 60 MMHG | SYSTOLIC BLOOD PRESSURE: 110 MMHG | HEART RATE: 70 BPM

## 2024-06-24 VITALS — WEIGHT: 96 LBS | BODY MASS INDEX: 16.39 KG/M2 | HEIGHT: 64 IN

## 2024-06-24 DIAGNOSIS — R73.03 PREDIABETES.: ICD-10-CM

## 2024-06-24 DIAGNOSIS — R06.02 SHORTNESS OF BREATH: ICD-10-CM

## 2024-06-24 DIAGNOSIS — J43.9 EMPHYSEMA, UNSPECIFIED: ICD-10-CM

## 2024-06-24 DIAGNOSIS — I27.20 PULMONARY HYPERTENSION, UNSPECIFIED: ICD-10-CM

## 2024-06-24 DIAGNOSIS — I95.9 HYPOTENSION, UNSPECIFIED: ICD-10-CM

## 2024-06-24 DIAGNOSIS — F17.200 NICOTINE DEPENDENCE, UNSPECIFIED, UNCOMPLICATED: ICD-10-CM

## 2024-06-24 PROCEDURE — 99214 OFFICE O/P EST MOD 30 MIN: CPT

## 2024-06-24 PROCEDURE — G2211 COMPLEX E/M VISIT ADD ON: CPT

## 2024-06-24 NOTE — PHYSICAL EXAM
[Elevated JVD ____cm] : elevated JVD ~Vcm [Normal S1, S2] : normal S1, S2 [Clear Lung Fields] : clear lung fields [Edema ___] : edema [unfilled] [de-identified] : 1+ [de-identified] : RRR

## 2024-06-24 NOTE — HISTORY OF PRESENT ILLNESS
[FreeTextEntry1] : Pt with prediabetes, emphysema, SMOKER, Lung CA f/u at [facility] [place], jenna mass, on continuous oxygen Edema Hypoalbuminemia, Stage IV lung CA not on chemo. MODERATE PULMONARY HTN   pulm: [place]  pt for more PET scan, pt has edema in legs and arms, pt unable to compression stockings. pt has worsening sob had echo at [facility] [place] in past: lvef 61%, normal diastolic function, RVE, PASP: 35 pt worsening sob since then and albumin is 3.0 and low.  pt winded and frequent breaks and unable to walk very far.   24:  24: HDL: 47, T, LDL: 62, A1C: 5.8, BNP: 159, Bun/cr: 15/0.42 GFR: 113 Alk Phos: 516 CO2: 32  24: EF: 57%, TR farooq: 3.4 m/s, LVOT VTI: 22.2 cm, e' sept; 0.10 m/s, GLS note done. PASP: 50 mmHg, LA normal

## 2024-06-24 NOTE — DISCUSSION/SUMMARY
[FreeTextEntry1] : pt edema probably from lung dz as pulmonary htn and diastolic pressures normal  continue furosemide 20 mg daily  if patient is dizzy reduce to once every other day  patient with chemo/radiation in 2021 increase salt as bp on lower side dropped less than 10 with standing  get zippered compression stockings.  f/u in 6 months  f/u [facility], pulmonary evaluation

## 2024-10-22 ENCOUNTER — APPOINTMENT (OUTPATIENT)
Dept: PULMONOLOGY | Facility: CLINIC | Age: 59
End: 2024-10-22
Payer: MEDICARE

## 2024-10-22 VITALS
SYSTOLIC BLOOD PRESSURE: 92 MMHG | OXYGEN SATURATION: 96 % | DIASTOLIC BLOOD PRESSURE: 64 MMHG | HEART RATE: 74 BPM | WEIGHT: 100 LBS | BODY MASS INDEX: 17.07 KG/M2 | HEIGHT: 64 IN

## 2024-10-22 DIAGNOSIS — J98.4 OTHER DISORDERS OF LUNG: ICD-10-CM

## 2024-10-22 DIAGNOSIS — T50.905A OTHER DISORDERS OF LUNG: ICD-10-CM

## 2024-10-22 DIAGNOSIS — C34.12 MALIGNANT NEOPLASM OF UPPER LOBE, LEFT BRONCHUS OR LUNG: ICD-10-CM

## 2024-10-22 DIAGNOSIS — J43.9 EMPHYSEMA, UNSPECIFIED: ICD-10-CM

## 2024-10-22 DIAGNOSIS — I27.20 PULMONARY HYPERTENSION, UNSPECIFIED: ICD-10-CM

## 2024-10-22 DIAGNOSIS — F17.200 NICOTINE DEPENDENCE, UNSPECIFIED, UNCOMPLICATED: ICD-10-CM

## 2024-10-22 PROCEDURE — G2211 COMPLEX E/M VISIT ADD ON: CPT

## 2024-10-22 PROCEDURE — 99213 OFFICE O/P EST LOW 20 MIN: CPT

## 2024-10-22 RX ORDER — ALBUTEROL SULFATE 90 UG/1
108 (90 BASE) INHALANT RESPIRATORY (INHALATION)
Qty: 1 | Refills: 5 | Status: ACTIVE | COMMUNITY
Start: 2024-10-22 | End: 1900-01-01

## 2024-11-27 ENCOUNTER — APPOINTMENT (OUTPATIENT)
Dept: CARDIOLOGY | Facility: CLINIC | Age: 59
End: 2024-11-27
Payer: MEDICARE

## 2024-11-27 ENCOUNTER — NON-APPOINTMENT (OUTPATIENT)
Age: 59
End: 2024-11-27

## 2024-11-27 VITALS
HEIGHT: 64 IN | HEART RATE: 91 BPM | DIASTOLIC BLOOD PRESSURE: 60 MMHG | BODY MASS INDEX: 16.56 KG/M2 | OXYGEN SATURATION: 97 % | SYSTOLIC BLOOD PRESSURE: 90 MMHG | WEIGHT: 97 LBS

## 2024-11-27 DIAGNOSIS — R73.03 PREDIABETES.: ICD-10-CM

## 2024-11-27 DIAGNOSIS — R06.02 SHORTNESS OF BREATH: ICD-10-CM

## 2024-11-27 DIAGNOSIS — I95.9 HYPOTENSION, UNSPECIFIED: ICD-10-CM

## 2024-11-27 DIAGNOSIS — I27.20 PULMONARY HYPERTENSION, UNSPECIFIED: ICD-10-CM

## 2024-11-27 DIAGNOSIS — J43.9 EMPHYSEMA, UNSPECIFIED: ICD-10-CM

## 2024-11-27 PROCEDURE — 99214 OFFICE O/P EST MOD 30 MIN: CPT

## 2024-11-27 PROCEDURE — G2211 COMPLEX E/M VISIT ADD ON: CPT

## 2024-11-27 RX ORDER — MIDODRINE HYDROCHLORIDE 5 MG/1
5 TABLET ORAL
Qty: 270 | Refills: 3 | Status: ACTIVE | COMMUNITY
Start: 2024-11-27 | End: 1900-01-01

## 2024-12-19 ENCOUNTER — APPOINTMENT (OUTPATIENT)
Dept: CARDIOLOGY | Facility: CLINIC | Age: 59
End: 2024-12-19

## 2024-12-19 DIAGNOSIS — F17.200 NICOTINE DEPENDENCE, UNSPECIFIED, UNCOMPLICATED: ICD-10-CM

## 2024-12-19 DIAGNOSIS — J43.9 EMPHYSEMA, UNSPECIFIED: ICD-10-CM

## 2024-12-27 ENCOUNTER — APPOINTMENT (OUTPATIENT)
Dept: CARDIOLOGY | Facility: CLINIC | Age: 59
End: 2024-12-27
Payer: MEDICARE

## 2024-12-27 VITALS
OXYGEN SATURATION: 97 % | HEART RATE: 97 BPM | BODY MASS INDEX: 17.07 KG/M2 | WEIGHT: 100 LBS | DIASTOLIC BLOOD PRESSURE: 60 MMHG | SYSTOLIC BLOOD PRESSURE: 100 MMHG | HEIGHT: 64 IN

## 2024-12-27 DIAGNOSIS — R73.03 PREDIABETES.: ICD-10-CM

## 2024-12-27 DIAGNOSIS — C34.12 MALIGNANT NEOPLASM OF UPPER LOBE, LEFT BRONCHUS OR LUNG: ICD-10-CM

## 2024-12-27 DIAGNOSIS — I27.20 PULMONARY HYPERTENSION, UNSPECIFIED: ICD-10-CM

## 2024-12-27 DIAGNOSIS — I95.9 HYPOTENSION, UNSPECIFIED: ICD-10-CM

## 2024-12-27 DIAGNOSIS — R06.02 SHORTNESS OF BREATH: ICD-10-CM

## 2024-12-27 PROCEDURE — 99215 OFFICE O/P EST HI 40 MIN: CPT

## 2024-12-27 PROCEDURE — G2211 COMPLEX E/M VISIT ADD ON: CPT

## 2025-01-03 ENCOUNTER — INPATIENT (INPATIENT)
Facility: HOSPITAL | Age: 60
LOS: 3 days | Discharge: HOME | DRG: 204 | End: 2025-01-07
Attending: STUDENT IN AN ORGANIZED HEALTH CARE EDUCATION/TRAINING PROGRAM | Admitting: INTERNAL MEDICINE
Payer: MEDICARE

## 2025-01-03 VITALS
HEART RATE: 82 BPM | RESPIRATION RATE: 18 BRPM | TEMPERATURE: 98 F | DIASTOLIC BLOOD PRESSURE: 77 MMHG | WEIGHT: 110.01 LBS | SYSTOLIC BLOOD PRESSURE: 161 MMHG | OXYGEN SATURATION: 95 %

## 2025-01-03 DIAGNOSIS — Z90.89 ACQUIRED ABSENCE OF OTHER ORGANS: Chronic | ICD-10-CM

## 2025-01-03 DIAGNOSIS — Z98.890 OTHER SPECIFIED POSTPROCEDURAL STATES: Chronic | ICD-10-CM

## 2025-01-03 DIAGNOSIS — Z90.49 ACQUIRED ABSENCE OF OTHER SPECIFIED PARTS OF DIGESTIVE TRACT: Chronic | ICD-10-CM

## 2025-01-03 LAB
ANION GAP SERPL CALC-SCNC: 12 MMOL/L — SIGNIFICANT CHANGE UP (ref 7–14)
APAP SERPL-MCNC: <5 UG/ML — LOW (ref 10–30)
BASOPHILS # BLD AUTO: 0.02 K/UL — SIGNIFICANT CHANGE UP (ref 0–0.2)
BASOPHILS NFR BLD AUTO: 0.1 % — SIGNIFICANT CHANGE UP (ref 0–1)
BUN SERPL-MCNC: 20 MG/DL — SIGNIFICANT CHANGE UP (ref 10–20)
CALCIUM SERPL-MCNC: 8.7 MG/DL — SIGNIFICANT CHANGE UP (ref 8.4–10.5)
CHLORIDE SERPL-SCNC: 101 MMOL/L — SIGNIFICANT CHANGE UP (ref 98–110)
CO2 SERPL-SCNC: 28 MMOL/L — SIGNIFICANT CHANGE UP (ref 17–32)
CREAT SERPL-MCNC: 0.8 MG/DL — SIGNIFICANT CHANGE UP (ref 0.7–1.5)
EGFR: 84 ML/MIN/1.73M2 — SIGNIFICANT CHANGE UP
EOSINOPHIL # BLD AUTO: 0.02 K/UL — SIGNIFICANT CHANGE UP (ref 0–0.7)
EOSINOPHIL NFR BLD AUTO: 0.1 % — SIGNIFICANT CHANGE UP (ref 0–8)
GAS PNL BLDV: SIGNIFICANT CHANGE UP
GLUCOSE SERPL-MCNC: 83 MG/DL — SIGNIFICANT CHANGE UP (ref 70–99)
HCT VFR BLD CALC: 31.6 % — LOW (ref 37–47)
HGB BLD-MCNC: 9.9 G/DL — LOW (ref 12–16)
IMM GRANULOCYTES NFR BLD AUTO: 0.4 % — HIGH (ref 0.1–0.3)
LYMPHOCYTES # BLD AUTO: 0.49 K/UL — LOW (ref 1.2–3.4)
LYMPHOCYTES # BLD AUTO: 3.5 % — LOW (ref 20.5–51.1)
MCHC RBC-ENTMCNC: 26.8 PG — LOW (ref 27–31)
MCHC RBC-ENTMCNC: 31.3 G/DL — LOW (ref 32–37)
MCV RBC AUTO: 85.4 FL — SIGNIFICANT CHANGE UP (ref 81–99)
MONOCYTES # BLD AUTO: 1.09 K/UL — HIGH (ref 0.1–0.6)
MONOCYTES NFR BLD AUTO: 7.7 % — SIGNIFICANT CHANGE UP (ref 1.7–9.3)
NEUTROPHILS # BLD AUTO: 12.52 K/UL — HIGH (ref 1.4–6.5)
NEUTROPHILS NFR BLD AUTO: 88.2 % — HIGH (ref 42.2–75.2)
NRBC # BLD: 0 /100 WBCS — SIGNIFICANT CHANGE UP (ref 0–0)
PLATELET # BLD AUTO: 255 K/UL — SIGNIFICANT CHANGE UP (ref 130–400)
PMV BLD: 9.1 FL — SIGNIFICANT CHANGE UP (ref 7.4–10.4)
POTASSIUM SERPL-MCNC: 4.2 MMOL/L — SIGNIFICANT CHANGE UP (ref 3.5–5)
POTASSIUM SERPL-SCNC: 4.2 MMOL/L — SIGNIFICANT CHANGE UP (ref 3.5–5)
RBC # BLD: 3.7 M/UL — LOW (ref 4.2–5.4)
RBC # FLD: 15.9 % — HIGH (ref 11.5–14.5)
SALICYLATES SERPL-MCNC: <0.3 MG/DL — LOW (ref 4–30)
SODIUM SERPL-SCNC: 141 MMOL/L — SIGNIFICANT CHANGE UP (ref 135–146)
WBC # BLD: 14.2 K/UL — HIGH (ref 4.8–10.8)
WBC # FLD AUTO: 14.2 K/UL — HIGH (ref 4.8–10.8)

## 2025-01-03 PROCEDURE — 99291 CRITICAL CARE FIRST HOUR: CPT

## 2025-01-03 PROCEDURE — 93010 ELECTROCARDIOGRAM REPORT: CPT

## 2025-01-03 PROCEDURE — 70450 CT HEAD/BRAIN W/O DYE: CPT | Mod: 26,MC

## 2025-01-03 PROCEDURE — 71045 X-RAY EXAM CHEST 1 VIEW: CPT | Mod: 26

## 2025-01-03 PROCEDURE — 72170 X-RAY EXAM OF PELVIS: CPT | Mod: 26

## 2025-01-03 RX ORDER — NALOXONE HCL 0.4 MG/ML
0.4 VIAL (ML) INJECTION ONCE
Refills: 0 | Status: COMPLETED | OUTPATIENT
Start: 2025-01-03 | End: 2025-01-03

## 2025-01-03 RX ORDER — SODIUM CHLORIDE 9 MG/ML
1000 INJECTION, SOLUTION INTRAMUSCULAR; INTRAVENOUS; SUBCUTANEOUS ONCE
Refills: 0 | Status: COMPLETED | OUTPATIENT
Start: 2025-01-03 | End: 2025-01-03

## 2025-01-03 RX ORDER — LEVETIRACETAM 100 MG/ML
1000 SOLUTION ORAL ONCE
Refills: 0 | Status: COMPLETED | OUTPATIENT
Start: 2025-01-03 | End: 2025-01-03

## 2025-01-03 RX ORDER — NOREPINEPHRINE BITARTRATE 1 MG/ML
0.05 INJECTION INTRAVENOUS
Qty: 8 | Refills: 0 | Status: DISCONTINUED | OUTPATIENT
Start: 2025-01-03 | End: 2025-01-05

## 2025-01-03 RX ADMIN — LEVETIRACETAM 400 MILLIGRAM(S): 100 SOLUTION ORAL at 23:27

## 2025-01-03 RX ADMIN — Medication 0.4 MILLIGRAM(S): at 21:47

## 2025-01-03 RX ADMIN — SODIUM CHLORIDE 1000 MILLILITER(S): 9 INJECTION, SOLUTION INTRAMUSCULAR; INTRAVENOUS; SUBCUTANEOUS at 22:30

## 2025-01-03 RX ADMIN — NOREPINEPHRINE BITARTRATE 4.68 MICROGRAM(S)/KG/MIN: 1 INJECTION INTRAVENOUS at 23:41

## 2025-01-03 NOTE — ED ADULT TRIAGE NOTE - CHIEF COMPLAINT QUOTE
pt biba for possible overdose. pt received 1 mg narcan in field. pt then given 5mg of valium after seizure like activity.

## 2025-01-04 DIAGNOSIS — R06.89 OTHER ABNORMALITIES OF BREATHING: ICD-10-CM

## 2025-01-04 DIAGNOSIS — G93.40 ENCEPHALOPATHY, UNSPECIFIED: ICD-10-CM

## 2025-01-04 LAB
ALBUMIN SERPL ELPH-MCNC: 3.2 G/DL — LOW (ref 3.5–5.2)
ALP SERPL-CCNC: 369 U/L — HIGH (ref 30–115)
ALT FLD-CCNC: 18 U/L — SIGNIFICANT CHANGE UP (ref 0–41)
ANION GAP SERPL CALC-SCNC: 10 MMOL/L — SIGNIFICANT CHANGE UP (ref 7–14)
APPEARANCE UR: CLEAR — SIGNIFICANT CHANGE UP
AST SERPL-CCNC: 22 U/L — SIGNIFICANT CHANGE UP (ref 0–41)
BASE EXCESS BLDV CALC-SCNC: 6.7 MMOL/L — HIGH (ref -2–3)
BILIRUB DIRECT SERPL-MCNC: <0.2 MG/DL — SIGNIFICANT CHANGE UP (ref 0–0.3)
BILIRUB INDIRECT FLD-MCNC: >0 MG/DL — LOW (ref 0.2–1.2)
BILIRUB SERPL-MCNC: 0.2 MG/DL — SIGNIFICANT CHANGE UP (ref 0.2–1.2)
BILIRUB UR-MCNC: NEGATIVE — SIGNIFICANT CHANGE UP
BUN SERPL-MCNC: 21 MG/DL — HIGH (ref 10–20)
CA-I SERPL-SCNC: 1.1 MMOL/L — LOW (ref 1.15–1.33)
CALCIUM SERPL-MCNC: 8.8 MG/DL — SIGNIFICANT CHANGE UP (ref 8.4–10.5)
CHLORIDE SERPL-SCNC: 101 MMOL/L — SIGNIFICANT CHANGE UP (ref 98–110)
CK SERPL-CCNC: 459 U/L — HIGH (ref 0–225)
CO2 SERPL-SCNC: 29 MMOL/L — SIGNIFICANT CHANGE UP (ref 17–32)
COLOR SPEC: YELLOW — SIGNIFICANT CHANGE UP
CREAT SERPL-MCNC: 0.6 MG/DL — LOW (ref 0.7–1.5)
DIFF PNL FLD: ABNORMAL
EGFR: 103 ML/MIN/1.73M2 — SIGNIFICANT CHANGE UP
ETHANOL SERPL-MCNC: <10 MG/DL — SIGNIFICANT CHANGE UP
GAS PNL BLDV: 137 MMOL/L — SIGNIFICANT CHANGE UP (ref 136–145)
GAS PNL BLDV: SIGNIFICANT CHANGE UP
GLUCOSE SERPL-MCNC: 96 MG/DL — SIGNIFICANT CHANGE UP (ref 70–99)
GLUCOSE UR QL: NEGATIVE MG/DL — SIGNIFICANT CHANGE UP
HCO3 BLDV-SCNC: 35 MMOL/L — HIGH (ref 22–29)
HCT VFR BLD CALC: 34.1 % — LOW (ref 37–47)
HCT VFR BLDA CALC: 32 % — LOW (ref 34.5–46.5)
HGB BLD CALC-MCNC: 10.5 G/DL — LOW (ref 11.7–16.1)
HGB BLD-MCNC: 10.6 G/DL — LOW (ref 12–16)
KETONES UR-MCNC: NEGATIVE MG/DL — SIGNIFICANT CHANGE UP
LACTATE BLDV-MCNC: 0.8 MMOL/L — SIGNIFICANT CHANGE UP (ref 0.5–2)
LACTATE SERPL-SCNC: 1.2 MMOL/L — SIGNIFICANT CHANGE UP (ref 0.7–2)
LEUKOCYTE ESTERASE UR-ACNC: ABNORMAL
MAGNESIUM SERPL-MCNC: 1.9 MG/DL — SIGNIFICANT CHANGE UP (ref 1.8–2.4)
MCHC RBC-ENTMCNC: 26.6 PG — LOW (ref 27–31)
MCHC RBC-ENTMCNC: 31.1 G/DL — LOW (ref 32–37)
MCV RBC AUTO: 85.7 FL — SIGNIFICANT CHANGE UP (ref 81–99)
NITRITE UR-MCNC: NEGATIVE — SIGNIFICANT CHANGE UP
NRBC # BLD: 0 /100 WBCS — SIGNIFICANT CHANGE UP (ref 0–0)
PCO2 BLDV: 73 MMHG — CRITICAL HIGH (ref 39–42)
PH BLDV: 7.29 — LOW (ref 7.32–7.43)
PH UR: 5.5 — SIGNIFICANT CHANGE UP (ref 5–8)
PHOSPHATE SERPL-MCNC: 3.9 MG/DL — SIGNIFICANT CHANGE UP (ref 2.1–4.9)
PLATELET # BLD AUTO: 285 K/UL — SIGNIFICANT CHANGE UP (ref 130–400)
PMV BLD: 9 FL — SIGNIFICANT CHANGE UP (ref 7.4–10.4)
PO2 BLDV: 73 MMHG — HIGH (ref 25–45)
POTASSIUM BLDV-SCNC: 4.2 MMOL/L — SIGNIFICANT CHANGE UP (ref 3.5–5.1)
POTASSIUM SERPL-MCNC: 4.3 MMOL/L — SIGNIFICANT CHANGE UP (ref 3.5–5)
POTASSIUM SERPL-SCNC: 4.3 MMOL/L — SIGNIFICANT CHANGE UP (ref 3.5–5)
PROT SERPL-MCNC: 6 G/DL — SIGNIFICANT CHANGE UP (ref 6–8)
PROT UR-MCNC: SIGNIFICANT CHANGE UP MG/DL
RBC # BLD: 3.98 M/UL — LOW (ref 4.2–5.4)
RBC # FLD: 16 % — HIGH (ref 11.5–14.5)
SAO2 % BLDV: 94.2 % — HIGH (ref 67–88)
SODIUM SERPL-SCNC: 140 MMOL/L — SIGNIFICANT CHANGE UP (ref 135–146)
SP GR SPEC: 1.02 — SIGNIFICANT CHANGE UP (ref 1–1.03)
UROBILINOGEN FLD QL: 0.2 MG/DL — SIGNIFICANT CHANGE UP (ref 0.2–1)
WBC # BLD: 8.8 K/UL — SIGNIFICANT CHANGE UP (ref 4.8–10.8)
WBC # FLD AUTO: 8.8 K/UL — SIGNIFICANT CHANGE UP (ref 4.8–10.8)

## 2025-01-04 PROCEDURE — 83735 ASSAY OF MAGNESIUM: CPT

## 2025-01-04 PROCEDURE — 93005 ELECTROCARDIOGRAM TRACING: CPT

## 2025-01-04 PROCEDURE — 84443 ASSAY THYROID STIM HORMONE: CPT

## 2025-01-04 PROCEDURE — 70553 MRI BRAIN STEM W/O & W/DYE: CPT | Mod: MC

## 2025-01-04 PROCEDURE — 85027 COMPLETE CBC AUTOMATED: CPT

## 2025-01-04 PROCEDURE — 84100 ASSAY OF PHOSPHORUS: CPT

## 2025-01-04 PROCEDURE — 85014 HEMATOCRIT: CPT

## 2025-01-04 PROCEDURE — 84295 ASSAY OF SERUM SODIUM: CPT

## 2025-01-04 PROCEDURE — 99291 CRITICAL CARE FIRST HOUR: CPT

## 2025-01-04 PROCEDURE — 80048 BASIC METABOLIC PNL TOTAL CA: CPT

## 2025-01-04 PROCEDURE — 36415 COLL VENOUS BLD VENIPUNCTURE: CPT

## 2025-01-04 PROCEDURE — ZZZZZ: CPT

## 2025-01-04 PROCEDURE — 85025 COMPLETE CBC W/AUTO DIFF WBC: CPT

## 2025-01-04 PROCEDURE — 82330 ASSAY OF CALCIUM: CPT

## 2025-01-04 PROCEDURE — 94640 AIRWAY INHALATION TREATMENT: CPT

## 2025-01-04 PROCEDURE — 82803 BLOOD GASES ANY COMBINATION: CPT

## 2025-01-04 PROCEDURE — 82550 ASSAY OF CK (CPK): CPT

## 2025-01-04 PROCEDURE — 80061 LIPID PANEL: CPT

## 2025-01-04 PROCEDURE — 93306 TTE W/DOPPLER COMPLETE: CPT

## 2025-01-04 PROCEDURE — 70553 MRI BRAIN STEM W/O & W/DYE: CPT | Mod: 26

## 2025-01-04 PROCEDURE — 80053 COMPREHEN METABOLIC PANEL: CPT

## 2025-01-04 PROCEDURE — 81001 URINALYSIS AUTO W/SCOPE: CPT

## 2025-01-04 PROCEDURE — 85018 HEMOGLOBIN: CPT

## 2025-01-04 PROCEDURE — 84484 ASSAY OF TROPONIN QUANT: CPT

## 2025-01-04 PROCEDURE — 84132 ASSAY OF SERUM POTASSIUM: CPT

## 2025-01-04 PROCEDURE — 99233 SBSQ HOSP IP/OBS HIGH 50: CPT

## 2025-01-04 PROCEDURE — A9579: CPT

## 2025-01-04 PROCEDURE — 95819 EEG AWAKE AND ASLEEP: CPT

## 2025-01-04 PROCEDURE — 83605 ASSAY OF LACTIC ACID: CPT

## 2025-01-04 PROCEDURE — 83036 HEMOGLOBIN GLYCOSYLATED A1C: CPT

## 2025-01-04 PROCEDURE — 84146 ASSAY OF PROLACTIN: CPT

## 2025-01-04 PROCEDURE — 99222 1ST HOSP IP/OBS MODERATE 55: CPT

## 2025-01-04 RX ORDER — HEPARIN SODIUM 1000 [USP'U]/ML
5000 INJECTION, SOLUTION INTRAVENOUS; SUBCUTANEOUS EVERY 12 HOURS
Refills: 0 | Status: DISCONTINUED | OUTPATIENT
Start: 2025-01-04 | End: 2025-01-07

## 2025-01-04 RX ORDER — OXYCODONE HCL 15 MG
20 TABLET ORAL EVERY 6 HOURS
Refills: 0 | Status: DISCONTINUED | OUTPATIENT
Start: 2025-01-04 | End: 2025-01-07

## 2025-01-04 RX ORDER — PANTOPRAZOLE 40 MG/1
40 TABLET, DELAYED RELEASE ORAL DAILY
Refills: 0 | Status: DISCONTINUED | OUTPATIENT
Start: 2025-01-04 | End: 2025-01-04

## 2025-01-04 RX ORDER — MORPHINE SULFATE 15 MG
15 TABLET, EXTENDED RELEASE ORAL EVERY 12 HOURS
Refills: 0 | Status: DISCONTINUED | OUTPATIENT
Start: 2025-01-04 | End: 2025-01-06

## 2025-01-04 RX ORDER — ONDANSETRON 4 MG/1
4 TABLET ORAL ONCE
Refills: 0 | Status: COMPLETED | OUTPATIENT
Start: 2025-01-04 | End: 2025-01-04

## 2025-01-04 RX ORDER — ACETAMINOPHEN 80 MG/.8ML
975 SOLUTION/ DROPS ORAL EVERY 8 HOURS
Refills: 0 | Status: DISCONTINUED | OUTPATIENT
Start: 2025-01-04 | End: 2025-01-07

## 2025-01-04 RX ORDER — MIDODRINE HYDROCHLORIDE 5 MG/1
10 TABLET ORAL EVERY 8 HOURS
Refills: 0 | Status: COMPLETED | OUTPATIENT
Start: 2025-01-04 | End: 2025-01-05

## 2025-01-04 RX ORDER — CHLORHEXIDINE GLUCONATE 1.2 MG/ML
1 RINSE ORAL
Refills: 0 | Status: DISCONTINUED | OUTPATIENT
Start: 2025-01-04 | End: 2025-01-07

## 2025-01-04 RX ORDER — IPRATROPIUM BROMIDE AND ALBUTEROL SULFATE .5; 2.5 MG/3ML; MG/3ML
3 SOLUTION RESPIRATORY (INHALATION) EVERY 6 HOURS
Refills: 0 | Status: DISCONTINUED | OUTPATIENT
Start: 2025-01-04 | End: 2025-01-07

## 2025-01-04 RX ADMIN — ONDANSETRON 4 MILLIGRAM(S): 4 TABLET ORAL at 06:35

## 2025-01-04 RX ADMIN — Medication 15 MILLIGRAM(S): at 21:00

## 2025-01-04 RX ADMIN — ACETAMINOPHEN 975 MILLIGRAM(S): 80 SOLUTION/ DROPS ORAL at 22:20

## 2025-01-04 RX ADMIN — Medication 20 MILLIGRAM(S): at 17:44

## 2025-01-04 RX ADMIN — Medication 20 MILLIGRAM(S): at 16:44

## 2025-01-04 RX ADMIN — IPRATROPIUM BROMIDE AND ALBUTEROL SULFATE 3 MILLILITER(S): .5; 2.5 SOLUTION RESPIRATORY (INHALATION) at 20:07

## 2025-01-04 RX ADMIN — CHLORHEXIDINE GLUCONATE 1 APPLICATION(S): 1.2 RINSE ORAL at 06:25

## 2025-01-04 RX ADMIN — Medication 15 MILLIGRAM(S): at 19:59

## 2025-01-04 RX ADMIN — HEPARIN SODIUM 5000 UNIT(S): 1000 INJECTION, SOLUTION INTRAVENOUS; SUBCUTANEOUS at 18:53

## 2025-01-04 RX ADMIN — IPRATROPIUM BROMIDE AND ALBUTEROL SULFATE 3 MILLILITER(S): .5; 2.5 SOLUTION RESPIRATORY (INHALATION) at 07:29

## 2025-01-04 RX ADMIN — MIDODRINE HYDROCHLORIDE 10 MILLIGRAM(S): 5 TABLET ORAL at 21:20

## 2025-01-04 RX ADMIN — HEPARIN SODIUM 5000 UNIT(S): 1000 INJECTION, SOLUTION INTRAVENOUS; SUBCUTANEOUS at 06:25

## 2025-01-04 RX ADMIN — ACETAMINOPHEN 975 MILLIGRAM(S): 80 SOLUTION/ DROPS ORAL at 12:24

## 2025-01-04 RX ADMIN — IPRATROPIUM BROMIDE AND ALBUTEROL SULFATE 3 MILLILITER(S): .5; 2.5 SOLUTION RESPIRATORY (INHALATION) at 14:29

## 2025-01-04 RX ADMIN — ACETAMINOPHEN 975 MILLIGRAM(S): 80 SOLUTION/ DROPS ORAL at 21:25

## 2025-01-04 RX ADMIN — MIDODRINE HYDROCHLORIDE 10 MILLIGRAM(S): 5 TABLET ORAL at 12:34

## 2025-01-04 RX ADMIN — NOREPINEPHRINE BITARTRATE 4.68 MICROGRAM(S)/KG/MIN: 1 INJECTION INTRAVENOUS at 06:26

## 2025-01-04 RX ADMIN — ACETAMINOPHEN 975 MILLIGRAM(S): 80 SOLUTION/ DROPS ORAL at 13:24

## 2025-01-04 RX ADMIN — Medication 20 MILLIGRAM(S): at 21:20

## 2025-01-04 RX ADMIN — Medication 20 MILLIGRAM(S): at 22:20

## 2025-01-04 NOTE — ED PROVIDER NOTE - CLINICAL SUMMARY MEDICAL DECISION MAKING FREE TEXT BOX
59-year-old female, history of lung cancer with brain mets, on morphine and oxycodone, hypertension, found to floor unresponsive.  Given Narcan by EMS, also given Valium for witnessed seizure.  Labs noted for WBC 14.2, hemoglobin 9.9, BUN 20, creatinine 0.8, lactate 2.6, venous pH 7.25.  Chest x-ray shows mass left upper lobe.  EKG normal sinus rhythm no acute changes.  CT head no bleed.  Given IV fluids, Narcan, peripheral Levophed and Keppra.  Will admit to stepdown.

## 2025-01-04 NOTE — ED ADULT NURSE NOTE - NSFALLHARMRISKINTERV_ED_ALL_ED
Assistance OOB with selected safe patient handling equipment if applicable/Assistance with ambulation/Communicate risk of Fall with Harm to all staff, patient, and family/Monitor gait and stability/Monitor for mental status changes and reorient to person, place, and time, as needed/Provide visual cue: red socks, yellow wristband, yellow gown, etc/Reinforce activity limits and safety measures with patient and family/Toileting schedule using arm’s reach rule for commode and bathroom/Use of alarms - bed, stretcher, chair and/or video monitoring/Bed in lowest position, wheels locked, appropriate side rails in place/Call bell, personal items and telephone in reach/Instruct patient to call for assistance before getting out of bed/chair/stretcher/Non-slip footwear applied when patient is off stretcher/[place] to call system/Physically safe environment - no spills, clutter or unnecessary equipment/Purposeful Proactive Rounding/Room/bathroom lighting operational, light cord in reach

## 2025-01-04 NOTE — ED PROVIDER NOTE - OBJECTIVE STATEMENT
59-year-old female PMH lung cancer with mets to the brain, GERD, HTN, COPD brought in by EMS found down at home by family.  Family reporting her last known well was 5 PM when she was outside in front of her home however when they got home around 9 PM she was on the floor and not responding.  EMS arrived and patient was given Narcan due to concern for an overdose, patient takes morphine and oxycodone for pain control at home.  While with EMS patient had a seizure was given Valium at that time.  As per family patient was not wearing her oxygen at home.

## 2025-01-04 NOTE — ED PROVIDER NOTE - CARE PLAN
Principal Discharge DX:	Hypercapnia  Secondary Diagnosis:	Seizure  Secondary Diagnosis:	AMS (altered mental status)   1

## 2025-01-04 NOTE — ED PROVIDER NOTE - PHYSICAL EXAMINATION
GENERAL: frail female, unresponsive  HEAD: No visible or palpable bumps or hematomas. No ecchymosis behind ears B/L.  Eyes: pinpoint pupils.  CVS: No reproducible chest wall tenderness. Normal S1,S2. No murmurs appreciated on auscultation   RESP: No use of accessory muscles. Chest rise symmetrical with good expansion. Lungs clear to auscultation B/L. No wheezing, rales, or rhonchi auscultated.  GI: Normal auscultation of bowel sounds in all 4 quadrants. Soft, Nontender, Nondistended. No guarding or rebound tenderness.   MSK: No visible signs of trauma such as ecchymosis, erythema, or swelling.   Skin: Warm, Dry. No rashes or lesions. Good cap refill < 2 sec B/L.  EXT: Radial and pedal pulses present B/L. No calf tenderness or swelling B/L. No palpable cords. No pedal edema B/L.  Neuro: responsive to painful stimuli. no focal deficit.

## 2025-01-04 NOTE — ED ADULT NURSE NOTE - ISOLATION TYPE:
Writer accompanied patient to the unit 4 conference room for telepsychiatry session with Dr. Mart.  Joanna Cedillo, GRETCHENW   None

## 2025-01-04 NOTE — H&P ADULT - NSHPPHYSICALEXAM_GEN_ALL_CORE
T(C): 35.9 (01-04-25 @ 07:31), Max: 36.8 (01-03-25 @ 21:16)  HR: 72 (01-04-25 @ 07:10) (48 - 82)  BP: 77/48 (01-04-25 @ 07:10) (69/43 - 162/77)  RR: 15 (01-04-25 @ 07:31) (9 - 27)  SpO2: 99% (01-04-25 @ 07:31) (91% - 100%)    CONSTITUTIONAL: no apparent distress  EYES:, No conjunctival or scleral injection, non-icteric  ENMT: Oral mucosa with moist membranes  RESP: No respiratory distress, no use of accessory muscles; CTA b/l, no WRR  CV: RRR, +S1S2, no MRG; no JVD; no peripheral edema  GI: Soft  LYMPH: No cervical LAD or tenderness  MSK:  No digital clubbing or cyanosis,   SKIN: No rashes or ulcers noted  NEURO Somnolent, not responsive to tactile stimulation

## 2025-01-04 NOTE — H&P ADULT - HISTORY OF PRESENT ILLNESS
Pt is a 58 yo female PMH lung cancer with mets to the brain, HTN, COPD who presents with complaint of AMS at which time the pt found the pt down and hypoxic prompting EMS contact. Upon ED presentation, the pt's family states that she had a similar episode about a month ago where she required BiPAP and vasopressor support. Additionally, the pt required initiation of low dose vasopressors upon ED presentation. Of note, during transportation to the ED, the pt had a witnessed seizre where EMS gave her 5mg of Valium. This was after the pt received Narcan upon EMS presentation at the pt's home. Pt did receive an additional dose of Valium upon ED presentation but pt had a minimal response. Upon critical care evaluation the pt is somnolent and not responding to tactile stimuli but is noted to be protecting her airway. No other complaints or concerns noted at this time.

## 2025-01-04 NOTE — PATIENT PROFILE ADULT - FALL HARM RISK - HARM RISK INTERVENTIONS

## 2025-01-04 NOTE — H&P ADULT - PROBLEM SELECTOR PLAN 1
- During ICU admission, pt is now awake and interactive and answering all questions appropriately  - AMS most likely secondary to hypoxia as well as administration of Valium  - Continue gentle IVF hydration  - Avoid sedative medications including valium  - New onset witnessed seizure as per EMS s/p receipt of Narcan   - Obtain lactate level, CK, and Prolactin level (more sensitive is determining true seizure activity)  - Neurology consult  - NPO for now and initiate diet as indicated  - Restart home regimen of pain medication  - Follow up Utox, Alcohol level, and Tylenol level  - Duonebs q6 hours daily and transition to home inhaler regimen in the AM  - PT evaluation  - OOB to chair as tolerated  - Daily incentive spirometer use - During ICU admission, pt is now awake and interactive and answering all questions appropriately  - AMS most likely secondary to hypoxia as well as administration of Valium  - Continue gentle IVF hydration  - Avoid sedative medications including valium  - New onset witnessed seizure as per EMS s/p receipt of Narcan   - Obtain lactate level, CK, and Prolactin level (more sensitive is determining true seizure activity)  - Neurology consult  - NPO for now and initiate diet as indicated  - Restart home regimen of pain medication   - Restart Midodrine 10mg q8 hours daily  - Titrate off pressors to maintain a SBP of 90 of greater (pt's normal SBP is in the 90s)  - Follow up Utox, Alcohol level, and Tylenol level  - Duonebs q6 hours daily and transition to home inhaler regimen in the AM  - PT evaluation  - OOB to chair as tolerated  - Daily incentive spirometer use

## 2025-01-04 NOTE — ED ADULT NURSE REASSESSMENT NOTE - NS ED NURSE REASSESS COMMENT FT1
while transporting to ccu pt became responsive, awake alert and oriented.  vomited x2 in the elevator. ade zendejas

## 2025-01-04 NOTE — PROGRESS NOTE ADULT - ASSESSMENT
AMS and possible seizure  - etiology unclear  - s/p narcan with possible improvement  - currently, mental status normalized  - MRI     lung cancer with mets to the brain  - eeg    hypotension  - on pressors  - takes midodrine at home     COPD  - stable

## 2025-01-04 NOTE — ED PROVIDER NOTE - CRITICAL CARE ATTENDING CONTRIBUTION TO CARE
59-year-old female history of lung cancer with brain mets, on morphine and oxycodone, hypotension, found on the the floor unresponsive.  Given Narcan by EMS, had a witnessed seizure, subsequently given Valium prior to arrival.  Exam shows poorly responsive patient in no distress, HEENT NCAT pupils pinpoint, neck supple, lungs clear, RR S1S2, abdomen soft NT +BS, no CCE.

## 2025-01-04 NOTE — H&P ADULT - NSHPADDITIONALINFOADULT_GEN_ALL_CORE
Thank you for the opportunity to participate in the care of this patient. Pt's family is aware and in agreement of the plan of care. All questions answered and concerns addressed.

## 2025-01-04 NOTE — H&P ADULT - NSHPLABSRESULTS_GEN_ALL_CORE
10.6   8.80  )-----------( 285      ( 04 Jan 2025 06:20 )             34.1   01-04    140  |  101  |  21[H]  ----------------------------<  96  4.3   |  29  |  0.6[L]    Ca    8.8      04 Jan 2025 06:20  Phos  3.9     01-04  Mg     1.9     01-04    TPro  6.0  /  Alb  3.2[L]  /  TBili  0.2  /  DBili  <0.2  /  AST  22  /  ALT  18  /  AlkPhos  369[H]  01-04

## 2025-01-05 LAB — PROLACTIN SERPL-MCNC: 49.2 NG/ML — HIGH (ref 3.4–24.1)

## 2025-01-05 PROCEDURE — 93010 ELECTROCARDIOGRAM REPORT: CPT

## 2025-01-05 PROCEDURE — 95819 EEG AWAKE AND ASLEEP: CPT | Mod: 26

## 2025-01-05 PROCEDURE — 99233 SBSQ HOSP IP/OBS HIGH 50: CPT

## 2025-01-05 PROCEDURE — 99223 1ST HOSP IP/OBS HIGH 75: CPT

## 2025-01-05 RX ADMIN — IPRATROPIUM BROMIDE AND ALBUTEROL SULFATE 3 MILLILITER(S): .5; 2.5 SOLUTION RESPIRATORY (INHALATION) at 19:44

## 2025-01-05 RX ADMIN — Medication 15 MILLIGRAM(S): at 05:18

## 2025-01-05 RX ADMIN — Medication 20 MILLIGRAM(S): at 13:14

## 2025-01-05 RX ADMIN — MIDODRINE HYDROCHLORIDE 10 MILLIGRAM(S): 5 TABLET ORAL at 21:42

## 2025-01-05 RX ADMIN — IPRATROPIUM BROMIDE AND ALBUTEROL SULFATE 3 MILLILITER(S): .5; 2.5 SOLUTION RESPIRATORY (INHALATION) at 07:46

## 2025-01-05 RX ADMIN — Medication 20 MILLIGRAM(S): at 05:15

## 2025-01-05 RX ADMIN — Medication 20 MILLIGRAM(S): at 19:30

## 2025-01-05 RX ADMIN — Medication 20 MILLIGRAM(S): at 12:14

## 2025-01-05 RX ADMIN — HEPARIN SODIUM 5000 UNIT(S): 1000 INJECTION, SOLUTION INTRAVENOUS; SUBCUTANEOUS at 05:15

## 2025-01-05 RX ADMIN — HEPARIN SODIUM 5000 UNIT(S): 1000 INJECTION, SOLUTION INTRAVENOUS; SUBCUTANEOUS at 18:36

## 2025-01-05 RX ADMIN — Medication 20 MILLIGRAM(S): at 06:15

## 2025-01-05 RX ADMIN — Medication 15 MILLIGRAM(S): at 19:30

## 2025-01-05 RX ADMIN — IPRATROPIUM BROMIDE AND ALBUTEROL SULFATE 3 MILLILITER(S): .5; 2.5 SOLUTION RESPIRATORY (INHALATION) at 15:03

## 2025-01-05 RX ADMIN — MIDODRINE HYDROCHLORIDE 10 MILLIGRAM(S): 5 TABLET ORAL at 05:15

## 2025-01-05 RX ADMIN — ACETAMINOPHEN 975 MILLIGRAM(S): 80 SOLUTION/ DROPS ORAL at 22:45

## 2025-01-05 RX ADMIN — ACETAMINOPHEN 975 MILLIGRAM(S): 80 SOLUTION/ DROPS ORAL at 21:53

## 2025-01-05 RX ADMIN — Medication 20 MILLIGRAM(S): at 18:35

## 2025-01-05 RX ADMIN — CHLORHEXIDINE GLUCONATE 1 APPLICATION(S): 1.2 RINSE ORAL at 05:18

## 2025-01-05 RX ADMIN — Medication 15 MILLIGRAM(S): at 18:36

## 2025-01-05 RX ADMIN — Medication 15 MILLIGRAM(S): at 06:15

## 2025-01-05 NOTE — CONSULT NOTE ADULT - ASSESSMENT
IMPRESSION:  alter mental status   ?? seizure   overdose   hypotension resolved   lung cancer with mets       PLAN:    CNS: MRI reviewed follow neurology recommendation   EEG   caution with pain tello   HEENT: oral care     PULMONARY: keep pox >92%     CARDIOVASCULAR:echo   do CHEETAH   EKG   CE once     GI: GI prophylaxis.  Feeding start speech and swallow eval     RENAL: follow bun, cr lytes     INFECTIOUS DISEASE:    HEMATOLOGICAL:  DVT prophylaxis.    ENDOCRINE:  Follow up FS.  Insulin protocol if needed.  check TSH     SDU         CRITICAL CARE TIME SPENT: ***

## 2025-01-05 NOTE — PROVIDER CONTACT NOTE (OTHER) - ACTION/TREATMENT ORDERED:
Annual chart review completed.    Pt doing labs at regular advised interval.    [facility].  
STAT ECG performed. Troponin T blood work sent. Cardiology consult ordered.

## 2025-01-05 NOTE — EEG REPORT - NS EEG TEXT BOX
Findings:  Background:  .continues awake / drowsy and sleep    Voltage:  normal    Organization:  less than optimal    Posterior Dominant Rhythm:  8-9 hz, symmetrical and superimposed by lower range theta    Variability:  Yes  	  Reactivity:  Yes    Sleep:  symmetrical pattern    Focal abnormalities:  none  Interictal Activity:   Bifrontal polymorphic delta left more than right    Location:    Focal Slowing:  bifrontal left >> right    Generalized Slowing:  borderline to mild    Events:  1)	No electrographic seizures or significant clinical events.  Provocations:  1)	Hyperventilation: was not performed    2)	Photic stimulation: was not performed    Impression: abnormal due to the focal and generalized slowing ad abnormal interictal as above      Clinical Correlation: C/W  focal and diffuse cerebral electrophysiological dysfunction. Suggestive of structural / white matter disorder in frontal region left > righ

## 2025-01-05 NOTE — PROGRESS NOTE ADULT - ASSESSMENT
AMS and possible seizure  - etiology unclear  - s/p narcan with possible improvement  - MRI shows ring enhancing lesions  - neuro f/u  - eeg pending    lung cancer with mets to the brain  - eeg    hypotension  - on pressors  - takes midodrine at home     COPD  - stable

## 2025-01-06 LAB
ALBUMIN SERPL ELPH-MCNC: 3.6 G/DL — SIGNIFICANT CHANGE UP (ref 3.5–5.2)
ALP SERPL-CCNC: 361 U/L — HIGH (ref 30–115)
ALT FLD-CCNC: 23 U/L — SIGNIFICANT CHANGE UP (ref 0–41)
ANION GAP SERPL CALC-SCNC: 11 MMOL/L — SIGNIFICANT CHANGE UP (ref 7–14)
AST SERPL-CCNC: 28 U/L — SIGNIFICANT CHANGE UP (ref 0–41)
BASOPHILS # BLD AUTO: 0.03 K/UL — SIGNIFICANT CHANGE UP (ref 0–0.2)
BASOPHILS NFR BLD AUTO: 0.5 % — SIGNIFICANT CHANGE UP (ref 0–1)
BILIRUB SERPL-MCNC: 0.3 MG/DL — SIGNIFICANT CHANGE UP (ref 0.2–1.2)
BUN SERPL-MCNC: 10 MG/DL — SIGNIFICANT CHANGE UP (ref 10–20)
CALCIUM SERPL-MCNC: 9.5 MG/DL — SIGNIFICANT CHANGE UP (ref 8.4–10.5)
CHLORIDE SERPL-SCNC: 93 MMOL/L — LOW (ref 98–110)
CO2 SERPL-SCNC: 29 MMOL/L — SIGNIFICANT CHANGE UP (ref 17–32)
CREAT SERPL-MCNC: 0.5 MG/DL — LOW (ref 0.7–1.5)
EGFR: 107 ML/MIN/1.73M2 — SIGNIFICANT CHANGE UP
EOSINOPHIL # BLD AUTO: 0.04 K/UL — SIGNIFICANT CHANGE UP (ref 0–0.7)
EOSINOPHIL NFR BLD AUTO: 0.6 % — SIGNIFICANT CHANGE UP (ref 0–8)
GLUCOSE SERPL-MCNC: 122 MG/DL — HIGH (ref 70–99)
HCT VFR BLD CALC: 37.2 % — SIGNIFICANT CHANGE UP (ref 37–47)
HGB BLD-MCNC: 11.7 G/DL — LOW (ref 12–16)
IMM GRANULOCYTES NFR BLD AUTO: 0.3 % — SIGNIFICANT CHANGE UP (ref 0.1–0.3)
LYMPHOCYTES # BLD AUTO: 1.25 K/UL — SIGNIFICANT CHANGE UP (ref 1.2–3.4)
LYMPHOCYTES # BLD AUTO: 19 % — LOW (ref 20.5–51.1)
MAGNESIUM SERPL-MCNC: 1.5 MG/DL — LOW (ref 1.8–2.4)
MCHC RBC-ENTMCNC: 26.2 PG — LOW (ref 27–31)
MCHC RBC-ENTMCNC: 31.5 G/DL — LOW (ref 32–37)
MCV RBC AUTO: 83.2 FL — SIGNIFICANT CHANGE UP (ref 81–99)
MONOCYTES # BLD AUTO: 0.36 K/UL — SIGNIFICANT CHANGE UP (ref 0.1–0.6)
MONOCYTES NFR BLD AUTO: 5.5 % — SIGNIFICANT CHANGE UP (ref 1.7–9.3)
NEUTROPHILS # BLD AUTO: 4.89 K/UL — SIGNIFICANT CHANGE UP (ref 1.4–6.5)
NEUTROPHILS NFR BLD AUTO: 74.1 % — SIGNIFICANT CHANGE UP (ref 42.2–75.2)
NRBC # BLD: 0 /100 WBCS — SIGNIFICANT CHANGE UP (ref 0–0)
PLATELET # BLD AUTO: 277 K/UL — SIGNIFICANT CHANGE UP (ref 130–400)
PMV BLD: 9.7 FL — SIGNIFICANT CHANGE UP (ref 7.4–10.4)
POTASSIUM SERPL-MCNC: 4.2 MMOL/L — SIGNIFICANT CHANGE UP (ref 3.5–5)
POTASSIUM SERPL-SCNC: 4.2 MMOL/L — SIGNIFICANT CHANGE UP (ref 3.5–5)
PROT SERPL-MCNC: 6.6 G/DL — SIGNIFICANT CHANGE UP (ref 6–8)
RBC # BLD: 4.47 M/UL — SIGNIFICANT CHANGE UP (ref 4.2–5.4)
RBC # FLD: 16.1 % — HIGH (ref 11.5–14.5)
SODIUM SERPL-SCNC: 133 MMOL/L — LOW (ref 135–146)
T4 FREE+ TSH PNL SERPL: 0.69 UIU/ML — SIGNIFICANT CHANGE UP (ref 0.27–4.2)
TROPONIN T, HIGH SENSITIVITY RESULT: 18 NG/L — HIGH (ref 6–13)
TROPONIN T, HIGH SENSITIVITY RESULT: 18 NG/L — HIGH (ref 6–13)
WBC # BLD: 6.59 K/UL — SIGNIFICANT CHANGE UP (ref 4.8–10.8)
WBC # FLD AUTO: 6.59 K/UL — SIGNIFICANT CHANGE UP (ref 4.8–10.8)

## 2025-01-06 PROCEDURE — 93010 ELECTROCARDIOGRAM REPORT: CPT

## 2025-01-06 PROCEDURE — 99232 SBSQ HOSP IP/OBS MODERATE 35: CPT

## 2025-01-06 PROCEDURE — 99233 SBSQ HOSP IP/OBS HIGH 50: CPT

## 2025-01-06 PROCEDURE — 99223 1ST HOSP IP/OBS HIGH 75: CPT

## 2025-01-06 RX ORDER — MAGNESIUM SULFATE 500 MG/ML
2 INJECTION, SOLUTION INTRAMUSCULAR; INTRAVENOUS ONCE
Refills: 0 | Status: COMPLETED | OUTPATIENT
Start: 2025-01-06 | End: 2025-01-06

## 2025-01-06 RX ORDER — LEVETIRACETAM 100 MG/ML
500 SOLUTION ORAL
Refills: 0 | Status: DISCONTINUED | OUTPATIENT
Start: 2025-01-06 | End: 2025-01-07

## 2025-01-06 RX ORDER — ATORVASTATIN CALCIUM 40 MG/1
40 TABLET, FILM COATED ORAL AT BEDTIME
Refills: 0 | Status: DISCONTINUED | OUTPATIENT
Start: 2025-01-06 | End: 2025-01-07

## 2025-01-06 RX ORDER — ASPIRIN 81 MG
81 TABLET, DELAYED RELEASE (ENTERIC COATED) ORAL DAILY
Refills: 0 | Status: DISCONTINUED | OUTPATIENT
Start: 2025-01-06 | End: 2025-01-07

## 2025-01-06 RX ORDER — METOPROLOL TARTRATE 50 MG
25 TABLET ORAL DAILY
Refills: 0 | Status: DISCONTINUED | OUTPATIENT
Start: 2025-01-06 | End: 2025-01-07

## 2025-01-06 RX ADMIN — Medication 20 MILLIGRAM(S): at 20:03

## 2025-01-06 RX ADMIN — Medication 15 MILLIGRAM(S): at 07:15

## 2025-01-06 RX ADMIN — Medication 20 MILLIGRAM(S): at 00:48

## 2025-01-06 RX ADMIN — IPRATROPIUM BROMIDE AND ALBUTEROL SULFATE 3 MILLILITER(S): .5; 2.5 SOLUTION RESPIRATORY (INHALATION) at 07:31

## 2025-01-06 RX ADMIN — Medication 20 MILLIGRAM(S): at 12:58

## 2025-01-06 RX ADMIN — Medication 400 MILLIGRAM(S): at 18:00

## 2025-01-06 RX ADMIN — Medication 20 MILLIGRAM(S): at 13:28

## 2025-01-06 RX ADMIN — Medication 20 MILLIGRAM(S): at 06:57

## 2025-01-06 RX ADMIN — Medication 15 MILLIGRAM(S): at 18:01

## 2025-01-06 RX ADMIN — IPRATROPIUM BROMIDE AND ALBUTEROL SULFATE 3 MILLILITER(S): .5; 2.5 SOLUTION RESPIRATORY (INHALATION) at 20:42

## 2025-01-06 RX ADMIN — ATORVASTATIN CALCIUM 40 MILLIGRAM(S): 40 TABLET, FILM COATED ORAL at 21:27

## 2025-01-06 RX ADMIN — LEVETIRACETAM 500 MILLIGRAM(S): 100 SOLUTION ORAL at 18:00

## 2025-01-06 RX ADMIN — Medication 15 MILLIGRAM(S): at 06:27

## 2025-01-06 RX ADMIN — IPRATROPIUM BROMIDE AND ALBUTEROL SULFATE 3 MILLILITER(S): .5; 2.5 SOLUTION RESPIRATORY (INHALATION) at 13:19

## 2025-01-06 RX ADMIN — Medication 15 MILLIGRAM(S): at 18:31

## 2025-01-06 RX ADMIN — Medication 20 MILLIGRAM(S): at 01:45

## 2025-01-06 RX ADMIN — Medication 20 MILLIGRAM(S): at 07:27

## 2025-01-06 RX ADMIN — Medication 20 MILLIGRAM(S): at 21:05

## 2025-01-06 RX ADMIN — CHLORHEXIDINE GLUCONATE 1 APPLICATION(S): 1.2 RINSE ORAL at 06:27

## 2025-01-06 RX ADMIN — HEPARIN SODIUM 5000 UNIT(S): 1000 INJECTION, SOLUTION INTRAVENOUS; SUBCUTANEOUS at 06:27

## 2025-01-06 NOTE — PROGRESS NOTE ADULT - ASSESSMENT
IMPRESSION:  alter mental status   ?? seizure   overdose   hypotension resolved   lung cancer with mets       PLAN:    CNS: MRI reviewed follow neurology recommendation   EEG   caution with pain tello   HEENT: oral care     PULMONARY: keep pox >92%     CARDIOVASCULAR:echo   EKG       GI: GI prophylaxis.  Feeding      RENAL: follow bun, cr lytes     INFECTIOUS DISEASE:    HEMATOLOGICAL:  DVT prophylaxis.    ENDOCRINE:  Follow up FS.  Insulin protocol if needed.  check TSH     tele   follow neurology   recall prn         CRITICAL CARE TIME SPENT: ***

## 2025-01-06 NOTE — PROGRESS NOTE ADULT - SUBJECTIVE AND OBJECTIVE BOX
Patient seen and evaluated this am, awake and alert in bed, back to baseline, states she took an Oxycodone pill and 2 marijuana gummies and was found unresponsive. now awake and back to baseline with no complaints     T(F): 96.8 (01-06-25 @ 07:17), Max: 98.7 (01-05-25 @ 12:15)  HR: 68 (01-06-25 @ 08:00)  BP: 137/72 (01-06-25 @ 08:00)  RR: 20 (01-06-25 @ 08:00)  SpO2: 100% (01-06-25 @ 08:00) (90% - 100%)    PHYSICAL EXAM:  GENERAL: NAD  HEAD:  Atraumatic, Normocephalic  EYES: EOMI, PERRLA, conjunctiva and sclera clear  NERVOUS SYSTEM:  Alert & Oriented X3, no focal deficits   CHEST/LUNG: Clear to percussion bilaterally; No rales, rhonchi, wheezing, or rubs  HEART: Regular rate and rhythm; No murmurs, rubs, or gallops  ABDOMEN: Soft, Nontender, Nondistended; Bowel sounds present  EXTREMITIES:  2+ Peripheral Pulses, No clubbing, cyanosis, or edema        CARDIAC ENZYMES  Troponin T, High Sensitivity (01.06.25 @ 04:14)   Troponin T, High Sensitivity Result: 18    < from: 12 Lead ECG (01.03.25 @ 22:23) >  Diagnosis Line    Normal sinus rhythm  Rightward axis  Borderline ECG    Confirmed by Aguilar Guzman ([id number]) on 1/4/2025 7:53:10 AM    < end of copied text >    · EEG Report    Findings:  Background:  .continues awake / drowsy and sleep    Voltage:  normal    Organization:  less than optimal    Posterior Dominant Rhythm:  8-9 hz, symmetrical and superimposed by lower range theta    Variability:  Yes  	  Reactivity:  Yes    Sleep:  symmetrical pattern    Focal abnormalities:  none  Interictal Activity:   Bifrontal polymorphic delta left more than right    Location:    Focal Slowing:  bifrontal left >> right    Generalized Slowing:  borderline to mild    Events:  1)	No electrographic seizures or significant clinical events.  Provocations:  1)	Hyperventilation: was not performed    2)	Photic stimulation: was not performed    Impression: abnormal due to the focal and generalized slowing ad abnormal interictal as above      Clinical Correlation: C/W  focal and diffuse cerebral electrophysiological dysfunction. Suggestive of structural / white matter disorder in frontal region left > [facility]          RADIOLOGY  < from: MR Head w/wo IV Cont (01.04.25 @ 15:39) >    IMPRESSION:  Two left-sided ring-enhancing lesions as described above, both   demonstrating similar characteristics and stable in size and appearance   on CT modality from September 2023.    < end of copied text >    MEDICATIONS  (STANDING):  albuterol/ipratropium for Nebulization 3 milliLiter(s) Nebulizer every 6 hours  chlorhexidine 2% Cloths 1 Application(s) Topical <User Schedule>  heparin   Injectable 5000 Unit(s) SubCutaneous every 12 hours  morphine ER Tablet 15 milliGRAM(s) Oral every 12 hours    MEDICATIONS  (PRN):  acetaminophen     Tablet .. 975 milliGRAM(s) Oral every 8 hours PRN Mild Pain (1 - 3)  oxyCODONE    IR 20 milliGRAM(s) Oral every 6 hours PRN Moderate Pain (4 - 6)    
pt calm, back to likely baseline    T(F): , Max: 98.7 (01-05-25 @ 12:15)  HR: 58 (01-05-25 @ 21:00) (47 - 72)  BP: 120/67 (01-05-25 @ 22:49)  RR: 22 (01-05-25 @ 22:49)  SpO2: 99% (01-05-25 @ 22:49)  IN: 1020.4 mL / OUT: 1375 mL / NET: -354.6 mL    IN: 1300 mL / OUT: 2440 mL / NET: -1140 mL      General: No apparent distress  Cardiovascular: S1, S2  Gastrointestinal: Soft, Non-tender, Non-distended  Respiratory: Good air entry bilaterally  Dermatologic: Skin dry                          10.6   8.80  )-----------( 285      ( 04 Jan 2025 06:20 )             34.1     01-04    140  |  101  |  21[H]  ----------------------------<  96  4.3   |  29  |  0.6[L]    Ca    8.8      04 Jan 2025 06:20  Phos  3.9     01-04  Mg     1.9     01-04    TPro  6.0  /  Alb  3.2[L]  /  TBili  0.2  /  DBili  <0.2  /  AST  22  /  ALT  18  /  AlkPhos  369[H]  01-04    
Patient is a 59y old  Female who presents with a chief complaint of     Over Night Events:  Patient seen and examined.     no pressors on RA   VS stable   ROS:  See HPI    PHYSICAL EXAM    ICU Vital Signs Last 24 Hrs  T(C): 36 (2025 07:17), Max: 37.1 (2025 12:15)  T(F): 96.8 (2025 07:17), Max: 98.7 (2025 12:15)  HR: 68 (2025 08:00) (41 - 72)  BP: 137/72 (2025 08:00) (114/68 - 141/65)  BP(mean): 99 (2025 08:00) (86 - 99)  ABP: --  ABP(mean): --  RR: 20 (2025 08:00) (18 - 31)  SpO2: 100% (2025 08:00) (90% - 100%)    O2 Parameters below as of 2025 08:00  Patient On (Oxygen Delivery Method): nasal cannula  O2 Flow (L/min): 2          General:awake   HEENT:     paz           Lymph Nodes: NO cervical LN   Lungs: Bilateral BS  Cardiovascular: Regular   Abdomen: Soft, Positive BS  Extremities: No clubbing   Skin: warm   Neurological: no focal   Musculoskeletal: move all ext     I&O's Detail    2025 07:01  -  2025 07:00  --------------------------------------------------------  IN:    Oral Fluid: 1390 mL  Total IN: 1390 mL    OUT:    Voided (mL): 3090 mL  Total OUT: 3090 mL    Total NET: -1700 mL      2025 07:01  -  2025 09:25  --------------------------------------------------------  IN:    Oral Fluid: 300 mL  Total IN: 300 mL    OUT:  Total OUT: 0 mL    Total NET: 300 mL          LABS:                                                                                                Urinalysis Basic - ( 2025 12:03 )    Color: Yellow / Appearance: Clear / S.020 / pH: x  Gluc: x / Ketone: Negative mg/dL  / Bili: Negative / Urobili: 0.2 mg/dL   Blood: x / Protein: Trace mg/dL / Nitrite: Negative   Leuk Esterase: Small / RBC: 5 /HPF / WBC 15 /HPF   Sq Epi: x / Non Sq Epi: 20 /HPF / Bacteria: Few /HPF        Lactate, Blood: 1.2 mmol/L (25 @ 11:57)                                                                                                                                               MEDICATIONS  (STANDING):  albuterol/ipratropium for Nebulization 3 milliLiter(s) Nebulizer every 6 hours  chlorhexidine 2% Cloths 1 Application(s) Topical <User Schedule>  heparin   Injectable 5000 Unit(s) SubCutaneous every 12 hours  morphine ER Tablet 15 milliGRAM(s) Oral every 12 hours    MEDICATIONS  (PRN):  acetaminophen     Tablet .. 975 milliGRAM(s) Oral every 8 hours PRN Mild Pain (1 - 3)  oxyCODONE    IR 20 milliGRAM(s) Oral every 6 hours PRN Moderate Pain (4 - 6)          Xrays:                                                                                   ECHO:  [facility] ICU:

## 2025-01-06 NOTE — CONSULT NOTE ADULT - SUBJECTIVE AND OBJECTIVE BOX
Patient is a 59y old  Female who presents with a chief complaint of     HPI:  Pt is a 58 yo female PMH lung cancer with mets to the brain, HTN, COPD who presents with complaint of AMS at which time the pt found the pt down and hypoxic prompting EMS contact. Upon ED presentation, the pt's family states that she had a similar episode about a month ago where she required BiPAP and vasopressor support. Additionally, the pt required initiation of low dose vasopressors upon ED presentation. Of note, during transportation to the ED, the pt had a witnessed seizre where EMS gave her 5mg of Valium. This was after the pt received Narcan upon EMS presentation at the pt's home. Pt did receive an additional dose of Valium upon ED presentation but pt had a minimal response. Upon critical care evaluation the pt is somnolent and not responding to tactile stimuli but is noted to be protecting her airway. No other complaints or concerns noted at this time.  this morning awake follow command   off pressors since mid night          PAST MEDICAL & SURGICAL HISTORY:  Lower back pain      Mild anemia      Stage 4 lung cancer      History of appendectomy      History of tonsillectomy      H/O laparoscopy        Allergies    No Known Allergies    Intolerances      Family history : no cardiovscular family history   Home Medications:  gabapentin 300 mg oral tablet: 1 tab(s) orally 2 times a day (04 Sep 2023 15:19)  oxyCODONE 15 mg oral tablet: 1 tab(s) orally every 6 hours as needed for  pain (04 Sep 2023 15:19)    Occupation:  Alochol: Denied  Smoking: Denied  Drug Use: Denied  Marital Status:         ROS: as in HPI; All other systems reviewed are negative    ICU Vital Signs Last 24 Hrs  T(C): 35.6 (2025 01:30), Max: 36.7 (2025 19:00)  T(F): 96 (2025 01:30), Max: 98.1 (2025 19:00)  HR: 52 (2025 03:00) (46 - 81)  BP: 127/58 (2025 03:00) (85/51 - 129/62)  BP(mean): 83 (2025 03:00) (64 - 91)  ABP: --  ABP(mean): --  RR: 17 (2025 03:00) (10 - 33)  SpO2: 99% (2025 03:00) (92% - 100%)    O2 Parameters below as of 2025 03:00  Patient On (Oxygen Delivery Method): nasal cannula  O2 Flow (L/min): 2            Physical Examination:    General: No acute distress.  Alert, oriented, interactive, nonfocal    HEENT: Pupils equal, reactive to light.  Symmetric.    PULM: Clear to auscultation bilaterally, no significant sputum production    CVS: Regular rate and rhythm, no murmurs, rubs, or gallops    ABD: Soft, nondistended, nontender, normoactive bowel sounds, no masses    EXT: No edema, nontender, no clubbing     SKIN: Warm and well perfused, no rashes noted.    Neurology : no motor or sensory deficit                  I&O's Detail    2025 07:01  -  2025 07:00  --------------------------------------------------------  IN:    Norepinephrine: 60.4 mL    Oral Fluid: 960 mL  Total IN: 1020.4 mL    OUT:    Voided (mL): 1375 mL  Total OUT: 1375 mL    Total NET: -354.6 mL            LABS:                        10.6   8.80  )-----------( 285      ( 2025 06:20 )             34.1     2025 06:20    140    |  101    |  21     ----------------------------<  96     4.3     |  29     |  0.6      Ca    8.8        2025 06:20  Phos  3.9       2025 06:20  Mg     1.9       2025 06:20            CAPILLARY BLOOD GLUCOSE          Urinalysis Basic - ( 2025 12:03 )    Color: Yellow / Appearance: Clear / S.020 / pH: x  Gluc: x / Ketone: Negative mg/dL  / Bili: Negative / Urobili: 0.2 mg/dL   Blood: x / Protein: Trace mg/dL / Nitrite: Negative   Leuk Esterase: Small / RBC: 5 /HPF / WBC 15 /HPF   Sq Epi: x / Non Sq Epi: 20 /HPF / Bacteria: Few /HPF      Culture    Lactate, Blood: 1.2 mmol/L (25 @ 11:57)      MEDICATIONS  (STANDING):  albuterol/ipratropium for Nebulization 3 milliLiter(s) Nebulizer every 6 hours  chlorhexidine 2% Cloths 1 Application(s) Topical <User Schedule>  heparin   Injectable 5000 Unit(s) SubCutaneous every 12 hours  midodrine 10 milliGRAM(s) Oral every 8 hours  morphine ER Tablet 15 milliGRAM(s) Oral every 12 hours  norepinephrine Infusion 0.05 MICROgram(s)/kG/Min (4.68 mL/Hr) IV Continuous <Continuous>    MEDICATIONS  (PRN):  acetaminophen     Tablet .. 975 milliGRAM(s) Oral every 8 hours PRN Mild Pain (1 - 3)  oxyCODONE    IR 20 milliGRAM(s) Oral every 6 hours PRN Moderate Pain (4 - 6)        RADIOLOGY: ***     CXR:LEft apical mass   TLC:  OG:  ET tube:        [facility] ICU:  ECHO:      
JOSIE ALMANZAR     59y     Female    MRN-[id number]                                                           CC:Patient is a 59y old  Female who presents with a chief complaint of     HPI:  Pt is a 58 yo female PMH lung cancer with mets to the brain, HTN, COPD who presents with complaint of AMS at which time the pt found the pt down and hypoxic prompting EMS contact. Upon ED presentation, the pt's family states that she had a similar episode about a month ago where she required BiPAP and vasopressor support. Additionally, the pt required initiation of low dose vasopressors upon ED presentation. Of note, during transportation to the ED, the pt had a witnessed seizre where EMS gave her 5mg of Valium. This was after the pt received Narcan upon EMS presentation at the pt's home. Pt did receive an additional dose of Valium upon ED presentation but pt had a minimal response. Upon critical care evaluation the pt is somnolent and not responding to tactile stimuli but is noted to be protecting her airway. No other complaints or concerns noted at this time.     (04 Jan 2025 05:02)    Patient denies ever having seizure.  Says new lung lesion found and scheduled at end of January at [facility] [place] to get MRI brain to screen for mets again.  Prior metastatic lesion to brain treated with radiation and left with residual scarring and no active lesion in brain as per patient    ROS:  Constitutional, Neurological, Psychiatric, Eyes, ENT, Cardiovascular, Respiratory, Gastrointestinal, Genitourinary, Musculoskeletal, Integumentary, Endocrine and Heme/Lymph are otherwise negative. Except for noted above    Social History: No smoking, No drinking, No drug use    FAMILY HISTORY:  No pertinent family history in first degree relatives     : non-contributory    HEALTH ISSUES - PROBLEM Dx:  Acute encephalopathy            Vital Signs Last 24 Hrs  T(C): 36.2 (04 Jan 2025 11:24), Max: 36.8 (03 Jan 2025 21:16)  T(F): 97.2 (04 Jan 2025 11:24), Max: 98.3 (04 Jan 2025 03:00)  HR: 58 (04 Jan 2025 08:00) (48 - 82)  BP: 121/65 (04 Jan 2025 11:24) (69/43 - 162/77)  BP(mean): 88 (04 Jan 2025 11:24) (51 - 111)  RR: 20 (04 Jan 2025 11:24) (9 - 27)  SpO2: 92% (04 Jan 2025 11:24) (91% - 100%)    Parameters below as of 04 Jan 2025 08:00  Patient On (Oxygen Delivery Method): nasal cannula        Physical Exam:  Constitutional: alert and in no acute distress.  Eyes: the sclera and conjunctiva were normal, pupils were equal in size, round, reactive to light, with normal accommodation and extraocular movements were intact.   Back: no costovertebral angle tenderness and no spinal tenderness.      Neuro Exam:  Orientation: oriented to person, oriented to place and oriented to time.   Attention: visual attention was not decreased.   Language: no difficulty naming common objects, no difficulty repeating a phrase, no difficulty writing a sentence, fluency intact, comprehension intact and reading intact.   Fund of knowledge: displays adequate knowledge of personal past history.   Cranial Nerves: visual fields full to confrontation, pupils equal round and reactive to light, extraocular motion intact, facial sensation intact symmetrically, face symmetrical, hearing was intact bilaterally, tongue and palate midline, head turning and shoulder shrug symmetric and there was no tongue deviation with protrusion.   Motor: Power >4+/5 throughout  Sensory symmetric to LT, Vib, Temp  Coordination:. deferred gait. there was no past-pointing. no tremor present.   Deep tendon reflexes:   1+ in UE and 3+ in LE  Plantar responses normal on the right, normal on the left.      NIHSS:     Allergies    No Known Allergies    Intolerances       Home Medications:  gabapentin 300 mg oral tablet: 1 tab(s) orally 2 times a day (04 Sep 2023 15:19)  oxyCODONE 15 mg oral tablet: 1 tab(s) orally every 6 hours as needed for  pain (04 Sep 2023 15:19)      MEDICATIONS  (STANDING):  albuterol/ipratropium for Nebulization 3 milliLiter(s) Nebulizer every 6 hours  chlorhexidine 2% Cloths 1 Application(s) Topical <User Schedule>  heparin   Injectable 5000 Unit(s) SubCutaneous every 12 hours  midodrine 10 milliGRAM(s) Oral every 8 hours  morphine ER Tablet 15 milliGRAM(s) Oral every 12 hours  norepinephrine Infusion 0.05 MICROgram(s)/kG/Min (4.68 mL/Hr) IV Continuous <Continuous>    MEDICATIONS  (PRN):  acetaminophen     Tablet .. 975 milliGRAM(s) Oral every 8 hours PRN Mild Pain (1 - 3)  oxyCODONE    IR 20 milliGRAM(s) Oral every 6 hours PRN Moderate Pain (4 - 6)      LABS:                        10.6   8.80  )-----------( 285      ( 04 Jan 2025 06:20 )             34.1     01-04    140  |  101  |  21[H]  ----------------------------<  96  4.3   |  29  |  0.6[L]    Ca    8.8      04 Jan 2025 06:20  Phos  3.9     01-04  Mg     1.9     01-04    TPro  6.0  /  Alb  3.2[L]  /  TBili  0.2  /  DBili  <0.2  /  AST  22  /  ALT  18  /  AlkPhos  369[H]  01-04            Neuro Imaging:  NCHCT:   < from: CT Head No Cont (01.03.25 @ 22:05) >  IMPRESSION:    No acute intracranial hemorrhage or midline shift.    Redemonstrated indeterminate lesion in the left cerebellar hemisphere.   Recommend nonemergent outpatient contrast-enhanced MRI of the brain for   further evaluation.    --- End of Report ---    < end of copied text >      EEG: pending        Assessment / Plan: This is a 59y year old Female presenting with AMS and found to be hypoxic.  Had witnessed seizure by EMS and given ativan.  Patient never had seizure prior.  Had prior mets to brain treated with radiation and has new lung cancer with a screening MRI brain scheduled at end of January 2025.  Possible seizure  1. MRI brain w/w/o MAHSA  2. REEG  3. Continue medical management  4. Seizure precautions  5. Keep Mg >2.0                
CARDIOLOGY CONSULT NOTE     CHIEF COMPLAINT/REASON FOR CONSULT:    HPI:  Pt is a 60 yo female PMH lung cancer with mets to the brain, HTN, COPD who presents with complaint of AMS at which time the pt found the pt down and hypoxic prompting EMS contact. Upon ED presentation, the pt's family states that she had a similar episode about a month ago where she required BiPAP and vasopressor support. Additionally, the pt required initiation of low dose vasopressors upon ED presentation. Of note, during transportation to the ED, the pt had a witnessed seizre where EMS gave her 5mg of Valium. This was after the pt received Narcan upon EMS presentation at the pt's home. Pt did receive an additional dose of Valium upon ED presentation but pt had a minimal response. Upon critical care evaluation the pt is somnolent and not responding to tactile stimuli but is noted to be protecting her airway. No other complaints or concerns noted at this time.     (04 Jan 2025 05:02)      PAST MEDICAL & SURGICAL HISTORY:  Lower back pain      Mild anemia      Stage 4 lung cancer      History of appendectomy      History of tonsillectomy      H/O laparoscopy          Cardiac Risks:   [ ]HTN, [ ] DM, [ ] Smoking, [ ] FH,  [ ] Lipids        MEDICATIONS:  MEDICATIONS  (STANDING):  albuterol/ipratropium for Nebulization 3 milliLiter(s) Nebulizer every 6 hours  chlorhexidine 2% Cloths 1 Application(s) Topical <User Schedule>  heparin   Injectable 5000 Unit(s) SubCutaneous every 12 hours  morphine ER Tablet 15 milliGRAM(s) Oral every 12 hours      FAMILY HISTORY:  No pertinent family history in first degree relatives        SOCIAL HISTORY:      [ ] Marital status  Allergies    No Known Allergies        	    REVIEW OF SYSTEMS:  CONSTITUTIONAL: No fever, weight loss, or fatigue  EYES: No eye pain, visual disturbances, or discharge  ENMT:  No difficulty hearing, tinnitus, vertigo; No sinus or throat pain  NECK: No pain or stiffness  RESPIRATORY: No cough, wheezing, chills or hemoptysis; No Shortness of Breath  CARDIOVASCULAR: No chest pain, palpitations, passing out, dizziness, or leg swelling  GASTROINTESTINAL: No abdominal or epigastric pain. No nausea, vomiting, or hematemesis; No diarrhea or constipation. No melena or hematochezia.  GENITOURINARY: No dysuria, frequency, hematuria, or incontinence  NEUROLOGICAL: No headaches, memory loss, loss of strength, numbness, or tremors  SKIN: No itching, burning, rashes, or lesions   	        PHYSICAL EXAM:  T(C): 36 (01-06-25 @ 07:17), Max: 37.1 (01-05-25 @ 12:15)  HR: 68 (01-06-25 @ 08:00) (41 - 72)  BP: 137/72 (01-06-25 @ 08:00) (114/68 - 141/65)  RR: 20 (01-06-25 @ 08:00) (18 - 31)  SpO2: 100% (01-06-25 @ 08:00) (90% - 100%)  Wt(kg): --  I&O's Summary    05 Jan 2025 07:01  -  06 Jan 2025 07:00  --------------------------------------------------------  IN: 1390 mL / OUT: 3090 mL / NET: -1700 mL    06 Jan 2025 07:01  -  06 Jan 2025 09:25  --------------------------------------------------------  IN: 300 mL / OUT: 0 mL / NET: 300 mL        Appearance: Normal	  Psychiatry: A & O x 3, Mood & affect appropriate  HEENT:   Normal oral mucosa, PERRL, EOMI	  Lymphatic: No lymphadenopathy  Cardiovascular: Normal S1 S2,RRR, No JVD, No murmurs  Respiratory: Lungs clear to auscultation	  Gastrointestinal:  Soft, Non-tender, + BS	  Skin: No rashes, No ecchymoses, No cyanosis	  Neurologic: Non-focal  Extremities: Normal range of motion, No clubbing, cyanosis or edema  Vascular: Peripheral pulses palpable 2+ bilaterally      ECG:  	  < from: 12 Lead ECG (01.03.25 @ 22:23) >  Diagnosis Line    Normal sinus rhythm  Rightward axis  Borderline ECG    Confirmed by Aguilar Guzman ([id number]) on 1/4/2025 7:53:10 AM    < end of copied text >                            proBNP:   Lipid Profile:   HgA1c:   TSH:     IMP/PLAN:

## 2025-01-06 NOTE — PROGRESS NOTE ADULT - ASSESSMENT
58 yo female with a medical history of  lung cancer with mets to the brain, HTN, COPD who was admitted with transient encephalopathy  and hypoxemia  prompting EMS contact. Patient was not responsive. She had taken oxycodone and 2 marijuana gummies    transient encephalopathy / abnormal EKG     - symptoms have resolved - possibly secondary to oxy/gummies   - start aspirin, beta and statin as per cardio   - neurology f/u for disposition   - DC planning   - I discussed plan with neuro team - they will f/u today   - outpt Lexiscan  cardiac stress test as per cardio 58 yo female with a medical history of  lung cancer with mets to the brain, HTN, COPD who was admitted with transient encephalopathy  and hypoxemia  prompting EMS contact. Patient was not responsive. She had taken oxycodone and 2 marijuana gummies    transient encephalopathy / abnormal EKG     - symptoms have resolved - possibly secondary to oxy/gummies   - start aspirin, beta and statin as per cardio   - I discussed plan with neurology - start Keppra 500mg q12h - may f/u as outpt   - I discussed plan with cardiology - start asa, Toprol and Lipitor - monitor on tele overnight   - probable DC home in am with instructions for outpt Lexiscan  cardiac stress test   - 50 minutes total spent today on patient care

## 2025-01-06 NOTE — CHART NOTE - NSCHARTNOTEFT_GEN_A_CORE
Attempted to obtain nutrition hx from pt/family via phone however pt unable to participate in nutrition interview at this time as per RN. Unable to reach family/emergency contact at this time. BMI 17.3 noted - unable to perform nutrition focused physical exam at this time. Unable to complete nutrition assessment d/t limited information; RD to re-visit pt tomorrow to obtain comprehensive nutrition hx and perform nutrition focused physical exam.

## 2025-01-06 NOTE — CHART NOTE - NSCHARTNOTEFT_GEN_A_CORE
MRI Brain and rEEG reviewed. Start patient on Keppra 500mg bid. F/u outpatient neuro 2 weeks for further dose management.    Discussed with Dr Camara attending

## 2025-01-06 NOTE — PHARMACOTHERAPY INTERVENTION NOTE - COMMENTS
Patient to be started on home medication, atorvastatin 40mg qd with a CK of 459 U/L from 01/04- Recommend to obtain a repeat CK level to reassess.

## 2025-01-06 NOTE — CONSULT NOTE ADULT - ASSESSMENT
Pt is a 60 yo female PMH lung cancer with mets to the brain, HTN, COPD who presents with complaint of AMS at which time the pt found the pt down and hypoxic prompting EMS contact. Patient was not responsive. She had taken oxycodone and 2 marijuana gummies. Now no complains/ Ekg possible ischemia Would check cardiac enzymes. Pt is a 60 yo female PMH lung cancer with mets to the brain, HTN, COPD who presents with complaint of AMS at which time the pt found the pt down and hypoxic prompting EMS contact. Patient was not responsive. She had taken oxycodone and 2 marijuana gummies. Now no complains/ Ekg possible ischemia Would check cardiac enzymes. If able beta asa statin. Medical rx. Will consider out patient Lexiscan . Prognosis guarded

## 2025-01-07 ENCOUNTER — TRANSCRIPTION ENCOUNTER (OUTPATIENT)
Age: 60
End: 2025-01-07

## 2025-01-07 ENCOUNTER — INPATIENT (INPATIENT)
Facility: HOSPITAL | Age: 60
LOS: 2 days | Discharge: HOME | DRG: 948 | End: 2025-01-10
Attending: STUDENT IN AN ORGANIZED HEALTH CARE EDUCATION/TRAINING PROGRAM | Admitting: FAMILY MEDICINE
Payer: MEDICARE

## 2025-01-07 ENCOUNTER — RESULT REVIEW (OUTPATIENT)
Age: 60
End: 2025-01-07

## 2025-01-07 VITALS
OXYGEN SATURATION: 96 % | TEMPERATURE: 98 F | DIASTOLIC BLOOD PRESSURE: 78 MMHG | SYSTOLIC BLOOD PRESSURE: 132 MMHG | RESPIRATION RATE: 18 BRPM | HEART RATE: 70 BPM

## 2025-01-07 VITALS
OXYGEN SATURATION: 92 % | RESPIRATION RATE: 14 BRPM | HEART RATE: 93 BPM | TEMPERATURE: 98 F | SYSTOLIC BLOOD PRESSURE: 100 MMHG | DIASTOLIC BLOOD PRESSURE: 66 MMHG

## 2025-01-07 DIAGNOSIS — Z90.49 ACQUIRED ABSENCE OF OTHER SPECIFIED PARTS OF DIGESTIVE TRACT: Chronic | ICD-10-CM

## 2025-01-07 DIAGNOSIS — Z98.890 OTHER SPECIFIED POSTPROCEDURAL STATES: Chronic | ICD-10-CM

## 2025-01-07 DIAGNOSIS — Z90.89 ACQUIRED ABSENCE OF OTHER ORGANS: Chronic | ICD-10-CM

## 2025-01-07 LAB
A1C WITH ESTIMATED AVERAGE GLUCOSE RESULT: 6 % — HIGH (ref 4–5.6)
ALBUMIN SERPL ELPH-MCNC: 3.6 G/DL — SIGNIFICANT CHANGE UP (ref 3.5–5.2)
ALBUMIN SERPL ELPH-MCNC: 3.9 G/DL — SIGNIFICANT CHANGE UP (ref 3.5–5.2)
ALP SERPL-CCNC: 352 U/L — HIGH (ref 30–115)
ALP SERPL-CCNC: 397 U/L — HIGH (ref 30–115)
ALT FLD-CCNC: 24 U/L — SIGNIFICANT CHANGE UP (ref 0–41)
ALT FLD-CCNC: 38 U/L — SIGNIFICANT CHANGE UP (ref 0–41)
ANION GAP SERPL CALC-SCNC: 18 MMOL/L — HIGH (ref 7–14)
ANION GAP SERPL CALC-SCNC: 9 MMOL/L — SIGNIFICANT CHANGE UP (ref 7–14)
APAP SERPL-MCNC: <5 UG/ML — LOW (ref 10–30)
AST SERPL-CCNC: 24 U/L — SIGNIFICANT CHANGE UP (ref 0–41)
AST SERPL-CCNC: 43 U/L — HIGH (ref 0–41)
BASOPHILS # BLD AUTO: 0.02 K/UL — SIGNIFICANT CHANGE UP (ref 0–0.2)
BASOPHILS # BLD AUTO: 0.03 K/UL — SIGNIFICANT CHANGE UP (ref 0–0.2)
BASOPHILS NFR BLD AUTO: 0.1 % — SIGNIFICANT CHANGE UP (ref 0–1)
BASOPHILS NFR BLD AUTO: 0.5 % — SIGNIFICANT CHANGE UP (ref 0–1)
BILIRUB SERPL-MCNC: 0.4 MG/DL — SIGNIFICANT CHANGE UP (ref 0.2–1.2)
BILIRUB SERPL-MCNC: 1.9 MG/DL — HIGH (ref 0.2–1.2)
BUN SERPL-MCNC: 12 MG/DL — SIGNIFICANT CHANGE UP (ref 10–20)
BUN SERPL-MCNC: 25 MG/DL — HIGH (ref 10–20)
CALCIUM SERPL-MCNC: 9.3 MG/DL — SIGNIFICANT CHANGE UP (ref 8.4–10.5)
CALCIUM SERPL-MCNC: 9.8 MG/DL — SIGNIFICANT CHANGE UP (ref 8.4–10.5)
CHLORIDE SERPL-SCNC: 95 MMOL/L — LOW (ref 98–110)
CHLORIDE SERPL-SCNC: 98 MMOL/L — SIGNIFICANT CHANGE UP (ref 98–110)
CHOLEST SERPL-MCNC: 167 MG/DL — SIGNIFICANT CHANGE UP
CK SERPL-CCNC: 91 U/L — SIGNIFICANT CHANGE UP (ref 0–225)
CO2 SERPL-SCNC: 24 MMOL/L — SIGNIFICANT CHANGE UP (ref 17–32)
CO2 SERPL-SCNC: 31 MMOL/L — SIGNIFICANT CHANGE UP (ref 17–32)
CREAT SERPL-MCNC: 0.6 MG/DL — LOW (ref 0.7–1.5)
CREAT SERPL-MCNC: 1.1 MG/DL — SIGNIFICANT CHANGE UP (ref 0.7–1.5)
EGFR: 103 ML/MIN/1.73M2 — SIGNIFICANT CHANGE UP
EGFR: 58 ML/MIN/1.73M2 — LOW
EOSINOPHIL # BLD AUTO: 0 K/UL — SIGNIFICANT CHANGE UP (ref 0–0.7)
EOSINOPHIL # BLD AUTO: 0.04 K/UL — SIGNIFICANT CHANGE UP (ref 0–0.7)
EOSINOPHIL NFR BLD AUTO: 0 % — SIGNIFICANT CHANGE UP (ref 0–8)
EOSINOPHIL NFR BLD AUTO: 0.6 % — SIGNIFICANT CHANGE UP (ref 0–8)
ESTIMATED AVERAGE GLUCOSE: 126 MG/DL — HIGH (ref 68–114)
ETHANOL SERPL-MCNC: <10 MG/DL — SIGNIFICANT CHANGE UP
FLUAV AG NPH QL: SIGNIFICANT CHANGE UP
FLUBV AG NPH QL: SIGNIFICANT CHANGE UP
GLUCOSE SERPL-MCNC: 132 MG/DL — HIGH (ref 70–99)
GLUCOSE SERPL-MCNC: 86 MG/DL — SIGNIFICANT CHANGE UP (ref 70–99)
HCT VFR BLD CALC: 36.9 % — LOW (ref 37–47)
HCT VFR BLD CALC: 40.9 % — SIGNIFICANT CHANGE UP (ref 37–47)
HDLC SERPL-MCNC: 64 MG/DL — SIGNIFICANT CHANGE UP
HGB BLD-MCNC: 11.9 G/DL — LOW (ref 12–16)
HGB BLD-MCNC: 12.8 G/DL — SIGNIFICANT CHANGE UP (ref 12–16)
IMM GRANULOCYTES NFR BLD AUTO: 0.3 % — SIGNIFICANT CHANGE UP (ref 0.1–0.3)
IMM GRANULOCYTES NFR BLD AUTO: 0.5 % — HIGH (ref 0.1–0.3)
LIPID PNL WITH DIRECT LDL SERPL: 81 MG/DL — SIGNIFICANT CHANGE UP
LYMPHOCYTES # BLD AUTO: 0.5 K/UL — LOW (ref 1.2–3.4)
LYMPHOCYTES # BLD AUTO: 1.41 K/UL — SIGNIFICANT CHANGE UP (ref 1.2–3.4)
LYMPHOCYTES # BLD AUTO: 22.9 % — SIGNIFICANT CHANGE UP (ref 20.5–51.1)
LYMPHOCYTES # BLD AUTO: 3.3 % — LOW (ref 20.5–51.1)
MAGNESIUM SERPL-MCNC: 1.8 MG/DL — SIGNIFICANT CHANGE UP (ref 1.8–2.4)
MCHC RBC-ENTMCNC: 26.3 PG — LOW (ref 27–31)
MCHC RBC-ENTMCNC: 26.7 PG — LOW (ref 27–31)
MCHC RBC-ENTMCNC: 31.3 G/DL — LOW (ref 32–37)
MCHC RBC-ENTMCNC: 32.2 G/DL — SIGNIFICANT CHANGE UP (ref 32–37)
MCV RBC AUTO: 82.7 FL — SIGNIFICANT CHANGE UP (ref 81–99)
MCV RBC AUTO: 84 FL — SIGNIFICANT CHANGE UP (ref 81–99)
MONOCYTES # BLD AUTO: 0.5 K/UL — SIGNIFICANT CHANGE UP (ref 0.1–0.6)
MONOCYTES # BLD AUTO: 0.82 K/UL — HIGH (ref 0.1–0.6)
MONOCYTES NFR BLD AUTO: 5.4 % — SIGNIFICANT CHANGE UP (ref 1.7–9.3)
MONOCYTES NFR BLD AUTO: 8.1 % — SIGNIFICANT CHANGE UP (ref 1.7–9.3)
NEUTROPHILS # BLD AUTO: 13.63 K/UL — HIGH (ref 1.4–6.5)
NEUTROPHILS # BLD AUTO: 4.16 K/UL — SIGNIFICANT CHANGE UP (ref 1.4–6.5)
NEUTROPHILS NFR BLD AUTO: 67.6 % — SIGNIFICANT CHANGE UP (ref 42.2–75.2)
NEUTROPHILS NFR BLD AUTO: 90.7 % — HIGH (ref 42.2–75.2)
NON HDL CHOLESTEROL: 103 MG/DL — SIGNIFICANT CHANGE UP
NRBC # BLD: 0 /100 WBCS — SIGNIFICANT CHANGE UP (ref 0–0)
NRBC # BLD: 0 /100 WBCS — SIGNIFICANT CHANGE UP (ref 0–0)
PLATELET # BLD AUTO: 255 K/UL — SIGNIFICANT CHANGE UP (ref 130–400)
PLATELET # BLD AUTO: 291 K/UL — SIGNIFICANT CHANGE UP (ref 130–400)
PMV BLD: 9 FL — SIGNIFICANT CHANGE UP (ref 7.4–10.4)
PMV BLD: 9.1 FL — SIGNIFICANT CHANGE UP (ref 7.4–10.4)
POTASSIUM SERPL-MCNC: 4.2 MMOL/L — SIGNIFICANT CHANGE UP (ref 3.5–5)
POTASSIUM SERPL-MCNC: 4.9 MMOL/L — SIGNIFICANT CHANGE UP (ref 3.5–5)
POTASSIUM SERPL-SCNC: 4.2 MMOL/L — SIGNIFICANT CHANGE UP (ref 3.5–5)
POTASSIUM SERPL-SCNC: 4.9 MMOL/L — SIGNIFICANT CHANGE UP (ref 3.5–5)
PROT SERPL-MCNC: 6.4 G/DL — SIGNIFICANT CHANGE UP (ref 6–8)
PROT SERPL-MCNC: 7.6 G/DL — SIGNIFICANT CHANGE UP (ref 6–8)
RBC # BLD: 4.46 M/UL — SIGNIFICANT CHANGE UP (ref 4.2–5.4)
RBC # BLD: 4.87 M/UL — SIGNIFICANT CHANGE UP (ref 4.2–5.4)
RBC # FLD: 16.3 % — HIGH (ref 11.5–14.5)
RBC # FLD: 16.3 % — HIGH (ref 11.5–14.5)
RSV RNA NPH QL NAA+NON-PROBE: SIGNIFICANT CHANGE UP
SALICYLATES SERPL-MCNC: <0.3 MG/DL — LOW (ref 4–30)
SARS-COV-2 RNA SPEC QL NAA+PROBE: SIGNIFICANT CHANGE UP
SODIUM SERPL-SCNC: 137 MMOL/L — SIGNIFICANT CHANGE UP (ref 135–146)
SODIUM SERPL-SCNC: 138 MMOL/L — SIGNIFICANT CHANGE UP (ref 135–146)
TRIGL SERPL-MCNC: 108 MG/DL — SIGNIFICANT CHANGE UP
WBC # BLD: 15.05 K/UL — HIGH (ref 4.8–10.8)
WBC # BLD: 6.16 K/UL — SIGNIFICANT CHANGE UP (ref 4.8–10.8)
WBC # FLD AUTO: 15.05 K/UL — HIGH (ref 4.8–10.8)
WBC # FLD AUTO: 6.16 K/UL — SIGNIFICANT CHANGE UP (ref 4.8–10.8)

## 2025-01-07 PROCEDURE — 93306 TTE W/DOPPLER COMPLETE: CPT | Mod: 26

## 2025-01-07 PROCEDURE — 99285 EMERGENCY DEPT VISIT HI MDM: CPT

## 2025-01-07 PROCEDURE — 71045 X-RAY EXAM CHEST 1 VIEW: CPT | Mod: 26

## 2025-01-07 PROCEDURE — 99239 HOSP IP/OBS DSCHRG MGMT >30: CPT

## 2025-01-07 PROCEDURE — 70450 CT HEAD/BRAIN W/O DYE: CPT | Mod: 26,MC

## 2025-01-07 RX ORDER — LEVETIRACETAM 100 MG/ML
1 SOLUTION ORAL
Qty: 60 | Refills: 0
Start: 2025-01-07 | End: 2025-02-05

## 2025-01-07 RX ORDER — OXYCODONE HCL 15 MG
20 TABLET ORAL EVERY 6 HOURS
Refills: 0 | Status: DISCONTINUED | OUTPATIENT
Start: 2025-01-07 | End: 2025-01-07

## 2025-01-07 RX ORDER — ASPIRIN 81 MG
1 TABLET, DELAYED RELEASE (ENTERIC COATED) ORAL
Qty: 30 | Refills: 0
Start: 2025-01-07 | End: 2025-02-05

## 2025-01-07 RX ORDER — ACETAMINOPHEN 80 MG/.8ML
975 SOLUTION/ DROPS ORAL ONCE
Refills: 0 | Status: COMPLETED | OUTPATIENT
Start: 2025-01-07 | End: 2025-01-07

## 2025-01-07 RX ORDER — ATORVASTATIN CALCIUM 40 MG/1
1 TABLET, FILM COATED ORAL
Qty: 30 | Refills: 0
Start: 2025-01-07 | End: 2025-02-05

## 2025-01-07 RX ORDER — SODIUM CHLORIDE 9 MG/ML
1000 INJECTION, SOLUTION INTRAMUSCULAR; INTRAVENOUS; SUBCUTANEOUS ONCE
Refills: 0 | Status: COMPLETED | OUTPATIENT
Start: 2025-01-07 | End: 2025-01-07

## 2025-01-07 RX ADMIN — Medication 81 MILLIGRAM(S): at 12:04

## 2025-01-07 RX ADMIN — Medication 20 MILLIGRAM(S): at 03:00

## 2025-01-07 RX ADMIN — LEVETIRACETAM 500 MILLIGRAM(S): 100 SOLUTION ORAL at 05:21

## 2025-01-07 RX ADMIN — Medication 25 MILLIGRAM(S): at 05:21

## 2025-01-07 RX ADMIN — Medication 20 MILLIGRAM(S): at 08:54

## 2025-01-07 RX ADMIN — SODIUM CHLORIDE 1000 MILLILITER(S): 9 INJECTION, SOLUTION INTRAMUSCULAR; INTRAVENOUS; SUBCUTANEOUS at 23:23

## 2025-01-07 RX ADMIN — Medication 20 MILLIGRAM(S): at 09:45

## 2025-01-07 RX ADMIN — ACETAMINOPHEN 975 MILLIGRAM(S): 80 SOLUTION/ DROPS ORAL at 21:10

## 2025-01-07 RX ADMIN — Medication 400 MILLIGRAM(S): at 08:55

## 2025-01-07 RX ADMIN — Medication 400 MILLIGRAM(S): at 12:04

## 2025-01-07 RX ADMIN — Medication 20 MILLIGRAM(S): at 02:02

## 2025-01-07 RX ADMIN — ACETAMINOPHEN 975 MILLIGRAM(S): 80 SOLUTION/ DROPS ORAL at 23:23

## 2025-01-07 NOTE — DISCHARGE NOTE PROVIDER - ATTENDING DISCHARGE PHYSICAL EXAMINATION:
GENERAL: NAD, lying in bed comfortably  HEAD:  Atraumatic, Normocephalic  EYES: conjunctiva and sclera clear  ENT: Moist mucous membranes  NECK: Supple, No JVD  CHEST/LUNG: Clear to auscultation bilaterally; No rales, rhonchi, wheezing, or rubs. Unlabored respirations  HEART: Regular rate and rhythm; No murmurs, rubs, or gallops  ABDOMEN: Soft, nontender, nondistended  EXTREMITIES:  No clubbing, cyanosis, or edema  NERVOUS SYSTEM:  A&Ox3

## 2025-01-07 NOTE — DISCHARGE NOTE PROVIDER - NSDCCPCAREPLAN_GEN_ALL_CORE_FT
PRINCIPAL DISCHARGE DIAGNOSIS  Diagnosis: Hypercapnia  Assessment and Plan of Treatment: You came altered likely due to the combination of taking THC gummies together with your pain medicaiton (oxy), DO NOT take these in combination with each other and only take pain medication as prescribed.     Please seek immediate medical attention if you develop fever >100.4 F, feel dizzy, have nausea or vomiting, coughing blood, have sudden trouble breathing or chest pain, severe weakness, or your skin or lips are turning blue.        SECONDARY DISCHARGE DIAGNOSES  Diagnosis: Seizure  Assessment and Plan of Treatment: You did not have a seizure but we are starting you on seizure medication called keppra to prevent you having seizures from you cancer lesions in your brain, follow up with your oncologist and neurologist    Diagnosis: Abnormal EKG  Assessment and Plan of Treatment: You were seen by the cardiologist and started on lipitor and aspirin, follow up with the cardiologist outpatient for a possible stress test.     PRINCIPAL DISCHARGE DIAGNOSIS  Diagnosis: Hypercapnia  Assessment and Plan of Treatment: You came altered likely due to the combination of taking THC gummies together with your pain medicaiton (oxy), DO NOT take these in combination with each other and only take pain medication as prescribed.     Please seek immediate medical attention if you develop fever >100.4 F, feel dizzy, have nausea or vomiting, coughing blood, have sudden trouble breathing or chest pain, severe weakness, or your skin or lips are turning blue.        SECONDARY DISCHARGE DIAGNOSES  Diagnosis: Seizure  Assessment and Plan of Treatment: You did not have a seizure but we are starting you on seizure medication called keppra to prevent you having seizures from you cancer lesions in your brain, follow up with your oncologist and neurologist    Diagnosis: Abnormal EKG  Assessment and Plan of Treatment: You were seen by the cardiologist and started on lipitor and aspirin, follow up with the cardiologist outpatient for a possible stress test.    Diagnosis: Elevated prolactin level  Assessment and Plan of Treatment: Your prolactin level was elevated, follow up with a endocrinologist for this finding, you can follow up with Dr. Britney Hunt

## 2025-01-07 NOTE — DISCHARGE NOTE PROVIDER - NSDCMRMEDTOKEN_GEN_ALL_CORE_FT
Albuterol (Eqv-Proventil HFA) 90 mcg/inh inhalation aerosol: 2 puff(s) inhaled every 6 hours x 30 days as needed for  shortness of breath and/or wheezing  aspirin 81 mg oral tablet, chewable: 1 tab(s) orally once a day  atorvastatin 40 mg oral tablet: 1 tab(s) orally once a day (at bedtime)  gabapentin 300 mg oral tablet: 1 tab(s) orally 2 times a day  levETIRAcetam 500 mg oral tablet: 1 tab(s) orally 2 times a day  oxyCODONE 15 mg oral tablet: 1 tab(s) orally every 6 hours as needed for  pain

## 2025-01-07 NOTE — ED ADULT TRIAGE NOTE - CHIEF COMPLAINT QUOTE
BIBA as per EMS patient received narcan intranasal from her son for possible overdose of prescribed morphine for lung cancer

## 2025-01-07 NOTE — DISCHARGE NOTE PROVIDER - PROVIDER TOKENS
PROVIDER:[TOKEN:[[id number]],FOLLOWUP:[2 weeks]],PROVIDER:[TOKEN:[[id number]],FOLLOWUP:[2 weeks]],FREE:[LAST:[Your PCP],PHONE:[(   )    -],FAX:[(   )    -],FOLLOWUP:[1 week]],FREE:[LAST:[Your Oncologist],PHONE:[(   )    -],FAX:[(   )    -],FOLLOWUP:[1 week]] PROVIDER:[TOKEN:[[id number]],FOLLOWUP:[2 weeks]],PROVIDER:[TOKEN:[[id number]],FOLLOWUP:[2 weeks]],FREE:[LAST:[Your PCP],PHONE:[(   )    -],FAX:[(   )    -],FOLLOWUP:[1 week]],FREE:[LAST:[Your Oncologist],PHONE:[(   )    -],FAX:[(   )    -],FOLLOWUP:[1 week]],PROVIDER:[TOKEN:[[id number]],FOLLOWUP:[1 month]]

## 2025-01-07 NOTE — ED PROVIDER NOTE - CLINICAL SUMMARY MEDICAL DECISION MAKING FREE TEXT BOX
Patient is a 59-year-old female past medical history of lung cancer with known metastasis to the brain as well as hypertension and COPD presents for evaluation of overdose of opioids patient was recently admitted and then discharged from the hospital earlier today she was found by her son with altered mental status and unresponsiveness given IN Narcan with an adequate response brought in here for further evaluation initially the patient was alert and oriented denying any complaints denies headache visual changes chest pain shortness of breath abdominal pain back pain patient was able to move all 4 extremities patient denies any other coingestions she is not homicidal or suicidal in edition family present corroborates history on exam patient is normocephalic atraumatic pupils equal round and reactive light accommodation except pinpoint pupils oropharynx clear chest clear to auscultation bilaterally abdomen soft moving all 4 extremities no evidence of trauma    Assessment plan patient presents for altered mental status with a response to Narcan after using opioids as well as THC patient awake arousable however not back to baseline with periods of confusion and will admit for further observation patient is not in respiratory distress end-tidal was placed on patient and monitored she was not hypoxic we obtain EKG per my independent evaluation axis with STEMI axis with QT prolongation we obtain chest x-ray per my independent evaluation of consistent with pneumonia or pneumothorax we obtain labs no evidence of acetaminophen and alcohol or salicylate use   family at bedside and agree with admission after initially wanting to take patient home given not at baseline mental status at this time

## 2025-01-07 NOTE — DISCHARGE NOTE PROVIDER - HOSPITAL COURSE
HPi:  Pt is a 60 yo female PMH lung cancer with mets to the brain, HTN, COPD who presents with complaint of AMS at which time the pt found the pt down and hypoxic prompting EMS contact. Upon ED presentation, the pt's family states that she had a similar episode about a month ago where she required BiPAP and vasopressor support. Additionally, the pt required initiation of low dose vasopressors upon ED presentation. Of note, during transportation to the ED, the pt had a witnessed seizre where EMS gave her 5mg of Valium. This was after the pt received Narcan upon EMS presentation at the pt's home. Pt did receive an additional dose of Valium upon ED presentation but pt had a minimal response. Upon critical care evaluation the pt is somnolent and not responding to tactile stimuli but is noted to be protecting her airway. No other complaints or concerns noted at this time.    Course:  seen by neuro rec mri and eeg, eeg negative though mri with met lesions so neuro rec ppx keppra.  seen by cardio, medical management and outpt rita. Will not send on BB as HR slightly bradycardic when started.   AMS resolved, seems to be drug induced caused by combination of pt taking oxy and THC gummies, pt counselled extesnively not to mix pain medications and to only take as prescribed, she is agreeable and understands.

## 2025-01-07 NOTE — DISCHARGE NOTE PROVIDER - CARE PROVIDER_API CALL
Joshua Camara  Neurology  [address] [place] [address], [address]  [place] [address]  Phone: [phone]  Fax: [phone]  Follow Up Time: 2 weeks    Mehul Sanchez  Cardiovascular Disease  [address] [place] [id number]  Phone: [phone]  Fax: [phone]  Follow Up Time: 2 weeks    Your PCP,   Phone: (   )    -  Fax: (   )    -  Follow Up Time: 1 week    Your Oncologist,   Phone: (   )    -  Fax: (   )    -  Follow Up Time: 1 week   Joshua Camara  Neurology  [address] [place] [address], [address]  [place] [address]  Phone: [phone]  Fax: [phone]  Follow Up Time: 2 weeks    Mehul Sanchez  Cardiovascular Disease  [address] [place] [address]  Phone: [phone]  Fax: [phone]  Follow Up Time: 2 weeks    Your PCP,   Phone: (   )    -  Fax: (   )    -  Follow Up Time: 1 week    Your Oncologist,   Phone: (   )    -  Fax: (   )    -  Follow Up Time: 1 week    Britney Mares  Endocrinology/Metab/Diabetes  [address] [facility], [address]  [place] [address]  Phone: [phone]  Fax: [phone]  Follow Up Time: 1 month

## 2025-01-07 NOTE — ED PROVIDER NOTE - ATTENDING APP SHARED VISIT CONTRIBUTION OF CARE
I have personally performed a history and physical exam on this patient and personally directed the management of the patient. Patient is a 59-year-old female past medical history of lung cancer with known metastasis to the brain as well as hypertension and COPD presents for evaluation of overdose of opioids patient was recently admitted and then discharged from the hospital earlier today she was found by her son with altered mental status and unresponsiveness given IN Narcan with an adequate response brought in here for further evaluation initially the patient was alert and oriented denying any complaints denies headache visual changes chest pain shortness of breath abdominal pain back pain patient was able to move all 4 extremities patient denies any other coingestions she is not homicidal or suicidal in edition family present corroborates history on exam patient is normocephalic atraumatic pupils equal round and reactive light accommodation except pinpoint pupils oropharynx clear chest clear to auscultation bilaterally abdomen soft moving all 4 extremities no evidence of trauma    Assessment plan patient presents for altered mental status with a response to Narcan after using opioids as well as THC patient awake arousable however not back to baseline with periods of confusion and will admit for further observation patient is not in respiratory distress end-tidal was placed on patient and monitored she was not hypoxic we obtain EKG per my independent evaluation axis with STEMI axis with QT prolongation we obtain chest x-ray per my independent evaluation of consistent with pneumonia or pneumothorax we obtain labs no evidence of acetaminophen and alcohol or salicylate use   family at bedside and agree with admission after initially wanting to take patient home given not at baseline mental status at this time

## 2025-01-07 NOTE — ED ADULT NURSE NOTE - OBJECTIVE STATEMENT
as per pts son, pt was unresponsive in home, pts son stated pts oxygen was in the 30s   pt was not using her home oxygen  pts son gave narcan to pt

## 2025-01-07 NOTE — DISCHARGE NOTE NURSING/CASE MANAGEMENT/SOCIAL WORK - FINANCIAL ASSISTANCE
[facility] provides services at a reduced cost to those who are determined to be eligible through [facility]’s financial assistance program. Information regarding [facility]’s financial assistance program can be found by going to [email or web address] or by calling [phone].

## 2025-01-07 NOTE — ED ADULT NURSE NOTE - ALCOHOL PRE SCREEN (AUDIT - C)
Statement Selected Home Suture Removal Text: Patient was provided instructions on removing sutures and will remove their sutures at home.  If they have any questions or difficulties they will call the office.

## 2025-01-07 NOTE — DISCHARGE NOTE NURSING/CASE MANAGEMENT/SOCIAL WORK - NSDCPEFALRISK_GEN_ALL_CORE
For information on Fall & Injury Prevention, visit: [email or web address] OR  [email or web address] OR  https://www.cdc.gov/steadi/patient.html

## 2025-01-07 NOTE — ED ADULT TRIAGE NOTE - ARRIVAL FROM
Alert and oriented to person, place, time/situation. normal mood and affect. no apparent risk to self or others. Home

## 2025-01-07 NOTE — ED PROVIDER NOTE - OBJECTIVE STATEMENT
59y F pmh lung cancer with mets to the brain, HTN, COPD presents for overdose.  As per family patient took her pain medication consisting of morphine as well as THC edibles and was found to be unresponsive by son who delivered Narcan with improvement of arousability.  Now patient presents with mild headache.  No aggravating relieving factors.  Denies fever, chest pain, shortness of breath, abdominal pain, dysuria, hematuria, vomiting, diarrhea.

## 2025-01-07 NOTE — DISCHARGE NOTE PROVIDER - NPI NUMBER (FOR SYSADMIN USE ONLY) :
[[id number]],[[id number]],[UNKNOWN],[UNKNOWN] [[id number]],[[id number]],[UNKNOWN],[UNKNOWN],[[id number]]

## 2025-01-07 NOTE — DISCHARGE NOTE NURSING/CASE MANAGEMENT/SOCIAL WORK - PATIENT PORTAL LINK FT
You can access the FollowMyHealth Patient Portal offered by [facility] by registering at the following website: [email or web address]. By joining [facility]’s FollowMyHealth portal, you will also be able to view your health information using other applications (apps) compatible with our system.

## 2025-01-07 NOTE — ED ADULT NURSE NOTE - NSFALLRISKINTERV_ED_ALL_ED

## 2025-01-07 NOTE — DISCHARGE NOTE PROVIDER - CARE PROVIDERS DIRECT ADDRESSES
[email or web address],DirectAddress_Unknown,[email or web address] [email or web address],DirectAddress_Unknown,DirectAddress_Unknown,DirectAddress_Unknown

## 2025-01-07 NOTE — ED PROVIDER NOTE - PHYSICAL EXAMINATION
CONST: NAD  EYES: Pin point pupils. Sclera and conjunctiva clear.   ENT: No nasal discharge. Oropharynx normal appearing   NECK: Non-tender, no meningeal signs. normal ROM. supple   CARD: S1 S2; No jvd  RESP: Equal BS B/L, No wheezes, rhonchi or rales. No distress  GI: Soft, non-tender, non-distended. normal BS  MS: Normal ROM in all extremities. pulses 2 +. no calf tenderness or swelling  SKIN: Warm, dry, no acute rashes. Good turgor  NEURO: Lethargic but arousable. A&Ox3, No focal deficits. Strength 5/5 with no sensory deficits.

## 2025-01-08 DIAGNOSIS — R41.82 ALTERED MENTAL STATUS, UNSPECIFIED: ICD-10-CM

## 2025-01-08 LAB
ALBUMIN SERPL ELPH-MCNC: 3.3 G/DL — LOW (ref 3.5–5.2)
ALP SERPL-CCNC: 300 U/L — HIGH (ref 30–115)
ALT FLD-CCNC: 29 U/L — SIGNIFICANT CHANGE UP (ref 0–41)
ANION GAP SERPL CALC-SCNC: 7 MMOL/L — SIGNIFICANT CHANGE UP (ref 7–14)
AST SERPL-CCNC: 28 U/L — SIGNIFICANT CHANGE UP (ref 0–41)
BASOPHILS # BLD AUTO: 0.02 K/UL — SIGNIFICANT CHANGE UP (ref 0–0.2)
BASOPHILS NFR BLD AUTO: 0.3 % — SIGNIFICANT CHANGE UP (ref 0–1)
BILIRUB SERPL-MCNC: <0.2 MG/DL — SIGNIFICANT CHANGE UP (ref 0.2–1.2)
BUN SERPL-MCNC: 29 MG/DL — HIGH (ref 10–20)
CALCIUM SERPL-MCNC: 9 MG/DL — SIGNIFICANT CHANGE UP (ref 8.4–10.5)
CHLORIDE SERPL-SCNC: 100 MMOL/L — SIGNIFICANT CHANGE UP (ref 98–110)
CO2 SERPL-SCNC: 33 MMOL/L — HIGH (ref 17–32)
CREAT SERPL-MCNC: 0.8 MG/DL — SIGNIFICANT CHANGE UP (ref 0.7–1.5)
EGFR: 84 ML/MIN/1.73M2 — SIGNIFICANT CHANGE UP
EOSINOPHIL # BLD AUTO: 0 K/UL — SIGNIFICANT CHANGE UP (ref 0–0.7)
EOSINOPHIL NFR BLD AUTO: 0 % — SIGNIFICANT CHANGE UP (ref 0–8)
GLUCOSE SERPL-MCNC: 61 MG/DL — LOW (ref 70–99)
HCT VFR BLD CALC: 34.4 % — LOW (ref 37–47)
HGB BLD-MCNC: 10.4 G/DL — LOW (ref 12–16)
IMM GRANULOCYTES NFR BLD AUTO: 0.4 % — HIGH (ref 0.1–0.3)
LYMPHOCYTES # BLD AUTO: 1.47 K/UL — SIGNIFICANT CHANGE UP (ref 1.2–3.4)
LYMPHOCYTES # BLD AUTO: 18.6 % — LOW (ref 20.5–51.1)
MCHC RBC-ENTMCNC: 26 PG — LOW (ref 27–31)
MCHC RBC-ENTMCNC: 30.2 G/DL — LOW (ref 32–37)
MCV RBC AUTO: 86 FL — SIGNIFICANT CHANGE UP (ref 81–99)
MONOCYTES # BLD AUTO: 0.69 K/UL — HIGH (ref 0.1–0.6)
MONOCYTES NFR BLD AUTO: 8.7 % — SIGNIFICANT CHANGE UP (ref 1.7–9.3)
NEUTROPHILS # BLD AUTO: 5.7 K/UL — SIGNIFICANT CHANGE UP (ref 1.4–6.5)
NEUTROPHILS NFR BLD AUTO: 72 % — SIGNIFICANT CHANGE UP (ref 42.2–75.2)
NRBC # BLD: 0 /100 WBCS — SIGNIFICANT CHANGE UP (ref 0–0)
PLATELET # BLD AUTO: 264 K/UL — SIGNIFICANT CHANGE UP (ref 130–400)
PMV BLD: 9.9 FL — SIGNIFICANT CHANGE UP (ref 7.4–10.4)
POTASSIUM SERPL-MCNC: 4.7 MMOL/L — SIGNIFICANT CHANGE UP (ref 3.5–5)
POTASSIUM SERPL-SCNC: 4.7 MMOL/L — SIGNIFICANT CHANGE UP (ref 3.5–5)
PROT SERPL-MCNC: 6 G/DL — SIGNIFICANT CHANGE UP (ref 6–8)
RBC # BLD: 4 M/UL — LOW (ref 4.2–5.4)
RBC # FLD: 16 % — HIGH (ref 11.5–14.5)
SODIUM SERPL-SCNC: 140 MMOL/L — SIGNIFICANT CHANGE UP (ref 135–146)
TSH SERPL-MCNC: 1.12 UIU/ML — SIGNIFICANT CHANGE UP (ref 0.27–4.2)
WBC # BLD: 7.91 K/UL — SIGNIFICANT CHANGE UP (ref 4.8–10.8)
WBC # FLD AUTO: 7.91 K/UL — SIGNIFICANT CHANGE UP (ref 4.8–10.8)

## 2025-01-08 PROCEDURE — 84443 ASSAY THYROID STIM HORMONE: CPT

## 2025-01-08 PROCEDURE — 99232 SBSQ HOSP IP/OBS MODERATE 35: CPT

## 2025-01-08 PROCEDURE — 36415 COLL VENOUS BLD VENIPUNCTURE: CPT

## 2025-01-08 PROCEDURE — 76705 ECHO EXAM OF ABDOMEN: CPT

## 2025-01-08 PROCEDURE — 85025 COMPLETE CBC W/AUTO DIFF WBC: CPT

## 2025-01-08 PROCEDURE — 76705 ECHO EXAM OF ABDOMEN: CPT | Mod: 26

## 2025-01-08 PROCEDURE — 80053 COMPREHEN METABOLIC PANEL: CPT

## 2025-01-08 PROCEDURE — 83735 ASSAY OF MAGNESIUM: CPT

## 2025-01-08 RX ORDER — ASPIRIN 81 MG
81 TABLET, DELAYED RELEASE (ENTERIC COATED) ORAL DAILY
Refills: 0 | Status: DISCONTINUED | OUTPATIENT
Start: 2025-01-08 | End: 2025-01-10

## 2025-01-08 RX ORDER — NALOXONE HCL 0.4 MG/ML
0.4 VIAL (ML) INJECTION
Refills: 0 | Status: COMPLETED | OUTPATIENT
Start: 2025-01-08 | End: 2025-01-08

## 2025-01-08 RX ORDER — HEPARIN SODIUM 1000 [USP'U]/ML
5000 INJECTION, SOLUTION INTRAVENOUS; SUBCUTANEOUS EVERY 12 HOURS
Refills: 0 | Status: DISCONTINUED | OUTPATIENT
Start: 2025-01-08 | End: 2025-01-10

## 2025-01-08 RX ORDER — GINKGO BILOBA 40 MG
3 CAPSULE ORAL AT BEDTIME
Refills: 0 | Status: DISCONTINUED | OUTPATIENT
Start: 2025-01-08 | End: 2025-01-10

## 2025-01-08 RX ORDER — ALBUTEROL SULFATE 90 UG/1
2 INHALANT RESPIRATORY (INHALATION) EVERY 6 HOURS
Refills: 0 | Status: DISCONTINUED | OUTPATIENT
Start: 2025-01-08 | End: 2025-01-10

## 2025-01-08 RX ORDER — ATORVASTATIN CALCIUM 40 MG/1
40 TABLET, FILM COATED ORAL AT BEDTIME
Refills: 0 | Status: DISCONTINUED | OUTPATIENT
Start: 2025-01-08 | End: 2025-01-10

## 2025-01-08 RX ORDER — OXYCODONE HCL 15 MG
10 TABLET ORAL EVERY 6 HOURS
Refills: 0 | Status: DISCONTINUED | OUTPATIENT
Start: 2025-01-08 | End: 2025-01-09

## 2025-01-08 RX ORDER — MORPHINE SULFATE 15 MG
15 TABLET, EXTENDED RELEASE ORAL
Refills: 0 | Status: DISCONTINUED | OUTPATIENT
Start: 2025-01-08 | End: 2025-01-10

## 2025-01-08 RX ORDER — LEVETIRACETAM 100 MG/ML
500 SOLUTION ORAL
Refills: 0 | Status: DISCONTINUED | OUTPATIENT
Start: 2025-01-08 | End: 2025-01-10

## 2025-01-08 RX ORDER — SODIUM CHLORIDE 9 MG/ML
1000 INJECTION, SOLUTION INTRAVENOUS
Refills: 0 | Status: DISCONTINUED | OUTPATIENT
Start: 2025-01-08 | End: 2025-01-10

## 2025-01-08 RX ADMIN — SODIUM CHLORIDE 50 MILLILITER(S): 9 INJECTION, SOLUTION INTRAVENOUS at 01:56

## 2025-01-08 RX ADMIN — Medication 0.4 MILLIGRAM(S): at 00:52

## 2025-01-08 RX ADMIN — Medication 81 MILLIGRAM(S): at 13:58

## 2025-01-08 RX ADMIN — LEVETIRACETAM 500 MILLIGRAM(S): 100 SOLUTION ORAL at 17:14

## 2025-01-08 RX ADMIN — HEPARIN SODIUM 5000 UNIT(S): 1000 INJECTION, SOLUTION INTRAVENOUS; SUBCUTANEOUS at 17:14

## 2025-01-08 RX ADMIN — Medication 15 MILLIGRAM(S): at 17:13

## 2025-01-08 RX ADMIN — Medication 10 MILLIGRAM(S): at 16:28

## 2025-01-08 RX ADMIN — Medication 0.4 MILLIGRAM(S): at 06:33

## 2025-01-08 RX ADMIN — Medication 10 MILLIGRAM(S): at 23:57

## 2025-01-08 RX ADMIN — LEVETIRACETAM 500 MILLIGRAM(S): 100 SOLUTION ORAL at 05:08

## 2025-01-08 RX ADMIN — HEPARIN SODIUM 5000 UNIT(S): 1000 INJECTION, SOLUTION INTRAVENOUS; SUBCUTANEOUS at 05:08

## 2025-01-08 NOTE — PATIENT PROFILE ADULT - FALL HARM RISK - HARM RISK INTERVENTIONS

## 2025-01-08 NOTE — PATIENT PROFILE ADULT - FUNCTIONAL ASSESSMENT - BASIC MOBILITY 6.
2-calculated by average/Not able to assess (calculate score using [facility] averaging method)

## 2025-01-08 NOTE — H&P ADULT - NSHPLABSRESULTS_GEN_ALL_CORE
LABS:                          12.8   15.05 )-----------( 291      ( 07 Jan 2025 20:35 )             40.9     01-07    137  |  95[L]  |  25[H]  ----------------------------<  132[H]  4.9   |  24  |  1.1    Ca    9.3      07 Jan 2025 20:35  Mg     1.8     01-07    TPro  7.6  /  Alb  3.9  /  TBili  1.9[H]  /  DBili  x   /  AST  43[H]  /  ALT  38  /  AlkPhos  397[H]  01-07    LIVER FUNCTIONS - ( 07 Jan 2025 20:35 )  Alb: 3.9 g/dL / Pro: 7.6 g/dL / ALK PHOS: 397 U/L / ALT: 38 U/L / AST: 43 U/L / GGT: x             Urinalysis Basic - ( 07 Jan 2025 20:35 )    Color: x / Appearance: x / SG: x / pH: x  Gluc: 132 mg/dL / Ketone: x  / Bili: x / Urobili: x   Blood: x / Protein: x / Nitrite: x   Leuk Esterase: x / RBC: x / WBC x   Sq Epi: x / Non Sq Epi: x / Bacteria: x    < from: CT Head No Cont (01.07.25 @ 23:37) >    IMPRESSION:  1.  No evidence of acute intracranial pathology.  2.  Stable exam from 4 days prior.    --- End of Report ---    < end of copied text >

## 2025-01-08 NOTE — H&P ADULT - NSHPPHYSICALEXAM_GEN_ALL_CORE
ICU Vital Signs Last 24 Hrs  T(C): 36.8 (07 Jan 2025 18:51), Max: 36.8 (07 Jan 2025 18:51)  T(F): 98.2 (07 Jan 2025 18:51), Max: 98.2 (07 Jan 2025 18:51)  HR: 96 (08 Jan 2025 00:54) (70 - 96)  BP: 131/62 (08 Jan 2025 00:54) (100/66 - 132/78)  BP(mean): --  ABP: --  ABP(mean): --  RR: 12 (07 Jan 2025 22:51) (12 - 18)  SpO2: 99% (07 Jan 2025 22:51) (92% - 99%)    O2 Parameters below as of 07 Jan 2025 22:51  Patient On (Oxygen Delivery Method): nasal cannula  O2 Flow (L/min): 4    PHYSICAL EXAM:      Constitutional: NAD, A + O x 2. Confused at times. able to follow commands    Eyes: PEERL, EOM intact b/l.     Neck: Supple, non-tender, trachea midline.     Respiratory: CTA b/l. No rhonchi/ rales/ wheeze.     Cardiovascular: S1/ S2 present and clear, no murmur, gallops, or rub.     Gastrointestinal: Abd soft + non-tender. BS NA x 4 quadrants.     Extremities: No edema, clubbing, or cyanosis in Ext x 4.     Vascular: DP + radial Pulse 2+ b/l.     Skin: No rash/ focal lesions.     Neuro: Speech clear. CN II-XII intact b/l. Sensation intact to soft touch + Strength 5/5 in ext x 4.

## 2025-01-08 NOTE — H&P ADULT - HISTORY OF PRESENT ILLNESS
59y F pmh lung cancer with mets to the brain, HTN, COPD presents for overdose. Report per ED provider. States that patient took her pain medication this evening including PO morphine + THC took her pain medication consisting of morphine as well as THC edibles and was found to be unresponsive by son. Son administered Narcan with some improvement.   Denies chest pain, SOB, fever, chills, cough, N/V/D, dizziness, weakness, and any other acute complaints at this time.

## 2025-01-08 NOTE — PATIENT PROFILE ADULT - TRANSPORTATION
Airway  Date/Time: 1/10/2024 1:56 PM  Urgency: elective    Airway not difficult    General Information and Staff    Patient location during procedure: OR  Anesthesiologist: Doron Amezquita DO  Performed: anesthesiologist   Performed by: Doron Amezquita DO  Authorized by: Doron Amezquita DO      Indications and Patient Condition  Indications for airway management: anesthesia  Spontaneous ventilation: present  Sedation level: deep  Preoxygenated: yes  Patient position: sniffing  Mask difficulty assessment: 1 - vent by mask    Final Airway Details  Final airway type: supraglottic airway      Successful airway: classic  Size 2       Number of attempts at approach: 1        
no

## 2025-01-08 NOTE — H&P ADULT - ASSESSMENT
59y F pmh lung cancer with mets to the brain, HTN, COPD presents for opioid overdose.  Symptoms significantly improved post Narcan administration. VSS at this time.     Altered mental status-2/2 opioid overdose   Narcan PRN   CT Head (-) per Radiology report   VBG   UA   TSH level   NPO     DVT PPX   Dispo: Admit to medicine floor   Case discussed with Intensivist on duty    Fitz BRIONES   59y F pmh lung cancer with mets to the brain, HTN, COPD presents for opioid overdose.  Symptoms significantly improved post Narcan administration. VSS at this time.     Altered mental status-2/2 opioid overdose   Narcan PRN   CT Head (-) per Radiology report   VBG   UA   TSH level   NPO     JAX  S/p 1L bolus   Cont IVF with LR @50cc/hr   Kidney/ bladder US   Urine lytes     DVT PPX   Dispo: Admit to medicine floor   Case discussed with Intensivist on duty    Fitz BRIONES   59y F pmh lung cancer with mets to the brain, HTN, COPD presents for opioid overdose.  Symptoms significantly improved post Narcan administration. VSS at this time.     Altered mental status-2/2 opioid overdose   Narcan PRN   CT Head (-) per Radiology report   VBG   UA   TSH level   NPO     JAX  S/p 1L bolus   Cont IVF with LR @50cc/hr   Kidney/ bladder US   Urine lytes     DVT PPX   Dispo: Admit to medicine floor   Case discussed with Hospitalist on duty    Fitz BRIONES

## 2025-01-08 NOTE — H&P ADULT - NSHPSOCIALHISTORY_GEN_ALL_CORE
Patient with known history of prescription opioid use. PO THC use as above.   Unable to obtain further information from the patient d/t mental status.

## 2025-01-09 DIAGNOSIS — C34.90 MALIGNANT NEOPLASM OF UNSPECIFIED PART OF UNSPECIFIED BRONCHUS OR LUNG: ICD-10-CM

## 2025-01-09 DIAGNOSIS — Z51.5 ENCOUNTER FOR PALLIATIVE CARE: ICD-10-CM

## 2025-01-09 DIAGNOSIS — Z71.89 OTHER SPECIFIED COUNSELING: ICD-10-CM

## 2025-01-09 DIAGNOSIS — R52 PAIN, UNSPECIFIED: ICD-10-CM

## 2025-01-09 DIAGNOSIS — R41.82 ALTERED MENTAL STATUS, UNSPECIFIED: ICD-10-CM

## 2025-01-09 LAB
ALBUMIN SERPL ELPH-MCNC: 3.2 G/DL — LOW (ref 3.5–5.2)
ALP SERPL-CCNC: 303 U/L — HIGH (ref 30–115)
ALT FLD-CCNC: 23 U/L — SIGNIFICANT CHANGE UP (ref 0–41)
ANION GAP SERPL CALC-SCNC: 7 MMOL/L — SIGNIFICANT CHANGE UP (ref 7–14)
AST SERPL-CCNC: 21 U/L — SIGNIFICANT CHANGE UP (ref 0–41)
BASOPHILS # BLD AUTO: 0.02 K/UL — SIGNIFICANT CHANGE UP (ref 0–0.2)
BASOPHILS NFR BLD AUTO: 0.3 % — SIGNIFICANT CHANGE UP (ref 0–1)
BILIRUB SERPL-MCNC: 0.3 MG/DL — SIGNIFICANT CHANGE UP (ref 0.2–1.2)
BUN SERPL-MCNC: 17 MG/DL — SIGNIFICANT CHANGE UP (ref 10–20)
CALCIUM SERPL-MCNC: 9 MG/DL — SIGNIFICANT CHANGE UP (ref 8.4–10.5)
CHLORIDE SERPL-SCNC: 97 MMOL/L — LOW (ref 98–110)
CO2 SERPL-SCNC: 33 MMOL/L — HIGH (ref 17–32)
CREAT SERPL-MCNC: 0.5 MG/DL — LOW (ref 0.7–1.5)
EGFR: 107 ML/MIN/1.73M2 — SIGNIFICANT CHANGE UP
EOSINOPHIL # BLD AUTO: 0.05 K/UL — SIGNIFICANT CHANGE UP (ref 0–0.7)
EOSINOPHIL NFR BLD AUTO: 0.7 % — SIGNIFICANT CHANGE UP (ref 0–8)
GLUCOSE SERPL-MCNC: 90 MG/DL — SIGNIFICANT CHANGE UP (ref 70–99)
HCT VFR BLD CALC: 30.8 % — LOW (ref 37–47)
HGB BLD-MCNC: 9.9 G/DL — LOW (ref 12–16)
IMM GRANULOCYTES NFR BLD AUTO: 0.4 % — HIGH (ref 0.1–0.3)
LYMPHOCYTES # BLD AUTO: 1.24 K/UL — SIGNIFICANT CHANGE UP (ref 1.2–3.4)
LYMPHOCYTES # BLD AUTO: 18.4 % — LOW (ref 20.5–51.1)
MAGNESIUM SERPL-MCNC: 1.6 MG/DL — LOW (ref 1.8–2.4)
MCHC RBC-ENTMCNC: 27.2 PG — SIGNIFICANT CHANGE UP (ref 27–31)
MCHC RBC-ENTMCNC: 32.1 G/DL — SIGNIFICANT CHANGE UP (ref 32–37)
MCV RBC AUTO: 84.6 FL — SIGNIFICANT CHANGE UP (ref 81–99)
MONOCYTES # BLD AUTO: 0.56 K/UL — SIGNIFICANT CHANGE UP (ref 0.1–0.6)
MONOCYTES NFR BLD AUTO: 8.3 % — SIGNIFICANT CHANGE UP (ref 1.7–9.3)
NEUTROPHILS # BLD AUTO: 4.83 K/UL — SIGNIFICANT CHANGE UP (ref 1.4–6.5)
NEUTROPHILS NFR BLD AUTO: 71.9 % — SIGNIFICANT CHANGE UP (ref 42.2–75.2)
NRBC # BLD: 0 /100 WBCS — SIGNIFICANT CHANGE UP (ref 0–0)
PLATELET # BLD AUTO: 234 K/UL — SIGNIFICANT CHANGE UP (ref 130–400)
PMV BLD: 9.6 FL — SIGNIFICANT CHANGE UP (ref 7.4–10.4)
POTASSIUM SERPL-MCNC: 4.1 MMOL/L — SIGNIFICANT CHANGE UP (ref 3.5–5)
POTASSIUM SERPL-SCNC: 4.1 MMOL/L — SIGNIFICANT CHANGE UP (ref 3.5–5)
PROT SERPL-MCNC: 5.6 G/DL — LOW (ref 6–8)
RBC # BLD: 3.64 M/UL — LOW (ref 4.2–5.4)
RBC # FLD: 15.9 % — HIGH (ref 11.5–14.5)
SODIUM SERPL-SCNC: 137 MMOL/L — SIGNIFICANT CHANGE UP (ref 135–146)
WBC # BLD: 6.73 K/UL — SIGNIFICANT CHANGE UP (ref 4.8–10.8)
WBC # FLD AUTO: 6.73 K/UL — SIGNIFICANT CHANGE UP (ref 4.8–10.8)

## 2025-01-09 PROCEDURE — 99222 1ST HOSP IP/OBS MODERATE 55: CPT | Mod: 2W

## 2025-01-09 PROCEDURE — 99232 SBSQ HOSP IP/OBS MODERATE 35: CPT

## 2025-01-09 PROCEDURE — 99497 ADVNCD CARE PLAN 30 MIN: CPT | Mod: 2W,25

## 2025-01-09 RX ORDER — ONDANSETRON 4 MG/1
4 TABLET ORAL ONCE
Refills: 0 | Status: COMPLETED | OUTPATIENT
Start: 2025-01-09 | End: 2025-01-09

## 2025-01-09 RX ORDER — OXYCODONE HCL 15 MG
10 TABLET ORAL ONCE
Refills: 0 | Status: DISCONTINUED | OUTPATIENT
Start: 2025-01-09 | End: 2025-01-09

## 2025-01-09 RX ORDER — OXYCODONE HCL 15 MG
20 TABLET ORAL EVERY 6 HOURS
Refills: 0 | Status: DISCONTINUED | OUTPATIENT
Start: 2025-01-09 | End: 2025-01-10

## 2025-01-09 RX ORDER — ONDANSETRON 4 MG/1
4 TABLET ORAL EVERY 4 HOURS
Refills: 0 | Status: DISCONTINUED | OUTPATIENT
Start: 2025-01-09 | End: 2025-01-10

## 2025-01-09 RX ADMIN — LEVETIRACETAM 500 MILLIGRAM(S): 100 SOLUTION ORAL at 05:10

## 2025-01-09 RX ADMIN — Medication 20 MILLIGRAM(S): at 19:51

## 2025-01-09 RX ADMIN — Medication 81 MILLIGRAM(S): at 18:12

## 2025-01-09 RX ADMIN — Medication 20 MILLIGRAM(S): at 20:33

## 2025-01-09 RX ADMIN — LEVETIRACETAM 500 MILLIGRAM(S): 100 SOLUTION ORAL at 17:16

## 2025-01-09 RX ADMIN — Medication 10 MILLIGRAM(S): at 06:16

## 2025-01-09 RX ADMIN — Medication 20 MILLIGRAM(S): at 13:37

## 2025-01-09 RX ADMIN — Medication 10 MILLIGRAM(S): at 12:30

## 2025-01-09 RX ADMIN — ATORVASTATIN CALCIUM 40 MILLIGRAM(S): 40 TABLET, FILM COATED ORAL at 19:52

## 2025-01-09 RX ADMIN — ONDANSETRON 4 MILLIGRAM(S): 4 TABLET ORAL at 14:12

## 2025-01-09 RX ADMIN — Medication 10 MILLIGRAM(S): at 11:46

## 2025-01-09 RX ADMIN — HEPARIN SODIUM 5000 UNIT(S): 1000 INJECTION, SOLUTION INTRAVENOUS; SUBCUTANEOUS at 17:18

## 2025-01-09 RX ADMIN — Medication 15 MILLIGRAM(S): at 05:10

## 2025-01-09 RX ADMIN — Medication 15 MILLIGRAM(S): at 17:16

## 2025-01-09 NOTE — DISCHARGE NOTE PROVIDER - NSDCCPCAREPLAN_GEN_ALL_CORE_FT
PRINCIPAL DISCHARGE DIAGNOSIS  Diagnosis: Altered mental status  Assessment and Plan of Treatment: secondary to opiod overdose continue home medications as directed. Follow up with your primary care doctor in 1 week.

## 2025-01-09 NOTE — CONSULT NOTE ADULT - ASSESSMENT
59y F pmh lung cancer with mets to the brain, HTN, COPD presents for possible overdose s/p narcan. Palliative care consulted for GOC and pain management.    Spoke with patient at bedside and daughter over the phone. Patient noted that she had taken too many of her MS Contin tablets and Oxycodone tablets before coming in because she was in pain. She has been on her current regimen for over a year, and she noted he outside pain management doctor has been reluctant to adjust the medications for unknown reasons. We discussed her pain on the decreased pain regimen, and she still endorses severe pain.  We discussed the risk of overdose again, and she and the patient noted that she would benefit from someone helping to manage her pain meds at home.  All questions answered.    Discussed GOC and code status with the patient and her daughter. She wishes to be full code and named her daughter Judit HCP. All questions answered.    Recommendations  -Full code  -HCP: daughter Judit  -continue MS Contin 15mg BID  -increase oxycodone to 20mg q6h PRN (home dose)  -counseled patient on appropriate opioid use  -encouraged patient to make quick follow up with outside pain management doctor - he has reportedly been reluctant to change the patient's opioid regimen, and changing the regimen in the hospital would not beneficial if it is not continued by her outpatient physician  -patient would benefit from assistance with medication management in the house  -will attempt to reach out to Dr. Mendoza  -please discharge with narcan prescription  -will follow    MEDD (morphine equivalent daily dose):    Education about palliative care provided to patient/family.  See Recs below.    Please call [phone] with questions or concerns 24/7.

## 2025-01-09 NOTE — CONSULT NOTE ADULT - CONVERSATION DETAILS
Discussed GOC and code status with the patient and her daughter. She wishes to be full code and named her daughter Judit PHELPS. All questions answered.

## 2025-01-09 NOTE — CONSULT NOTE ADULT - SUBJECTIVE AND OBJECTIVE BOX
CC: unresponsiveness    HPI:   59y F pmh lung cancer with mets to the brain, HTN, COPD presents for overdose. Report per ED provider. States that patient took her pain medication this evening including PO morphine + THC took her pain medication consisting of morphine as well as THC edibles and was found to be unresponsive by son. Son administered Narcan with some improvement.   Denies chest pain, SOB, fever, chills, cough, N/V/D, dizziness, weakness, and any other acute complaints at this time.    (08 Jan 2025 01:04)    PERTINENT PM/SXH:   Lower back pain    Mild anemia    Stage 4 lung cancer      No significant past surgical history    History of appendectomy    History of tonsillectomy    H/O laparoscopy      FAMILY HISTORY:    None pertinent    ITEMS NOT CHECKED ARE NOT PRESENT    SOCIAL HISTORY:   Significant other/partner[ ]  Children[ ]  Quaker/Spirituality:  Substance hx:  [ ]   Tobacco hx:  [ ]   Alcohol hx: [ ]   Living Situation: [x ]Home  [ ]Long term care  [ ]Rehab [ ]Other  Home Services: [ ] HHA [ ] Visting RN [ ] Hospice  Occupation:  Home Opioid hx:  [ ] Y [ ] N [x ] I-Stop Reference No:  done yesterday, in palliative care cahrt note     ADVANCE DIRECTIVES:     [x ] Full Code [ ] DNR  MOLST  [ ]  Living Will  [ ]   DECISION MAKER(s):  [x ] Health Care Proxy(s)  [ ] Surrogate(s)  [ ] Guardian           Name(s): Phone Number(s):  Tim Spain      BASELINE (I)ADL(s) (prior to admission):    [place]: [ ]Total  [ ] Moderate [ ]Dependent  Palliative Performance Status Version 2:         %    http://npcrc.org/files/news/palliative_performance_scale_ppsv2.pdf    Allergies    No Known Allergies    Intolerances    MEDICATIONS  (STANDING):  aspirin  chewable 81 milliGRAM(s) Oral daily  atorvastatin 40 milliGRAM(s) Oral at bedtime  heparin   Injectable 5000 Unit(s) SubCutaneous every 12 hours  lactated ringers. 1000 milliLiter(s) (50 mL/Hr) IV Continuous <Continuous>  levETIRAcetam 500 milliGRAM(s) Oral two times a day  morphine ER Tablet 15 milliGRAM(s) Oral two times a day    MEDICATIONS  (PRN):  albuterol    90 MICROgram(s) HFA Inhaler 2 Puff(s) Inhalation every 6 hours PRN for shortness of breath and/or wheezing  melatonin 3 milliGRAM(s) Oral at bedtime PRN Insomnia  oxyCODONE    IR 10 milliGRAM(s) Oral every 6 hours PRN Severe Pain (7 - 10)    PRESENT SYMPTOMS: [ ]Unable to obtain due to poor mentation   Source if other than patient:  [ ]Family   [ ]Team     Pain: [x ]yes [ ]no  ALL PAIN ASSESSMENT COMPONENTS WERE ASKED ABOUT UNLESS OTHERWISE NOTED  QOL impact - significant  Location -  lower back and left side chest                  Aggravating factors - none given  Quality - none given  Radiation - to spine  Timing- constant  Severity (0-10 scale):  8/10  Minimal acceptable level (0-10 scale):     CPOT:    https://www.[facility].org/getattachment/[email or web address]/Critical-Care-Pain-Observation-Tool-(CPOT)    PAIN AD Score:   http://geriatrictoolkit.[facility]/cog/painad.pdf (press ctrl +  left click to view)    Dyspnea:                           [x ]None[ ]Mild [ ]Moderate [ ]Severe     Respiratory Distress Observation Scale (RDOS):   A score of 0 to 2 signifies little or no respiratory distress, 3 signifies mild distress, scores 4 to 6 indicate moderate distress, and scores greater than 7 signify severe distress  https://www.[facility].ca/sites/default/files/PDFS/581236-zkqcznnuqih-xqneysst-lgcftqstmju-dnkws.pdf    Anxiety:                             [x ]None[ ]Mild [ ]Moderate [ ]Severe   Fatigue:                             [ ]None[ ]Mild [ ]Moderate [ ]Severe   Nausea:                             [ ]None[ ]Mild [ ]Moderate [ ]Severe   Loss of appetite:              [ ]None[ ]Mild [ ]Moderate [ ]Severe   Constipation:                    [ ]None[ ]Mild [ ]Moderate [ ]Severe    Other Symptoms:  [x ]All other review of systems negative     Palliative Performance Status Version 2:         40%    http://npcrc.org/files/news/palliative_performance_scale_ppsv2.pdf    PHYSICAL EXAM:  Vital Signs Last 24 Hrs  T(C): 36.6 (09 Jan 2025 04:36), Max: 36.8 (08 Jan 2025 13:12)  T(F): 97.9 (09 Jan 2025 04:36), Max: 98.2 (08 Jan 2025 13:12)  HR: 69 (09 Jan 2025 04:36) (69 - 86)  BP: 131/72 (09 Jan 2025 04:36) (115/65 - 131/72)  BP(mean): --  RR: 18 (09 Jan 2025 04:36) (17 - 18)  SpO2: 99% (09 Jan 2025 04:36) (96% - 99%)    Parameters below as of 09 Jan 2025 04:36  Patient On (Oxygen Delivery Method): nasal cannula  O2 Flow (L/min): 2   I&O's Summary      GENERAL:  [ ] No acute distress [ ]Lethargic  [ ]Unarousable  [x ]Verbal  [ ]Non-Verbal [ ]Cachexia    BEHAVIORAL/PSYCH:  [x ]Alert and Oriented x4 [ ] Anxiety [ ] Delirium [ ] Agitation [ ] Calm   EYES: [ ] No scleral icterus [ ] Scleral icterus [ ] Closed  ENMT:  [ ]Dry mouth  [x ]No external oral lesions [ ] No external ear or nose lesions  CARDIOVASCULAR:  [ ]Regular [ ]Irregular [ ]Tachy [ ]Not Tachy  [ ]Miguel Ángel [ ] Edema [ ] No edema  PULMONARY:  [ ]Tachypnea  [ ]Audible excessive secretions [x ] No labored breathing [ ] labored breathing  GASTROINTESTINAL: [ ]Soft  [ ]Distended  [ ]Not distended [ ]Non tender [ ]Tender  MUSCULOSKELETAL: [ ]No clubbing [ ] clubbing  [ ] No cyanosis [ ] cyanosis  NEUROLOGIC: [ ]No focal deficits  [x ]Follows commands  [ ]Does not follow commands  [ ]Cognitive impairment  [ ]Dysphagia  [ ]Dysarthria  [ ]Paresis   SKIN: [ ] Jaundiced [ ] Non-jaundiced [ ]Rash [ ]No Rash [ ] Warm [ ] Dry  MISC/LINES: [ ] ET tube [ ] Trach [ ]NGT/OGT [ ]PEG [ ]Park    LABS: reviewed by me                        9.9    6.73  )-----------( 234      ( 09 Jan 2025 06:41 )             30.8   01-09    137  |  97[L]  |  17  ----------------------------<  90  4.1   |  33[H]  |  0.5[L]    Ca    9.0      09 Jan 2025 06:41  Mg     1.6     01-09    TPro  5.6[L]  /  Alb  3.2[L]  /  TBili  0.3  /  DBili  x   /  AST  21  /  ALT  23  /  AlkPhos  303[H]  01-09      Urinalysis Basic - ( 09 Jan 2025 06:41 )    Color: x / Appearance: x / SG: x / pH: x  Gluc: 90 mg/dL / Ketone: x  / Bili: x / Urobili: x   Blood: x / Protein: x / Nitrite: x   Leuk Esterase: x / RBC: x / WBC x   Sq Epi: x / Non Sq Epi: x / Bacteria: x      RADIOLOGY & ADDITIONAL STUDIES: reviewed by me    < from: US Abdomen Upper Quadrant Right (01.08.25 @ 11:28) >  IMPRESSION:    No acute sonographic abnormality.    Multiple echogenic hepatic lesions measuring up to 1.9 cm may represent   hemangiomas. Recommend outpatient MRI liver with and without contrast for   further evaluation.    < end of copied text >      EKG: reviewed by me    < from: 12 Lead ECG (01.06.25 @ 12:48) >  Ventricular Rate 70 BPM    Atrial Rate 70 BPM    P-R Interval 136 ms    QRS Duration 92 ms    Q-T Interval 452 ms    QTC Calculation(Bazett) 488 ms    P Axis 84 degrees    R Axis 90 degrees    T Axis -21 degrees    Diagnosis Line Normal sinus rhythm  Rightward axis  T wave abnormality, consider anterior ischemia  Prolonged QT  Abnormal ECG    < end of copied text >      PROTEIN CALORIE MALNUTRITION PRESENT: [ ]mild [ ]moderate [ ]severe [ ]underweight [ ]morbid obesity  https://www.andeal.org/vault/2440/web/files/ONC/Table_Clinical%20Characteristics%20to%20Document%20Malnutrition-White%20JV%20et%20al%202012.pdf    Height (cm): 162.6 (01-07-25 @ 19:13), 162.6 (01-06-25 @ 18:30)  Weight (kg): 40.8 (01-07-25 @ 19:13), 45.8 (01-06-25 @ 18:30)  BMI (kg/m2): 15.4 (01-07-25 @ 19:13), 17.3 (01-06-25 @ 18:30)  [ ]PPSV2 < or = to 30% [ ]significant weight loss  [ ]poor nutritional intake  [ ]anasarca      [ ]Artificial Nutrition      Palliative Care Spiritual/Emotional Screening Tool Question  Severity (0-4):                    OR                    [ x] Unable to determine. Will assess at later time if appropriate.  Score of 2 or greater indicates recommendation of Chaplaincy and/or SW referral  Chaplaincy Referral: [ ] Yes [ ] Refused [ ] Following     Caregiver Johnson:  [ ] Yes [ ] No    OR    [x ] Unable to determine. Will assess at later time if appropriate.  Social Work Referral [ ]  Patient and Family Centered Care Referral [ ]    Anticipatory Grief Present: [ ] Yes [ ] No    OR     [ x] Unable to determine. Will assess at later time if appropriate.  Social Work Referral [ ]  Patient and Family Centered Care Referral [ ]    Patient discussed with primary medical team MD  Palliative care education provided to patient and/or family

## 2025-01-09 NOTE — PROGRESS NOTE ADULT - SUBJECTIVE AND OBJECTIVE BOX
Patient seen and evaluated this am, awake and alert in bed, back to baseline, states she took an Oxycodone pill and 2 marijuana gummies and was found unresponsive. now awake and back to baseline with no complaints     T(F): 96.8 (01-06-25 @ 07:17), Max: 98.7 (01-05-25 @ 12:15)  HR: 68 (01-06-25 @ 08:00)  BP: 137/72 (01-06-25 @ 08:00)  RR: 20 (01-06-25 @ 08:00)  SpO2: 100% (01-06-25 @ 08:00) (90% - 100%)    PHYSICAL EXAM:  GENERAL: NAD  HEAD:  Atraumatic, Normocephalic  EYES: EOMI, PERRLA, conjunctiva and sclera clear  NERVOUS SYSTEM:  Alert & Oriented X3, no focal deficits   CHEST/LUNG: Clear to percussion bilaterally; No rales, rhonchi, wheezing, or rubs  HEART: Regular rate and rhythm; No murmurs, rubs, or gallops  ABDOMEN: Soft, Nontender, Nondistended; Bowel sounds present  EXTREMITIES:  2+ Peripheral Pulses, No clubbing, cyanosis, or edema        CARDIAC ENZYMES  Troponin T, High Sensitivity (01.06.25 @ 04:14)   Troponin T, High Sensitivity Result: 18    < from: 12 Lead ECG (01.03.25 @ 22:23) >  Diagnosis Line    Normal sinus rhythm  Rightward axis  Borderline ECG    Confirmed by Aguilar Guzman ([id number]) on 1/4/2025 7:53:10 AM    < end of copied text >    · EEG Report    Findings:  Background:  .continues awake / drowsy and sleep    Voltage:  normal    Organization:  less than optimal    Posterior Dominant Rhythm:  8-9 hz, symmetrical and superimposed by lower range theta    Variability:  Yes  	  Reactivity:  Yes    Sleep:  symmetrical pattern    Focal abnormalities:  none  Interictal Activity:   Bifrontal polymorphic delta left more than right    Location:    Focal Slowing:  bifrontal left >> right    Generalized Slowing:  borderline to mild    Events:  1)	No electrographic seizures or significant clinical events.  Provocations:  1)	Hyperventilation: was not performed    2)	Photic stimulation: was not performed    Impression: abnormal due to the focal and generalized slowing ad abnormal interictal as above      Clinical Correlation: C/W  focal and diffuse cerebral electrophysiological dysfunction. Suggestive of structural / white matter disorder in frontal region left > [facility]          RADIOLOGY  < from: MR Head w/wo IV Cont (01.04.25 @ 15:39) >    IMPRESSION:  Two left-sided ring-enhancing lesions as described above, both   demonstrating similar characteristics and stable in size and appearance   on CT modality from September 2023.    < end of copied text >    MEDICATIONS  (STANDING):  albuterol/ipratropium for Nebulization 3 milliLiter(s) Nebulizer every 6 hours  chlorhexidine 2% Cloths 1 Application(s) Topical <User Schedule>  heparin   Injectable 5000 Unit(s) SubCutaneous every 12 hours  morphine ER Tablet 15 milliGRAM(s) Oral every 12 hours    MEDICATIONS  (PRN):  acetaminophen     Tablet .. 975 milliGRAM(s) Oral every 8 hours PRN Mild Pain (1 - 3)  oxyCODONE    IR 20 milliGRAM(s) Oral every 6 hours PRN Moderate Pain (4 - 6)

## 2025-01-09 NOTE — DISCHARGE NOTE PROVIDER - HOSPITAL COURSE
59 year old female pmh of lung cancer with mets to the brain presented to the er after taking THC and due to an overdose from morphine as described by the family. Pt was found unresponsive and given IN Narcan by the son which improved pts arousability. Pt was then seen by palliative care who placed patient on lower oxycodone dose for 1/8 and reasses on 1/9. Pt was placed on 10 mg oxycodone and Today Pt will resume her normal 20mg oxycodon dose and if it is tolerated she will be sent home. 59 year old female pmh of lung cancer with mets to the brain presented to the er after taking THC and due to an overdose from morphine as described by the family. Pt was found unresponsive and given IN Narcan by the son which improved pts arousability. Pt was then seen by palliative care who placed patient on lower oxycodone dose for 1/8 and reassessed on 1/9. Pt was placed on 10 mg oxycodone and Today Pt will resume her normal 20mg oxycodon dose and if it is tolerated she will be sent home.

## 2025-01-10 ENCOUNTER — TRANSCRIPTION ENCOUNTER (OUTPATIENT)
Age: 60
End: 2025-01-10

## 2025-01-10 VITALS
OXYGEN SATURATION: 98 % | TEMPERATURE: 98 F | DIASTOLIC BLOOD PRESSURE: 70 MMHG | SYSTOLIC BLOOD PRESSURE: 111 MMHG | RESPIRATION RATE: 18 BRPM | HEART RATE: 68 BPM

## 2025-01-10 PROCEDURE — 99232 SBSQ HOSP IP/OBS MODERATE 35: CPT

## 2025-01-10 RX ORDER — OXYCODONE HCL 15 MG
1 TABLET ORAL
Qty: 0 | Refills: 0 | DISCHARGE

## 2025-01-10 RX ORDER — MORPHINE SULFATE 15 MG
0 TABLET, EXTENDED RELEASE ORAL
Qty: 0 | Refills: 0 | DISCHARGE

## 2025-01-10 RX ADMIN — Medication 20 MILLIGRAM(S): at 02:03

## 2025-01-10 RX ADMIN — Medication 15 MILLIGRAM(S): at 05:30

## 2025-01-10 RX ADMIN — LEVETIRACETAM 500 MILLIGRAM(S): 100 SOLUTION ORAL at 05:30

## 2025-01-10 RX ADMIN — ONDANSETRON 4 MILLIGRAM(S): 4 TABLET ORAL at 00:07

## 2025-01-10 RX ADMIN — Medication 20 MILLIGRAM(S): at 01:33

## 2025-01-10 RX ADMIN — Medication 20 MILLIGRAM(S): at 08:31

## 2025-01-10 RX ADMIN — Medication 81 MILLIGRAM(S): at 11:33

## 2025-01-10 RX ADMIN — Medication 15 MILLIGRAM(S): at 05:32

## 2025-01-10 NOTE — CHART NOTE - NSCHARTNOTEFT_GEN_A_CORE
Pt preparing for discharge at time of assessment. Family declined assessment for this reason.     per EMR Wt hx:    39.4 kg - 9/2/2023  40.8 kg 1/7/25   BMI 15.    hossein fuller MS,RD,CLC via teams
Palliative care chart note    Palliative care consult received for patient. iSTOP checked, pasted below. Chart reviewed.  Spoke with Dr. Champagne.    Patient has been on the same dose of MSContin and oxycodone PRN for at least a year. It is unlikely the patient would overdose on the medication if the medication was being used correctly without an acute change in health status.    Per Dr. Champagne's conversation with the patient's daughter, the patient was on MS Contin 15mg BID and oxycodone 20mg q6h PRN at home. Though the patient can likely be place on the same dose given it's chronic nature, will recommend to place on a lower PRN dose for today and reassess tomorrow given concern for overdose.    Recommendations if no medical contraindication:  -start MS Contin 15mg BID (home dose) (hold for sedation, confusion, RR<10)  -start oxycodone IR 10mg q6h PRN (half home dose) (hold for sedation, confusion, RR<10)  -low threshold to increase oxycodone back to 20mg q6h PRN if patient tolerates medication and has continued severe pain  -would assess home opioid use and ensure using medication correctly  -will follow and see for full consult 1/8.      Reference #: [id number]    Patient Demographic Information (PDI)       PDI	First Name	Last Name	Birth Date	Gender	Street Address	[facility] Code  ANGY Ordoñez	1965	Female	[address]	[address]    Prescription Information      PDI Filter:    PDI	Current Rx	Drug Type	Rx Written	Rx Dispensed	Drug	Quantity	Days Supply	Prescriber Name	Prescriber CARLITA #	Payment Method	Dispenser  A	Y	O	12/27/2024	12/29/2024	morphine sulf er 15 mg tablet	30	30	Zenetos, Kishore	[id number]	Medicare	[id number]  A	Y	O	12/27/2024	12/29/2024	oxycodone hcl (ir) 20 mg tab	150	30	Zenetos, Kishore	[id number]	Medicare	[facility] [id number]  A	N	O	11/29/2024	11/30/2024	oxycodone hcl (ir) 20 mg tab	150	25	Zenetos, Kishore	[id number]	Medicare	[id number]  A	N	O	11/29/2024	11/30/2024	morphine sulf er 15 mg tablet	30	15	Zenetos, Kishore	[id number]	Medicare	[id number]  A	N	O	11/01/2024	11/01/2024	oxycodone hcl (ir) 20 mg tab	150	30	Kelly Kishore	[id number]	Medicare	[id number]  A	N	O	11/01/2024	11/01/2024	morphine sulf er 15 mg tablet	30	15	Kelly Kishore	[id number]	Medicare	[id number]  A	N	O	10/04/2024	10/04/2024	oxycodone hcl (ir) 20 mg tab	150	25	Kelly Kishore	[id number]	Medicare	[id number]  A	N	O	10/04/2024	10/04/2024	morphine sulf er 15 mg tablet	30	15	Zenesteban Kishore	[id number]	Medicare	[id number]  A	N	O	09/06/2024	09/06/2024	oxycodone hcl (ir) 20 mg tab	150	30	Kelly Kishore	[id number]	Medicare	[id number]  A	N	O	08/09/2024	08/09/2024	oxycodone hcl (ir) 20 mg tab	120	30	Kelly Kishore	[id number]	Medicare	[id number]  A	N	O	08/09/2024	08/09/2024	morphine sulf er 15 mg tablet	30	15	Zenesteban Kishore	[id number]	Medicare	[id number]  A	N	O	07/12/2024	07/12/2024	oxycodone hcl (ir) 20 mg tab	120	30	Kelly Kishore	[id number]	Medicare	[id number]  A	N	O	07/12/2024	07/12/2024	morphine sulf er 15 mg tablet	30	15	Zenesteban Kishore	[id number]	Medicare	[id number]  A	N	O	06/14/2024	06/14/2024	oxycodone hcl (ir) 20 mg tab	120	30	Kelly Kishore	[id number]	Medicare	[id number]  A	N	O	06/14/2024	06/14/2024	morphine sulf er 15 mg tablet	30	15	Houston Mendozaagiotis	[id number]	Medicare	[id number]  A	N	O	05/17/2024	05/17/2024	oxycodone hcl (ir) 20 mg tab	120	30	Kelly Kishore	[id number]	Medicare	[id number]  A	N	O	05/17/2024	05/17/2024	morphine sulf er 15 mg tablet	30	15	Kelly Kishore	[id number]	Medicare	[id number]  A	N	O	04/19/2024	04/19/2024	oxycodone hcl (ir) 20 mg tab	120	30	Kelly Kishore	[id number]	Medicare	[id number]  A	N	O	04/19/2024	04/19/2024	morphine sulf er 15 mg tablet	30	15	Kelly Kishore	[id number]	Medicare	[id number]  A	N	O	03/22/2024	03/22/2024	oxycodone hcl (ir) 20 mg tab	120	30	Kelly Kishore	[id number]	Medicare	[id number]  A	N	O	03/22/2024	03/22/2024	morphine sulf er 15 mg tablet	30	15	Kelly Kishore	[id number]	Medicare	[id number]  A	N	O	02/23/2024	02/23/2024	oxycodone hcl (ir) 20 mg tab	120	30	Kelly Kishore	[id number]	Medicare	[id number]  A	N	O	02/23/2024	02/23/2024	morphine sulf er 15 mg tablet	30	15	Kelly Kishore	[id number]	Medicare	[id number]  A	N	O	01/26/2024	01/27/2024	oxycodone hcl (ir) 20 mg tab	120	30	KellyKishore	[id number]	Medicare	[id number]  A	N	O	01/26/2024	01/26/2024	morphine sulf er 15 mg tablet	30	15	ZencoltCarlos mans	[id number]	Medicare	[id number]        [phone] PRN

## 2025-01-10 NOTE — DISCHARGE NOTE NURSING/CASE MANAGEMENT/SOCIAL WORK - FINANCIAL ASSISTANCE
[facility] provides services at a reduced cost to those who are determined to be eligible through [facility]’s financial assistance program. Information regarding [facility]’s financial assistance program can be found by going to [email or web address] or by calling [phone].

## 2025-01-10 NOTE — PROGRESS NOTE ADULT - SUBJECTIVE AND OBJECTIVE BOX
Patient seen and evaluated this am, awake and alert in bed, back to baseline, states she took an Oxycodone pill and 2 marijuana gummies and was found unresponsive. now awake and back to baseline with no complaints       T(C): 36.8 (10 Moiz 2025 05:58), Max: 36.9 (09 Jan 2025 14:44)  T(F): 98.2 (10 Moiz 2025 05:58), Max: 98.5 (09 Jan 2025 14:44)  HR: 69 (10 Moiz 2025 05:58) (57 - 69)  BP: 137/86 (10 Moiz 2025 05:58) (132/67 - 137/86)  RR: 18 (10 Moiz 2025 05:58) (18 - 18)  SpO2: 96% (10 Moiz 2025 05:58) (96% - 98%)        PHYSICAL EXAM:  GENERAL: NAD  HEAD:  Atraumatic, Normocephalic  EYES: EOMI, PERRLA, conjunctiva and sclera clear  NERVOUS SYSTEM:  Alert & Oriented X3, no focal deficits   CHEST/LUNG: Clear to percussion bilaterally; No rales, rhonchi, wheezing, or rubs  HEART: Regular rate and rhythm; No murmurs, rubs, or gallops  ABDOMEN: Soft, Nontender, Nondistended; Bowel sounds present  EXTREMITIES:  2+ Peripheral Pulses, No clubbing, cyanosis, or edema        CARDIAC ENZYMES  Troponin T, High Sensitivity (01.06.25 @ 04:14)   Troponin T, High Sensitivity Result: 18    < from: 12 Lead ECG (01.03.25 @ 22:23) >  Diagnosis Line    Normal sinus rhythm  Rightward axis  Borderline ECG    Confirmed by Aguilar Guzman ([id number]) on 1/4/2025 7:53:10 AM    < end of copied text >    · EEG Report    Findings:  Background:  .continues awake / drowsy and sleep    Voltage:  normal    Organization:  less than optimal    Posterior Dominant Rhythm:  8-9 hz, symmetrical and superimposed by lower range theta    Variability:  Yes  	  Reactivity:  Yes    Sleep:  symmetrical pattern    Focal abnormalities:  none  Interictal Activity:   Bifrontal polymorphic delta left more than right    Location:    Focal Slowing:  bifrontal left >> right    Generalized Slowing:  borderline to mild    Events:  1)	No electrographic seizures or significant clinical events.  Provocations:  1)	Hyperventilation: was not performed    2)	Photic stimulation: was not performed    Impression: abnormal due to the focal and generalized slowing ad abnormal interictal as above      Clinical Correlation: C/W  focal and diffuse cerebral electrophysiological dysfunction. Suggestive of structural / white matter disorder in frontal region left > righ          RADIOLOGY  < from: MR Head w/wo IV Cont (01.04.25 @ 15:39) >    IMPRESSION:  Two left-sided ring-enhancing lesions as described above, both   demonstrating similar characteristics and stable in size and appearance   on CT modality from September 2023.    < end of copied text >    MEDICATIONS  (STANDING):  albuterol/ipratropium for Nebulization 3 milliLiter(s) Nebulizer every 6 hours  chlorhexidine 2% Cloths 1 Application(s) Topical <User Schedule>  heparin   Injectable 5000 Unit(s) SubCutaneous every 12 hours  morphine ER Tablet 15 milliGRAM(s) Oral every 12 hours    MEDICATIONS  (PRN):  acetaminophen     Tablet .. 975 milliGRAM(s) Oral every 8 hours PRN Mild Pain (1 - 3)  oxyCODONE    IR 20 milliGRAM(s) Oral every 6 hours PRN Moderate Pain (4 - 6)

## 2025-01-10 NOTE — PROGRESS NOTE ADULT - ASSESSMENT
58 yo female with a medical history of  lung cancer with mets to the brain, HTN, COPD who was admitted with transient encephalopathy  and hypoxemia  prompting EMS contact. Patient was not responsive. She had taken oxycodone and 2 marijuana gummies    Altered mental status-2/2 opioid overdose   Narcan PRN   Restarted home pain mgmt regiment, tolerated well  CT Head (-) per Radiology report     JAX, resolved  S/p 1VF  D/c today    
58 yo female with a medical history of  lung cancer with mets to the brain, HTN, COPD who was admitted with transient encephalopathy  and hypoxemia  prompting EMS contact. Patient was not responsive. She had taken oxycodone and 2 marijuana gummies     59y F pmh lung cancer with mets to the brain, HTN, COPD presents for opioid overdose.  Symptoms significantly improved post Narcan administration. VSS at this time.     Altered mental status-2/2 opioid overdose   Narcan PRN   Restart home pain mgmt regiment  CT Head (-) per Radiology report       JAX, resolved  S/p 1L bolus   Cont IVF with LR @50cc/hr   D/c tomorrow

## 2025-01-10 NOTE — DISCHARGE NOTE NURSING/CASE MANAGEMENT/SOCIAL WORK - PATIENT PORTAL LINK FT
You can access the FollowMyHealth Patient Portal offered by [facility] by registering at the following website: [email or web address]. By joining [employer]’s FollowMyHealth portal, you will also be able to view your health information using other applications (apps) compatible with our system.

## 2025-01-14 DIAGNOSIS — Z79.01 LONG TERM (CURRENT) USE OF ANTICOAGULANTS: ICD-10-CM

## 2025-01-14 DIAGNOSIS — C34.90 MALIGNANT NEOPLASM OF UNSPECIFIED PART OF UNSPECIFIED BRONCHUS OR LUNG: ICD-10-CM

## 2025-01-14 DIAGNOSIS — Z79.899 OTHER LONG TERM (CURRENT) DRUG THERAPY: ICD-10-CM

## 2025-01-14 DIAGNOSIS — Z51.5 ENCOUNTER FOR PALLIATIVE CARE: ICD-10-CM

## 2025-01-14 DIAGNOSIS — N17.9 ACUTE KIDNEY FAILURE, UNSPECIFIED: ICD-10-CM

## 2025-01-14 DIAGNOSIS — C79.31 SECONDARY MALIGNANT NEOPLASM OF BRAIN: ICD-10-CM

## 2025-01-14 DIAGNOSIS — I10 ESSENTIAL (PRIMARY) HYPERTENSION: ICD-10-CM

## 2025-01-14 DIAGNOSIS — D64.9 ANEMIA, UNSPECIFIED: ICD-10-CM

## 2025-01-14 DIAGNOSIS — T40.2X1A POISONING BY OTHER OPIOIDS, ACCIDENTAL (UNINTENTIONAL), INITIAL ENCOUNTER: ICD-10-CM

## 2025-01-14 DIAGNOSIS — J44.9 CHRONIC OBSTRUCTIVE PULMONARY DISEASE, UNSPECIFIED: ICD-10-CM

## 2025-01-14 DIAGNOSIS — Z79.82 LONG TERM (CURRENT) USE OF ASPIRIN: ICD-10-CM

## 2025-01-15 DIAGNOSIS — G92.8 OTHER TOXIC ENCEPHALOPATHY: ICD-10-CM

## 2025-01-15 DIAGNOSIS — R09.02 HYPOXEMIA: ICD-10-CM

## 2025-01-15 DIAGNOSIS — T40.711A POISONING BY CANNABIS, ACCIDENTAL (UNINTENTIONAL), INITIAL ENCOUNTER: ICD-10-CM

## 2025-01-15 DIAGNOSIS — I95.9 HYPOTENSION, UNSPECIFIED: ICD-10-CM

## 2025-01-15 DIAGNOSIS — R56.9 UNSPECIFIED CONVULSIONS: ICD-10-CM
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
## 2025-01-15 DIAGNOSIS — C79.31 SECONDARY MALIGNANT NEOPLASM OF BRAIN: ICD-10-CM

## 2025-01-15 DIAGNOSIS — J44.9 CHRONIC OBSTRUCTIVE PULMONARY DISEASE, UNSPECIFIED: ICD-10-CM

## 2025-01-15 DIAGNOSIS — T40.2X1A POISONING BY OTHER OPIOIDS, ACCIDENTAL (UNINTENTIONAL), INITIAL ENCOUNTER: ICD-10-CM

## 2025-01-15 DIAGNOSIS — C34.90 MALIGNANT NEOPLASM OF UNSPECIFIED PART OF UNSPECIFIED BRONCHUS OR LUNG: ICD-10-CM

## 2025-01-15 DIAGNOSIS — I10 ESSENTIAL (PRIMARY) HYPERTENSION: ICD-10-CM

## 2025-01-16 PROBLEM — C34.90 LUNG CANCER: Status: ACTIVE | Noted: 2025-01-16

## 2025-01-16 PROBLEM — Z85.89 HISTORY OF CANCER METASTATIC TO BRAIN: Status: ACTIVE | Noted: 2025-01-16

## 2025-01-16 RX ORDER — OXYCODONE 20 MG/1
20 TABLET ORAL
Refills: 0 | Status: ACTIVE | COMMUNITY

## 2025-01-16 RX ORDER — GABAPENTIN 300 MG/1
300 CAPSULE ORAL
Refills: 0 | Status: ACTIVE | COMMUNITY

## 2025-01-20 ENCOUNTER — APPOINTMENT (OUTPATIENT)
Dept: GERIATRICS | Facility: HOME HEALTH | Age: 60
End: 2025-01-20
Payer: MEDICARE

## 2025-01-20 VITALS
TEMPERATURE: 98.2 F | RESPIRATION RATE: 16 BRPM | DIASTOLIC BLOOD PRESSURE: 52 MMHG | OXYGEN SATURATION: 98 % | SYSTOLIC BLOOD PRESSURE: 122 MMHG | HEART RATE: 88 BPM

## 2025-01-20 DIAGNOSIS — C34.90 MALIGNANT NEOPLASM OF UNSPECIFIED PART OF UNSPECIFIED BRONCHUS OR LUNG: ICD-10-CM
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
## 2025-01-20 DIAGNOSIS — J43.9 EMPHYSEMA, UNSPECIFIED: ICD-10-CM

## 2025-01-20 DIAGNOSIS — Z85.89 PERSONAL HISTORY OF MALIGNANT NEOPLASM OF OTHER ORGANS AND SYSTEMS: ICD-10-CM

## 2025-01-20 PROCEDURE — 99495 TRANSJ CARE MGMT MOD F2F 14D: CPT

## 2025-01-20 RX ORDER — ASPIRIN 81 MG/1
81 TABLET, CHEWABLE ORAL
Refills: 0 | Status: DISCONTINUED | COMMUNITY
End: 2025-01-20

## 2025-01-20 RX ORDER — LEVETIRACETAM 500 MG/1
500 TABLET, FILM COATED ORAL TWICE DAILY
Refills: 0 | Status: DISCONTINUED | COMMUNITY
End: 2025-01-20

## 2025-01-20 RX ORDER — ATORVASTATIN CALCIUM 40 MG/1
40 TABLET, FILM COATED ORAL
Refills: 0 | Status: DISCONTINUED | COMMUNITY
End: 2025-01-20

## 2025-01-20 RX ORDER — MIDODRINE HYDROCHLORIDE 10 MG/1
10 TABLET ORAL 3 TIMES DAILY
Qty: 90 | Refills: 3 | Status: ACTIVE | COMMUNITY

## 2025-01-21 ENCOUNTER — TRANSCRIPTION ENCOUNTER (OUTPATIENT)
Age: 60
End: 2025-01-21

## 2025-02-18 ENCOUNTER — APPOINTMENT (OUTPATIENT)
Dept: GERIATRICS | Facility: HOME HEALTH | Age: 60
End: 2025-02-18

## 2025-02-18 PROCEDURE — 99202 OFFICE O/P NEW SF 15 MIN: CPT | Mod: 93

## 2025-02-18 RX ORDER — UMECLIDINIUM 62.5 UG/1
62.5 AEROSOL, POWDER ORAL
Qty: 3 | Refills: 3 | Status: ACTIVE | COMMUNITY
Start: 1900-01-01 | End: 1900-01-01

## 2025-04-12 ENCOUNTER — APPOINTMENT (OUTPATIENT)
Dept: GERIATRICS | Facility: HOME HEALTH | Age: 60
End: 2025-04-12
Payer: MEDICARE

## 2025-04-12 VITALS
TEMPERATURE: 98.5 F | OXYGEN SATURATION: 99 % | RESPIRATION RATE: 16 BRPM | SYSTOLIC BLOOD PRESSURE: 103 MMHG | DIASTOLIC BLOOD PRESSURE: 59 MMHG | HEART RATE: 65 BPM

## 2025-04-12 DIAGNOSIS — I95.9 HYPOTENSION, UNSPECIFIED: ICD-10-CM

## 2025-04-12 DIAGNOSIS — E78.2 MIXED HYPERLIPIDEMIA: ICD-10-CM

## 2025-04-12 DIAGNOSIS — Z85.89 PERSONAL HISTORY OF MALIGNANT NEOPLASM OF OTHER ORGANS AND SYSTEMS: ICD-10-CM

## 2025-04-12 DIAGNOSIS — J43.9 EMPHYSEMA, UNSPECIFIED: ICD-10-CM

## 2025-04-12 DIAGNOSIS — D64.9 ANEMIA, UNSPECIFIED: ICD-10-CM

## 2025-04-12 PROCEDURE — 99349 HOME/RES VST EST MOD MDM 40: CPT

## 2025-04-21 ENCOUNTER — OUTPATIENT (OUTPATIENT)
Dept: OUTPATIENT SERVICES | Facility: HOSPITAL | Age: 60
LOS: 1 days | End: 2025-04-21
Payer: MEDICARE

## 2025-04-21 DIAGNOSIS — Z98.890 OTHER SPECIFIED POSTPROCEDURAL STATES: Chronic | ICD-10-CM

## 2025-04-21 DIAGNOSIS — Z90.49 ACQUIRED ABSENCE OF OTHER SPECIFIED PARTS OF DIGESTIVE TRACT: Chronic | ICD-10-CM

## 2025-04-21 DIAGNOSIS — D64.9 ANEMIA, UNSPECIFIED: ICD-10-CM

## 2025-04-21 DIAGNOSIS — Z90.89 ACQUIRED ABSENCE OF OTHER ORGANS: Chronic | ICD-10-CM

## 2025-04-21 PROCEDURE — 80061 LIPID PANEL: CPT

## 2025-04-21 PROCEDURE — 83540 ASSAY OF IRON: CPT

## 2025-04-21 PROCEDURE — 82728 ASSAY OF FERRITIN: CPT

## 2025-04-21 PROCEDURE — 82306 VITAMIN D 25 HYDROXY: CPT

## 2025-04-21 PROCEDURE — 84443 ASSAY THYROID STIM HORMONE: CPT

## 2025-04-21 PROCEDURE — 85025 COMPLETE CBC W/AUTO DIFF WBC: CPT

## 2025-04-21 PROCEDURE — 83735 ASSAY OF MAGNESIUM: CPT

## 2025-04-21 PROCEDURE — 82607 VITAMIN B-12: CPT

## 2025-04-21 PROCEDURE — 83036 HEMOGLOBIN GLYCOSYLATED A1C: CPT

## 2025-04-21 PROCEDURE — 82746 ASSAY OF FOLIC ACID SERUM: CPT

## 2025-04-21 PROCEDURE — 80053 COMPREHEN METABOLIC PANEL: CPT

## 2025-04-21 PROCEDURE — 83550 IRON BINDING TEST: CPT

## 2025-04-22 DIAGNOSIS — D64.9 ANEMIA, UNSPECIFIED: ICD-10-CM

## 2025-04-29 ENCOUNTER — APPOINTMENT (OUTPATIENT)
Dept: CARDIOLOGY | Facility: CLINIC | Age: 60
End: 2025-04-29
Payer: MEDICARE

## 2025-04-29 VITALS
DIASTOLIC BLOOD PRESSURE: 64 MMHG | HEIGHT: 63 IN | WEIGHT: 100 LBS | OXYGEN SATURATION: 97 % | SYSTOLIC BLOOD PRESSURE: 110 MMHG | HEART RATE: 78 BPM | BODY MASS INDEX: 17.72 KG/M2 | RESPIRATION RATE: 14 BRPM

## 2025-04-29 DIAGNOSIS — D64.9 ANEMIA, UNSPECIFIED: ICD-10-CM

## 2025-04-29 DIAGNOSIS — R06.02 SHORTNESS OF BREATH: ICD-10-CM

## 2025-04-29 DIAGNOSIS — I27.20 PULMONARY HYPERTENSION, UNSPECIFIED: ICD-10-CM

## 2025-04-29 DIAGNOSIS — R73.03 PREDIABETES.: ICD-10-CM

## 2025-04-29 DIAGNOSIS — E78.2 MIXED HYPERLIPIDEMIA: ICD-10-CM

## 2025-04-29 DIAGNOSIS — I95.9 HYPOTENSION, UNSPECIFIED: ICD-10-CM

## 2025-04-29 PROCEDURE — 99214 OFFICE O/P EST MOD 30 MIN: CPT | Mod: 25

## 2025-04-29 PROCEDURE — 93000 ELECTROCARDIOGRAM COMPLETE: CPT

## 2025-05-12 ENCOUNTER — APPOINTMENT (OUTPATIENT)
Dept: PULMONOLOGY | Facility: CLINIC | Age: 60
End: 2025-05-12
Payer: MEDICARE

## 2025-05-12 ENCOUNTER — NON-APPOINTMENT (OUTPATIENT)
Age: 60
End: 2025-05-12

## 2025-05-12 VITALS
HEART RATE: 95 BPM | HEIGHT: 64 IN | SYSTOLIC BLOOD PRESSURE: 104 MMHG | DIASTOLIC BLOOD PRESSURE: 72 MMHG | OXYGEN SATURATION: 97 % | BODY MASS INDEX: 17.24 KG/M2 | WEIGHT: 101 LBS

## 2025-05-12 DIAGNOSIS — J43.9 EMPHYSEMA, UNSPECIFIED: ICD-10-CM

## 2025-05-12 DIAGNOSIS — C34.90 MALIGNANT NEOPLASM OF UNSPECIFIED PART OF UNSPECIFIED BRONCHUS OR LUNG: ICD-10-CM

## 2025-05-12 DIAGNOSIS — C34.12 MALIGNANT NEOPLASM OF UPPER LOBE, LEFT BRONCHUS OR LUNG: ICD-10-CM

## 2025-05-12 PROCEDURE — 99214 OFFICE O/P EST MOD 30 MIN: CPT

## 2025-05-12 PROCEDURE — G2211 COMPLEX E/M VISIT ADD ON: CPT

## 2025-05-29 ENCOUNTER — APPOINTMENT (OUTPATIENT)
Dept: GERIATRICS | Facility: HOME HEALTH | Age: 60
End: 2025-05-29

## 2025-05-29 DIAGNOSIS — D64.9 ANEMIA, UNSPECIFIED: ICD-10-CM

## 2025-05-29 DIAGNOSIS — E78.2 MIXED HYPERLIPIDEMIA: ICD-10-CM

## 2025-05-29 DIAGNOSIS — C34.90 MALIGNANT NEOPLASM OF UNSPECIFIED PART OF UNSPECIFIED BRONCHUS OR LUNG: ICD-10-CM

## 2025-05-29 DIAGNOSIS — J43.9 EMPHYSEMA, UNSPECIFIED: ICD-10-CM

## 2025-05-29 DIAGNOSIS — I95.9 HYPOTENSION, UNSPECIFIED: ICD-10-CM

## 2025-08-16 ENCOUNTER — NON-APPOINTMENT (OUTPATIENT)
Age: 60
End: 2025-08-16

## 2025-08-16 ENCOUNTER — APPOINTMENT (OUTPATIENT)
Dept: GERIATRICS | Facility: HOME HEALTH | Age: 60
End: 2025-08-16
Payer: MEDICARE

## 2025-08-16 VITALS
TEMPERATURE: 98.2 F | RESPIRATION RATE: 16 BRPM | OXYGEN SATURATION: 98 % | SYSTOLIC BLOOD PRESSURE: 102 MMHG | DIASTOLIC BLOOD PRESSURE: 58 MMHG | HEART RATE: 92 BPM

## 2025-08-16 DIAGNOSIS — D64.9 ANEMIA, UNSPECIFIED: ICD-10-CM

## 2025-08-16 DIAGNOSIS — I95.9 HYPOTENSION, UNSPECIFIED: ICD-10-CM

## 2025-08-16 DIAGNOSIS — E78.2 MIXED HYPERLIPIDEMIA: ICD-10-CM

## 2025-08-16 DIAGNOSIS — J44.9 CHRONIC OBSTRUCTIVE PULMONARY DISEASE, UNSPECIFIED: ICD-10-CM

## 2025-08-16 DIAGNOSIS — C34.12 MALIGNANT NEOPLASM OF UPPER LOBE, LEFT BRONCHUS OR LUNG: ICD-10-CM

## 2025-08-16 DIAGNOSIS — I27.20 PULMONARY HYPERTENSION, UNSPECIFIED: ICD-10-CM

## 2025-08-16 DIAGNOSIS — C34.90 MALIGNANT NEOPLASM OF UNSPECIFIED PART OF UNSPECIFIED BRONCHUS OR LUNG: ICD-10-CM

## 2025-08-16 DIAGNOSIS — J43.9 EMPHYSEMA, UNSPECIFIED: ICD-10-CM

## 2025-08-16 PROCEDURE — 99349 HOME/RES VST EST MOD MDM 40: CPT

## 2025-08-25 ENCOUNTER — LABORATORY RESULT (OUTPATIENT)
Age: 60
End: 2025-08-25

## 2025-08-28 ENCOUNTER — APPOINTMENT (OUTPATIENT)
Dept: CARDIOLOGY | Facility: CLINIC | Age: 60
End: 2025-08-28
Payer: MEDICARE

## 2025-08-28 VITALS
HEART RATE: 67 BPM | DIASTOLIC BLOOD PRESSURE: 71 MMHG | HEIGHT: 64 IN | WEIGHT: 100 LBS | BODY MASS INDEX: 17.07 KG/M2 | SYSTOLIC BLOOD PRESSURE: 114 MMHG

## 2025-08-28 DIAGNOSIS — I95.9 HYPOTENSION, UNSPECIFIED: ICD-10-CM

## 2025-08-28 DIAGNOSIS — R73.03 PREDIABETES.: ICD-10-CM

## 2025-08-28 DIAGNOSIS — J44.9 CHRONIC OBSTRUCTIVE PULMONARY DISEASE, UNSPECIFIED: ICD-10-CM

## 2025-08-28 DIAGNOSIS — I27.20 PULMONARY HYPERTENSION, UNSPECIFIED: ICD-10-CM

## 2025-08-28 DIAGNOSIS — D64.9 ANEMIA, UNSPECIFIED: ICD-10-CM

## 2025-08-28 DIAGNOSIS — R06.02 SHORTNESS OF BREATH: ICD-10-CM

## 2025-08-28 DIAGNOSIS — F17.200 NICOTINE DEPENDENCE, UNSPECIFIED, UNCOMPLICATED: ICD-10-CM

## 2025-08-28 DIAGNOSIS — E78.2 MIXED HYPERLIPIDEMIA: ICD-10-CM

## 2025-08-28 PROCEDURE — 99214 OFFICE O/P EST MOD 30 MIN: CPT

## 2025-08-28 PROCEDURE — G2211 COMPLEX E/M VISIT ADD ON: CPT

## 2025-08-29 ENCOUNTER — NON-APPOINTMENT (OUTPATIENT)
Age: 60
End: 2025-08-29

## 2025-09-15 ENCOUNTER — APPOINTMENT (OUTPATIENT)
Dept: PULMONOLOGY | Facility: CLINIC | Age: 60
End: 2025-09-15
Payer: MEDICARE

## 2025-09-15 VITALS
BODY MASS INDEX: 17.07 KG/M2 | DIASTOLIC BLOOD PRESSURE: 72 MMHG | SYSTOLIC BLOOD PRESSURE: 114 MMHG | OXYGEN SATURATION: 94 % | HEIGHT: 64 IN | WEIGHT: 100 LBS | HEART RATE: 64 BPM

## 2025-09-15 DIAGNOSIS — I27.20 PULMONARY HYPERTENSION, UNSPECIFIED: ICD-10-CM

## 2025-09-15 DIAGNOSIS — C34.90 MALIGNANT NEOPLASM OF UNSPECIFIED PART OF UNSPECIFIED BRONCHUS OR LUNG: ICD-10-CM

## 2025-09-15 DIAGNOSIS — F17.200 NICOTINE DEPENDENCE, UNSPECIFIED, UNCOMPLICATED: ICD-10-CM

## 2025-09-15 DIAGNOSIS — J44.9 CHRONIC OBSTRUCTIVE PULMONARY DISEASE, UNSPECIFIED: ICD-10-CM

## 2025-09-15 PROCEDURE — 99213 OFFICE O/P EST LOW 20 MIN: CPT

## 2025-09-15 PROCEDURE — G2211 COMPLEX E/M VISIT ADD ON: CPT
